# Patient Record
Sex: FEMALE | Race: WHITE | NOT HISPANIC OR LATINO | Employment: OTHER | URBAN - METROPOLITAN AREA
[De-identification: names, ages, dates, MRNs, and addresses within clinical notes are randomized per-mention and may not be internally consistent; named-entity substitution may affect disease eponyms.]

---

## 2017-07-20 ENCOUNTER — TELEPHONE (OUTPATIENT)
Dept: FAMILY MEDICINE | Facility: CLINIC | Age: 67
End: 2017-07-20

## 2017-07-20 NOTE — LETTER
[facility]  [address]  [place] [address]  [phone]        August 7, 2017    Andree Trujillo  [address]  [place] [address]              Dear Andree Trujillo    This is to remind you that you are due for a mammogram.     You may call our office at [phone] to schedule an appointment.    Please disregard this notice if you have already had your labs drawn or made an appointment.        Sincerely,        Dona Bell MD

## 2017-07-21 NOTE — TELEPHONE ENCOUNTER
Panel Management Review      Patient has the following on her problem list: None      Composite cancer screening  Chart review shows that this patient is due/due soon for the following Mammogram  Summary:    Patient is due/failing the following:   MAMMOGRAM    Action needed:   Patient needs referral/order: mammogram    Type of outreach:    Phone, left message for patient to call back.     Questions for provider review:    None                                                                                                                                    Rae Palacios MA       Chart routed to Care Team .

## 2017-07-28 NOTE — TELEPHONE ENCOUNTER
LVM for patient to return call to clinic. Patient is due for a mammogram. Orders placed. Will postpone 7 days and send letter if no response from patient.        Albina Cullen CMA

## 2017-10-26 ENCOUNTER — TELEPHONE (OUTPATIENT)
Dept: FAMILY MEDICINE | Facility: CLINIC | Age: 67
End: 2017-10-26

## 2017-10-26 NOTE — TELEPHONE ENCOUNTER
Patient wants to know if Dr Bell will fill out a form for her to renew her handicapped tag due to pain in her feet with arthritis and tendonitis. Ok to leave a message.

## 2017-11-10 ENCOUNTER — TELEPHONE (OUTPATIENT)
Dept: FAMILY MEDICINE | Facility: CLINIC | Age: 67
End: 2017-11-10

## 2017-11-10 ENCOUNTER — OFFICE VISIT (OUTPATIENT)
Dept: FAMILY MEDICINE | Facility: CLINIC | Age: 67
End: 2017-11-10
Payer: COMMERCIAL

## 2017-11-10 VITALS
SYSTOLIC BLOOD PRESSURE: 138 MMHG | BODY MASS INDEX: 28.57 KG/M2 | DIASTOLIC BLOOD PRESSURE: 76 MMHG | WEIGHT: 177.8 LBS | HEART RATE: 74 BPM | TEMPERATURE: 97.1 F | HEIGHT: 66 IN

## 2017-11-10 DIAGNOSIS — G25.81 RESTLESS LEG SYNDROME: ICD-10-CM

## 2017-11-10 DIAGNOSIS — M76.62 ACHILLES TENDONITIS, BILATERAL: ICD-10-CM

## 2017-11-10 DIAGNOSIS — H61.23 BILATERAL IMPACTED CERUMEN: ICD-10-CM

## 2017-11-10 DIAGNOSIS — I10 ESSENTIAL HYPERTENSION WITH GOAL BLOOD PRESSURE LESS THAN 140/90: ICD-10-CM

## 2017-11-10 DIAGNOSIS — M76.61 ACHILLES TENDONITIS, BILATERAL: ICD-10-CM

## 2017-11-10 DIAGNOSIS — E78.5 HYPERLIPIDEMIA LDL GOAL <100: ICD-10-CM

## 2017-11-10 DIAGNOSIS — Z71.89 ADVANCED DIRECTIVES, COUNSELING/DISCUSSION: ICD-10-CM

## 2017-11-10 DIAGNOSIS — Z78.0 POSTMENOPAUSAL ESTROGEN DEFICIENCY: ICD-10-CM

## 2017-11-10 DIAGNOSIS — E55.9 VITAMIN D DEFICIENCY: ICD-10-CM

## 2017-11-10 DIAGNOSIS — Z00.00 MEDICARE ANNUAL WELLNESS VISIT, INITIAL: Primary | ICD-10-CM

## 2017-11-10 DIAGNOSIS — Z12.31 ENCOUNTER FOR SCREENING MAMMOGRAM FOR BREAST CANCER: ICD-10-CM

## 2017-11-10 LAB
ALBUMIN SERPL-MCNC: 3.9 G/DL (ref 3.4–5)
ALP SERPL-CCNC: 107 U/L (ref 40–150)
ALT SERPL W P-5'-P-CCNC: 30 U/L (ref 0–50)
ANION GAP SERPL CALCULATED.3IONS-SCNC: 7 MMOL/L (ref 3–14)
AST SERPL W P-5'-P-CCNC: 15 U/L (ref 0–45)
BILIRUB SERPL-MCNC: 0.4 MG/DL (ref 0.2–1.3)
BUN SERPL-MCNC: 15 MG/DL (ref 7–30)
CALCIUM SERPL-MCNC: 8.9 MG/DL (ref 8.5–10.1)
CHLORIDE SERPL-SCNC: 104 MMOL/L (ref 94–109)
CHOLEST SERPL-MCNC: 162 MG/DL
CO2 SERPL-SCNC: 29 MMOL/L (ref 20–32)
CREAT SERPL-MCNC: 0.73 MG/DL (ref 0.52–1.04)
DEPRECATED CALCIDIOL+CALCIFEROL SERPL-MC: 28 UG/L (ref 20–75)
GFR SERPL CREATININE-BSD FRML MDRD: 80 ML/MIN/1.7M2
GLUCOSE SERPL-MCNC: 104 MG/DL (ref 70–99)
HDLC SERPL-MCNC: 78 MG/DL
LDLC SERPL CALC-MCNC: 70 MG/DL
NONHDLC SERPL-MCNC: 84 MG/DL
POTASSIUM SERPL-SCNC: 3.8 MMOL/L (ref 3.4–5.3)
PROT SERPL-MCNC: 7.3 G/DL (ref 6.8–8.8)
SODIUM SERPL-SCNC: 140 MMOL/L (ref 133–144)
TRIGL SERPL-MCNC: 69 MG/DL

## 2017-11-10 PROCEDURE — 69210 REMOVE IMPACTED EAR WAX UNI: CPT | Mod: 50 | Performed by: FAMILY MEDICINE

## 2017-11-10 PROCEDURE — 36415 COLL VENOUS BLD VENIPUNCTURE: CPT | Performed by: FAMILY MEDICINE

## 2017-11-10 PROCEDURE — 82306 VITAMIN D 25 HYDROXY: CPT | Performed by: FAMILY MEDICINE

## 2017-11-10 PROCEDURE — 90670 PCV13 VACCINE IM: CPT | Performed by: FAMILY MEDICINE

## 2017-11-10 PROCEDURE — 99397 PER PM REEVAL EST PAT 65+ YR: CPT | Mod: 25 | Performed by: FAMILY MEDICINE

## 2017-11-10 PROCEDURE — 80061 LIPID PANEL: CPT | Performed by: FAMILY MEDICINE

## 2017-11-10 PROCEDURE — 90471 IMMUNIZATION ADMIN: CPT | Performed by: FAMILY MEDICINE

## 2017-11-10 PROCEDURE — 99212 OFFICE O/P EST SF 10 MIN: CPT | Mod: 25 | Performed by: FAMILY MEDICINE

## 2017-11-10 PROCEDURE — 80053 COMPREHEN METABOLIC PANEL: CPT | Performed by: FAMILY MEDICINE

## 2017-11-10 RX ORDER — HYDROCHLOROTHIAZIDE 12.5 MG/1
12.5 CAPSULE ORAL EVERY MORNING
Qty: 90 CAPSULE | Refills: 3 | Status: SHIPPED | OUTPATIENT
Start: 2017-11-10 | End: 2018-10-05

## 2017-11-10 RX ORDER — PRAMIPEXOLE DIHYDROCHLORIDE 0.12 MG/1
TABLET ORAL
Qty: 90 TABLET | Refills: 3 | Status: CANCELLED | OUTPATIENT
Start: 2017-11-10

## 2017-11-10 RX ORDER — ATORVASTATIN CALCIUM 10 MG/1
TABLET, FILM COATED ORAL
Qty: 90 TABLET | Refills: 2 | Status: CANCELLED | OUTPATIENT
Start: 2017-11-10

## 2017-11-10 RX ORDER — LABETALOL 100 MG/1
TABLET, FILM COATED ORAL
Qty: 360 TABLET | Refills: 3 | Status: CANCELLED | OUTPATIENT
Start: 2017-11-10

## 2017-11-10 RX ORDER — LABETALOL 100 MG/1
200 TABLET, FILM COATED ORAL 2 TIMES DAILY
Qty: 360 TABLET | Refills: 3 | Status: SHIPPED | OUTPATIENT
Start: 2017-11-10 | End: 2018-10-05

## 2017-11-10 RX ORDER — PRAMIPEXOLE DIHYDROCHLORIDE 0.12 MG/1
0.12 TABLET ORAL AT BEDTIME
Qty: 90 TABLET | Refills: 3 | Status: SHIPPED | OUTPATIENT
Start: 2017-11-10 | End: 2018-10-05

## 2017-11-10 RX ORDER — HYDROCHLOROTHIAZIDE 12.5 MG/1
CAPSULE ORAL
Qty: 90 CAPSULE | Refills: 3 | Status: CANCELLED | OUTPATIENT
Start: 2017-11-10

## 2017-11-10 RX ORDER — ATORVASTATIN CALCIUM 10 MG/1
10 TABLET, FILM COATED ORAL DAILY
Qty: 90 TABLET | Refills: 3 | Status: SHIPPED | OUTPATIENT
Start: 2017-11-10 | End: 2018-10-05

## 2017-11-10 NOTE — MR AVS SNAPSHOT
After Visit Summary   11/10/2017    Andree Trujillo    MRN: [id number]           Patient Information     Date Of Birth          1950        Visit Information        Provider Department      11/10/2017 9:00 AM Dona Bell MD [facility]        Today's Diagnoses     Hyperlipidemia LDL goal <100    -  1    Restless leg syndrome        Essential hypertension with goal blood pressure less than 140/90        Encounter for screening mammogram for breast cancer        Postmenopausal estrogen deficiency        Bilateral impacted cerumen        Advanced directives, counseling/discussion          Care Instructions      Preventive Health Recommendations    Female Ages 65 +    Yearly exam:     See your health care provider every year in order to  o Review health changes.   o Discuss preventive care.    o Review your medicines if your doctor has prescribed any.      You no longer need a yearly Pap test unless you've had an abnormal Pap test in the past 10 years. If you have vaginal symptoms, such as bleeding or discharge, be sure to talk with your provider about a Pap test.      Every 1 to 2 years, have a mammogram.  If you are over 69, talk with your health care provider about whether or not you want to continue having screening mammograms.      Every 10 years, have a colonoscopy. Or, have a yearly FIT test (stool test). These exams will check for colon cancer.       Have a cholesterol test every 5 years, or more often if your doctor advises it.       Have a diabetes test (fasting glucose) every three years. If you are at risk for diabetes, you should have this test more often.       At age 65, have a bone density scan (DEXA) to check for osteoporosis (brittle bone disease).    Shots:    Get a flu shot each year.    Get a tetanus shot every 10 years.    Talk to your doctor about your pneumonia vaccines. There are now two you should receive - Pneumovax (PPSV 23) and Prevnar (PCV 13).    Talk to  your doctor about the shingles vaccine.    Talk to your doctor about the hepatitis B vaccine.    Nutrition:     Eat at least 5 servings of fruits and vegetables each day.      Eat whole-grain bread, whole-wheat pasta and brown rice instead of white grains and rice.      Talk to your provider about Calcium and Vitamin D.     Lifestyle    Exercise at least 150 minutes a week (30 minutes a day, 5 days a week). This will help you control your weight and prevent disease.      Limit alcohol to one drink per day.      No smoking.       Wear sunscreen to prevent skin cancer.       See your dentist twice a year for an exam and cleaning.      See your eye doctor every 1 to 2 years to screen for conditions such as glaucoma, macular degeneration and cataracts.          Follow-ups after your visit        Additional Services     MARLEY WOMACK REFERRAL       Your provider has referred you to [facility] Advance Care Planning Facilitator or Serious illness clinic support staff. The facilitator or support staff will contact you to schedule the appointment or for the follow up call    Reason for Referral: Basic Advance Care Planning - 1:1 need                  Follow-up notes from your care team     Return in about 1 year (around 11/10/2018) for Physical Exam and as needed throughout the year.      Future tests that were ordered for you today     Open Future Orders        Priority Expected Expires Ordered    *MA Screening Digital Bilateral Routine  11/10/2018 11/10/2017    DX Hip/Pelvis/Spine Routine  11/10/2018 11/10/2017            Who to contact     Normal or non-critical lab and imaging results will be communicated to you by MyChart, letter or phone within 4 business days after the clinic has received the results. If you do not hear from us within 7 days, please contact the clinic through MyChart or phone. If you have a critical or abnormal lab result, we will notify you by phone as soon as possible.  Submit  "refill requests through [employer] or call your pharmacy and they will forward the refill request to us. Please allow 3 business days for your refill to be completed.          If you need to speak with a  for additional information , please call: [phone]             Additional Information About Your Visit        [employer] Information     [employer] lets you send messages to your doctor, view your test results, renew your prescriptions, schedule appointments and more. To sign up, go to [email or web address] . Click on \"Log in\" on the left side of the screen, which will take you to the Welcome page. Then click on \"Sign up Now\" on the right side of the page.     You will be asked to enter the access code listed below, as well as some personal information. Please follow the directions to create your username and password.     Your access code is: [email or web address]  Expires: 2018  9:42 AM     Your access code will  in 90 days. If you need help or a new code, please call your [facility] clinic or [phone].        Care EveryWhere ID     This is your Care EveryWhere ID. This could be used by other organizations to access your [place] medical records  [id number]        Your Vitals Were     Pulse Temperature Height Breastfeeding? BMI (Body Mass Index)       74 97.1  F (36.2  C) (Tympanic) 5' 5.5\" (1.664 m) No 29.14 kg/m2        Blood Pressure from Last 3 Encounters:   11/10/17 138/76   16 106/70   12/11/15 124/72    Weight from Last 3 Encounters:   11/10/17 177 lb 12.8 oz (80.6 kg)   16 166 lb (75.3 kg)   12/11/15 167 lb 3.2 oz (75.8 kg)              We Performed the Following     ADMIN 1st VACCINE     Comprehensive metabolic panel (BMP + Alb, Alk Phos, ALT, AST, Total. Bili, TP)     HONORING CHOICES REFERRAL     Lipid Profile (Chol, Trig, HDL, LDL calc)     PNEUMOCOCCAL CONJ VACCINE 13 VALENT IM          Where to get your medicines      These medications were sent to [facility] [id number] [facility] " - [place], [place] - [address]  [address], [place] [address]     Phone:  [phone]     atorvastatin 10 MG tablet    hydrochlorothiazide 12.5 MG capsule    labetalol 100 MG tablet    pramipexole 0.125 MG tablet          Primary Care Provider Office Phone # Fax #    [facility] [phone]       [address]  [place] [address]        Equal Access to Services     JODY BRIGGS AH: Hadii aad ku hadasho Soomaali, waaxda luqadaha, qaybta kaalmada adeegyada, waxay idiin hayaan adeeg kharash la'aan . So [facility] [phone].    ATENCIÓN: Si habla español, tiene a jaeger disposición servicios gratuitos de asistencia lingüística. [facility] [phone].    We comply with applicable federal civil rights laws and [place] laws. We do not discriminate on the basis of race, color, national origin, age, disability, sex, sexual orientation, or gender identity.            Thank you!     Thank you for choosing [facility]  for your care. Our goal is always to provide you with excellent care. Hearing back from our patients is one way we can continue to improve our services. Please take a few minutes to complete the written survey that you may receive in the mail after your visit with us. Thank you!             Your Updated Medication List - Protect others around you: Learn how to safely use, store and throw away your medicines at www.disposemymeds.org.          This list is accurate as of: 11/10/17  9:42 AM.  Always use your most recent med list.                   Brand Name Dispense Instructions for use Diagnosis    ALEVE PO      Take by mouth 2 times daily (with meals)        aspirin 81 MG tablet      1 TABLET DAILY        atorvastatin 10 MG tablet    LIPITOR    90 tablet    Take 1 tablet (10 mg) by mouth daily    Hyperlipidemia LDL goal <100       DAILY MULTIVITAMIN PO      Take  by mouth.        hydrochlorothiazide 12.5 MG capsule    MICROZIDE    90 capsule    Take 1 capsule (12.5  mg) by mouth every morning    Essential hypertension with goal blood pressure less than 140/90       labetalol 100 MG tablet    NORMODYNE    360 tablet    Take 2 tablets (200 mg) by mouth 2 times daily    Hyperlipidemia LDL goal <100       pramipexole 0.125 MG tablet    MIRAPEX    90 tablet    Take 1 tablet (0.125 mg) by mouth At Bedtime    Restless leg syndrome

## 2017-11-10 NOTE — LETTER
November 21, 2017      Andree Trujillo  [address] [address]        Dear ,    We are writing to inform you of your test results.    Complete Metabolic Panel (panel that checks liver function, kidney function and electrolytes) was normal.  Fasting blood glucose was slightly elevated, this means that you have no diabetes but you could be at risk (prediabetes).  Recommend regular exercise at least 5 times a week for 45 minutes in addition to eating a healthy well balanced diet.     Cholesterol panel was normal.     Vitamin D level was on the low side of normal. Recommend that you take a supplemental Vitamin D at least 800-1000 units/day.    Resulted Orders   Lipid Profile (Chol, Trig, HDL, LDL calc)   Result Value Ref Range    Cholesterol 162 <200 mg/dL    Triglycerides 69 <150 mg/dL      Comment:      Fasting specimen    HDL Cholesterol 78 >49 mg/dL    LDL Cholesterol Calculated 70 <100 mg/dL      Comment:      Desirable:       <100 mg/dl    Non HDL Cholesterol 84 <130 mg/dL   Comprehensive metabolic panel (BMP + Alb, Alk Phos, ALT, AST, Total. Bili, TP)   Result Value Ref Range    Sodium 140 133 - 144 mmol/L    Potassium 3.8 3.4 - 5.3 mmol/L    Chloride 104 94 - 109 mmol/L    Carbon Dioxide 29 20 - 32 mmol/L    Anion Gap 7 3 - 14 mmol/L    Glucose 104 (H) 70 - 99 mg/dL      Comment:      Fasting specimen    Urea Nitrogen 15 7 - 30 mg/dL    Creatinine 0.73 0.52 - 1.04 mg/dL    GFR Estimate 80 >60 mL/min/1.7m2      Comment:      Non  GFR Calc    GFR Estimate If Black >90 >60 mL/min/1.7m2      Comment:       GFR Calc    Calcium 8.9 8.5 - 10.1 mg/dL    Bilirubin Total 0.4 0.2 - 1.3 mg/dL    Albumin 3.9 3.4 - 5.0 g/dL    Protein Total 7.3 6.8 - 8.8 g/dL    Alkaline Phosphatase 107 40 - 150 U/L    ALT 30 0 - 50 U/L    AST 15 0 - 45 U/L   Vitamin D Deficiency   Result Value Ref Range    Vitamin D Deficiency screening 28 20 - 75 ug/L      Comment:      Season, race,  dietary intake, and treatment affect the concentration of   25-hydroxy-Vitamin D. Values may decrease during winter months and increase   during summer months. Values 20-29 ug/L may indicate Vitamin D insufficiency   and values <20 ug/L may indicate Vitamin D deficiency.  Vitamin D determination is routinely performed by an immunoassay specific for   25 hydroxyvitamin D3.  If an individual is on vitamin D2 (ergocalciferol)   supplementation, please specify 25 OH vitamin D2 and D3 level determination by   LCMSMS test [id number].         If you have any questions or concerns, please call the clinic at the number listed above.       Sincerely,        Dona Bell MD/mp

## 2017-11-10 NOTE — PROGRESS NOTES
SUBJECTIVE:   Andree Trujillo is a 67 year old female who presents for Preventive Visit.      Are you in the first 12 months of your Medicare Part B coverage?  No    Healthy Habits:    Do you get at least three servings of calcium containing foods daily (dairy, green leafy vegetables, etc.)? no    Amount of exercise or daily activities, outside of work: none    Problems taking medications regularly No    Medication side effects: No    Have you had an eye exam in the past two years? yes    Do you see a dentist twice per year? yes    Do you have sleep apnea, excessive snoring or daytime drowsiness?yes    COGNITIVE SCREEN  1) Repeat 3 items (Banana, Sunrise, Chair)    2) Clock draw: NORMAL  3) 3 item recall: Recalls 3 objects  Results: 3 items recalled: COGNITIVE IMPAIRMENT LESS LIKELY    Mini-CogTM Copyright S Pavan. Licensed by the author for use in [facility]; reprinted with permission ([email or web address]). All rights reserved.          Additional Concerns:    - Would like to discuss renewing handicap parking, expiring this month. Due to a history of achillis tendinitis of both lower extremities, seen by Podiatry in the past.   Also reports occasional back pain, no previous work-up  - Wax build up of both ears, would like ears checked.       Patient informed that anything we discuss that is not related to preventative medicine, may be billed for; patient verbalizes understanding.        Reviewed and updated as needed this visit by clinical staff  Tobacco  Allergies  Meds  Problems         Reviewed and updated as needed this visit by Provider        Social History   Substance Use Topics     Smoking status: Former Smoker     Quit date: 1/17/1991     Smokeless tobacco: Never Used     Alcohol use Yes      Comment: occ       The patient does not drink >3 drinks per day nor >7 drinks per week.    Today's PHQ-2 Score:   PHQ-2 ( 1999 [employer]) 11/10/2017 12/11/2015   Q1: Little interest or pleasure in doing things  0 0   Q2: Feeling down, depressed or hopeless 0 0   PHQ-2 Score 0 0         Do you feel safe in your environment - Yes    Do you have a Health Care Directive?: No: Advance care planning reviewed with patient; information given to patient to review.      Current providers sharing in care for this patient include: Patient Care Team:  Estrella Pineda as PCP - General      Hearing impairment: no    Ability to successfully perform activities of daily living: Yes, no assistance needed     Fall risk:  Fallen 2 or more times in the past year?: No  Any fall with injury in the past year?: No      Home safety:  throw rugs in the hallway      The following health maintenance items are reviewed in Epic and correct as of today:  Health Maintenance   Topic Date Due     MAMMO SCREEN Q2 YR (SYSTEM ASSIGNED)  01/26/2013     ADVANCE DIRECTIVE PLANNING Q5 YRS  04/06/2017     INFLUENZA VACCINE (SYSTEM ASSIGNED)  09/01/2017     BMP Q1 YR  09/22/2017     FALL RISK ASSESSMENT  10/08/2017     DEXA SCAN SCREENING (SYSTEM ASSIGNED)  10/08/2017     PNEUMOCOCCAL (1 of 2 - PCV13) 10/08/2017     COLON CANCER SCREEN (SYSTEM ASSIGNED)  08/13/2019     TETANUS IMMUNIZATION (SYSTEM ASSIGNED)  01/17/2021     LIPID SCREEN Q5 YR FEMALE (SYSTEM ASSIGNED)  09/22/2021     HEPATITIS C SCREENING  Completed     Patient Active Problem List   Diagnosis     Osteopenia     Allergic state     CARDIOVASCULAR SCREENING; LDL GOAL LESS THAN 100     Advanced directives, counseling/discussion     Hypercholesterolemia     Hypertension goal BP (blood pressure) < 140/90     Achilles tendonitis, bilateral     Bilateral impacted cerumen     Vitamin D deficiency     Past Surgical History:   Procedure Laterality Date     GYN SURGERY  2009    hysterectomy     HYSTERECTOMY, PAP NO LONGER INDICATED       NO HISTORY OF SURGERY         Social History   Substance Use Topics     Smoking status: Former Smoker     Quit date: 1/17/1991     Smokeless tobacco: Never Used      Alcohol use Yes      Comment: occ     Family History   Problem Relation Age of Onset     Breast Cancer Mother      Arthritis Mother      Hypertension Father      Alcohol/Drug Father      Arthritis Father      CEREBROVASCULAR DISEASE Maternal Grandmother      CANCER Maternal Grandmother      CANCER Maternal Grandfather      Hypertension Paternal Grandfather      Hypertension Brother      Prostate Cancer Brother      Alcohol/Drug Brother      Allergies Brother      DIABETES Paternal Aunt      CANCER Sister      cervical ca     Coronary Artery Disease No family hx of          Current Outpatient Prescriptions   Medication Sig Dispense Refill     labetalol (NORMODYNE) 100 MG tablet Take 2 tablets (200 mg) by mouth 2 times daily 360 tablet 3     atorvastatin (LIPITOR) 10 MG tablet Take 1 tablet (10 mg) by mouth daily 90 tablet 3     pramipexole (MIRAPEX) 0.125 MG tablet Take 1 tablet (0.125 mg) by mouth At Bedtime 90 tablet 3     hydrochlorothiazide (MICROZIDE) 12.5 MG capsule Take 1 capsule (12.5 mg) by mouth every morning 90 capsule 3     [DISCONTINUED] labetalol (NORMODYNE) 100 MG tablet Take 2 tablets (200 mg) by mouth 2 times daily 360 tablet 3     [DISCONTINUED] hydrochlorothiazide (MICROZIDE) 12.5 MG capsule Take 1 capsule (12.5 mg) by mouth every morning 90 capsule 3     [DISCONTINUED] pramipexole (MIRAPEX) 0.125 MG tablet Take 1 tablet (0.125 mg) by mouth At Bedtime 90 tablet 3     [DISCONTINUED] atorvastatin (LIPITOR) 10 MG tablet Take 1 tablet (10 mg) by mouth daily 90 tablet 3     Naproxen Sodium (ALEVE PO) Take by mouth 2 times daily (with meals)       Multiple Vitamin (DAILY MULTIVITAMIN PO) Take  by mouth.       ASPIRIN 81 MG OR TABS 1 TABLET DAILY       Allergies   Allergen Reactions     Erythromycin Rash     Penicillins Rash         Pneumonia Vaccine:Adults age 65+ who received Pneumovax (PPSV23) at 65 years or older: Should be given PCV13 > 1 year after their most recent PPSV23    ROS:  Constitutional,  "HEENT, cardiovascular, pulmonary, GI, , musculoskeletal, neuro, skin, endocrine and psych systems are negative, except as otherwise noted.      OBJECTIVE:   /76  Pulse 74  Temp 97.1  F (36.2  C) (Tympanic)  Ht 5' 5.5\" (1.664 m)  Wt 177 lb 12.8 oz (80.6 kg)  Breastfeeding? No  BMI 29.14 kg/m2 Estimated body mass index is 29.14 kg/(m^2) as calculated from the following:    Height as of this encounter: 5' 5.5\" (1.664 m).    Weight as of this encounter: 177 lb 12.8 oz (80.6 kg).  EXAM:   GENERAL: healthy, alert and no distress  EYES: Eyes grossly normal to inspection, PERRL and conjunctivae and sclerae normal  HENT: Using an otoscope noted bilateral cerumen impaction in both ear canals; using a lighted ear curette, the cerumen was removed completely and easily to reveal normal ear canals and TM's normal, nose and mouth without ulcers or lesions  NECK: no adenopathy, no asymmetry, masses, or scars and thyroid normal to palpation  RESP: lungs clear to auscultation - no rales, rhonchi or wheezes  BREAST: normal without masses, tenderness or nipple discharge and no palpable axillary masses or adenopathy  CV: regular rate and rhythm, normal S1 S2, no S3 or S4, no murmur, click or rub, no peripheral edema and peripheral pulses strong  ABDOMEN: soft, nontender, no hepatosplenomegaly, no masses and bowel sounds normal  MS: no gross musculoskeletal defects noted, no edema  SKIN: no suspicious lesions or rashes  NEURO: Normal strength and tone, mentation intact and speech normal  PSYCH: mentation appears normal, affect normal/bright    DATA  Labs in progress  ASSESSMENT / PLAN:   Andree was seen today for annual visit.    Diagnoses and all orders for this visit:    Medicare annual wellness visit, initial  -     PNEUMOCOCCAL CONJ VACCINE 13 VALENT IM  -     ADMIN 1st VACCINE    Hyperlipidemia LDL goal <100, controlled on Lipitor  -    Refill:  atorvastatin (LIPITOR) 10 MG tablet; Take 1 tablet (10 mg) by mouth " "daily  -     Lipid Profile (Chol, Trig, HDL, LDL calc)  -     Comprehensive metabolic panel (BMP + Alb, Alk Phos, ALT, AST, Total. Bili, TP)    Restless leg syndrome, controlled on medications  -     Refill: pramipexole (MIRAPEX) 0.125 MG tablet; Take 1 tablet (0.125 mg) by mouth At Bedtime    Essential hypertension with goal blood pressure less than 140/90, controlled  -    Refill:  labetalol (NORMODYNE) 100 MG tablet; Take 2 tablets (200 mg) by mouth 2 times daily  -    Refill: hydrochlorothiazide (MICROZIDE) 12.5 MG capsule; Take 1 capsule (12.5 mg) by mouth every morning  -     Comprehensive metabolic panel (BMP + Alb, Alk Phos, ALT, AST, Total. Bili, TP)    Encounter for screening mammogram for breast cancer  -     *MA Screening Digital Bilateral; Future    Postmenopausal estrogen deficiency  -     DX Hip/Pelvis/Spine; Future    Bilateral impacted cerumen  -     REMOVE IMPACTED CERUMEN    History of Achilles tendonitis, bilateral  Requesting re-cert for disability parking permit x 1 year.  Patient to complete her portion and will bring in for provider section completion    Vitamin D deficiency  -     Vitamin D Deficiency    Advanced directives, counseling/discussion  -     HONORING CHOICES REFERRAL          End of Life Planning:  Patient currently has an advanced directive: No.  I have verified the patient's ablity to prepare an advanced directive/make health care decisions.  Literature was provided to assist patient in preparing an advanced directive.    COUNSELING:  Reviewed preventive health counseling, as reflected in patient instructions       Regular exercise       Healthy diet/nutrition        Estimated body mass index is 29.14 kg/(m^2) as calculated from the following:    Height as of this encounter: 5' 5.5\" (1.664 m).    Weight as of this encounter: 177 lb 12.8 oz (80.6 kg).  Weight management plan: Discussed healthy diet and exercise guidelines and patient will follow up in 12 months in clinic to " re-evaluate.   reports that she quit smoking about 26 years ago. She has never used smokeless tobacco.        Appropriate preventive services were discussed with this patient, including applicable screening as appropriate for cardiovascular disease, diabetes, osteopenia/osteoporosis, and glaucoma.  As appropriate for age/gender, discussed screening for colorectal cancer, prostate cancer, breast cancer, and cervical cancer. Checklist reviewing preventive services available has been given to the patient.    Reviewed patients plan of care and provided an AVS. The Basic Care Plan (routine screening as documented in Health Maintenance) for Andree meets the Care Plan requirement. This Care Plan has been established and reviewed with the Patient.    Counseling Resources:  ATP IV Guidelines  Pooled Cohorts Equation Calculator  Breast Cancer Risk Calculator  FRAX Risk Assessment  ICSI Preventive Guidelines  Dietary Guidelines for Americans, 2010  USDA's MyPlate  ASA Prophylaxis  Lung CA Screening    Follow up annually and as needed thoughout the year.    Dona Bell MD  [facility]

## 2017-12-15 ENCOUNTER — TELEPHONE (OUTPATIENT)
Dept: FAMILY MEDICINE | Facility: CLINIC | Age: 67
End: 2017-12-15

## 2017-12-15 NOTE — LETTER
[facility]  [address]  [place] [address]  [phone]        January 2, 2018    Andree Trujillo  [address]  [place] [address]              Dear Andree Trujillo    This is to remind you that your mammogram is due.    You may call our office at [phone] to have orders placed for you and to get an appointment scheduled.    Please disregard this notice if you have already made an appointment.        Sincerely,        [facility]

## 2017-12-20 NOTE — TELEPHONE ENCOUNTER
Left message for patient to call back pod 2 line.([phone])    If no call back in x1 week send letter.

## 2018-02-16 ENCOUNTER — OFFICE VISIT (OUTPATIENT)
Dept: FAMILY MEDICINE | Facility: CLINIC | Age: 68
End: 2018-02-16
Payer: COMMERCIAL

## 2018-02-16 VITALS
OXYGEN SATURATION: 97 % | BODY MASS INDEX: 28.28 KG/M2 | HEART RATE: 77 BPM | DIASTOLIC BLOOD PRESSURE: 85 MMHG | WEIGHT: 176 LBS | SYSTOLIC BLOOD PRESSURE: 136 MMHG | HEIGHT: 66 IN | TEMPERATURE: 97.7 F

## 2018-02-16 DIAGNOSIS — J06.9 UPPER RESPIRATORY TRACT INFECTION, UNSPECIFIED TYPE: Primary | ICD-10-CM

## 2018-02-16 PROCEDURE — 99213 OFFICE O/P EST LOW 20 MIN: CPT | Performed by: PHYSICIAN ASSISTANT

## 2018-02-16 NOTE — LETTER
[facility]  [address] [facility] [address]  Phone: [phone]    February 16, 2018        Andree Trujillo  [address] [address] [facility] [address]          To whom it may concern:    RE: Andree Trujillo    Patient was seen and treated today at our clinic.    Please contact me for questions or concerns.      Sincerely,        Sin Aguilar PA-C

## 2018-02-16 NOTE — MR AVS SNAPSHOT
"              After Visit Summary   2018    Andree Trujillo    MRN: [id number]           Patient Information     Date Of Birth          1950        Visit Information        Provider Department      2018 12:30 PM Sin Aguilar PA-C [facility]        Today's Diagnoses     Upper respiratory tract infection, unspecified type    -  1       Follow-ups after your visit        Who to contact     If you have questions or need follow up information about today's clinic visit or your schedule please contact [facility] directly at [phone].  Normal or non-critical lab and imaging results will be communicated to you by [employer], letter or phone within 4 business days after the clinic has received the results. If you do not hear from us within 7 days, please contact the clinic through [employer] or phone. If you have a critical or abnormal lab result, we will notify you by phone as soon as possible.  Submit refill requests through [employer] or call your pharmacy and they will forward the refill request to us. Please allow 3 business days for your refill to be completed.          Additional Information About Your Visit        MyChart Information     [employer] lets you send messages to your doctor, view your test results, renew your prescriptions, schedule appointments and more. To sign up, go to [email or web address] . Click on \"Log in\" on the left side of the screen, which will take you to the Welcome page. Then click on \"Sign up Now\" on the right side of the page.     You will be asked to enter the access code listed below, as well as some personal information. Please follow the directions to create your username and password.     Your access code is: [email or web address]  Expires: 2018 12:46 PM     Your access code will  in 90 days. If you need help or a new code, please call your [facility] or [phone].        Care EveryWhere ID     This is your Care EveryWhere ID. " "This could be used by other organizations to access your [place] medical records  [id number]        Your Vitals Were     Pulse Temperature Height Pulse Oximetry BMI (Body Mass Index)       77 97.7  F (36.5  C) (Tympanic) 5' 5.5\" (1.664 m) 97% 28.84 kg/m2        Blood Pressure from Last 3 Encounters:   02/16/18 136/85   11/10/17 138/76   09/22/16 106/70    Weight from Last 3 Encounters:   02/16/18 176 lb (79.8 kg)   11/10/17 177 lb 12.8 oz (80.6 kg)   09/22/16 166 lb (75.3 kg)              Today, you had the following     No orders found for display       Primary Care Provider Office Phone # Fax #    [facility] [phone] [address] [facility] [address]        Equal Access to Services     JODY BRIGGS : Hadii aad ku hadasho Soomaali, waaxda luqadaha, qaybta kaalmada adeegyada, waxay idiin hayaan chelle carmona . So [facility] [phone].    ATENCIÓN: Si habla español, tiene a jaeger disposición servicios gratuitos de asistencia lingüística. Taniya al [phone].    We comply with applicable federal civil rights laws and [place] laws. We do not discriminate on the basis of race, color, national origin, age, disability, sex, sexual orientation, or gender identity.            Thank you!     Thank you for choosing [facility]  [facility]. Our goal is always to provide you with excellent care. Hearing back from our patients is one way we can continue to improve our services. Please take a few minutes to complete the written survey that you may receive in the mail after your visit with us. Thank you!             Your Updated Medication List - Protect others around you: Learn how to safely use, store and throw away your medicines at www.disposemymeds.org.          This list is accurate as of 2/16/18 12:46 PM.  Always use your most recent med list.                   Brand Name Dispense Instructions for use Diagnosis    ALEVE PO      Take by mouth 2 times daily " (with meals)        aspirin 81 MG tablet      1 TABLET DAILY        atorvastatin 10 MG tablet    LIPITOR    90 tablet    Take 1 tablet (10 mg) by mouth daily    Hyperlipidemia LDL goal <100       DAILY MULTIVITAMIN PO      Take  by mouth.        hydrochlorothiazide 12.5 MG capsule    MICROZIDE    90 capsule    Take 1 capsule (12.5 mg) by mouth every morning    Essential hypertension with goal blood pressure less than 140/90       labetalol 100 MG tablet    NORMODYNE    360 tablet    Take 2 tablets (200 mg) by mouth 2 times daily    Essential hypertension with goal blood pressure less than 140/90       pramipexole 0.125 MG tablet    MIRAPEX    90 tablet    Take 1 tablet (0.125 mg) by mouth At Bedtime    Restless leg syndrome

## 2018-02-16 NOTE — PROGRESS NOTES
SUBJECTIVE:                                                    Andree Trujillo is a 67 year old female who presents to clinic today for the following health issues:    RESPIRATORY SYMPTOMS      Duration: 2 weeks    Description  Cough, body aches, chest congestion, fatige, wheezing.     Severity: moderate    Accompanying signs and symptoms: None    History (predisposing factors):  none    Precipitating or alleviating factors: None    Therapies tried and outcome:  Benadryl, cough drops   Overall is slowly feeling better.     Problem list and histories reviewed & adjusted, as indicated.  Additional history: as documented    Patient Active Problem List   Diagnosis     Osteopenia     Allergic state     CARDIOVASCULAR SCREENING; LDL GOAL LESS THAN 100     Advanced directives, counseling/discussion     Hypercholesterolemia     Hypertension goal BP (blood pressure) < 140/90     Achilles tendonitis, bilateral     Bilateral impacted cerumen     Vitamin D deficiency     Past Surgical History:   Procedure Laterality Date     GYN SURGERY  2009    hysterectomy     HYSTERECTOMY, PAP NO LONGER INDICATED       NO HISTORY OF SURGERY         Social History   Substance Use Topics     Smoking status: Former Smoker     Quit date: 1/17/1991     Smokeless tobacco: Never Used     Alcohol use Yes      Comment: occ     Family History   Problem Relation Age of Onset     Breast Cancer Mother      Arthritis Mother      Hypertension Father      Alcohol/Drug Father      Arthritis Father      CEREBROVASCULAR DISEASE Maternal Grandmother      CANCER Maternal Grandmother      CANCER Maternal Grandfather      Hypertension Paternal Grandfather      Hypertension Brother      Prostate Cancer Brother      Alcohol/Drug Brother      Allergies Brother      DIABETES Paternal Aunt      CANCER Sister      cervical ca     Coronary Artery Disease No family hx of          Current Outpatient Prescriptions   Medication Sig Dispense Refill     labetalol (NORMODYNE) 100  "MG tablet Take 2 tablets (200 mg) by mouth 2 times daily 360 tablet 3     atorvastatin (LIPITOR) 10 MG tablet Take 1 tablet (10 mg) by mouth daily 90 tablet 3     pramipexole (MIRAPEX) 0.125 MG tablet Take 1 tablet (0.125 mg) by mouth At Bedtime 90 tablet 3     hydrochlorothiazide (MICROZIDE) 12.5 MG capsule Take 1 capsule (12.5 mg) by mouth every morning 90 capsule 3     Naproxen Sodium (ALEVE PO) Take by mouth 2 times daily (with meals)       Multiple Vitamin (DAILY MULTIVITAMIN PO) Take  by mouth.       ASPIRIN 81 MG OR TABS 1 TABLET DAILY       Allergies   Allergen Reactions     Erythromycin Rash     Penicillins Rash       ROS:  Constitutional, HEENT, cardiovascular, pulmonary, gi and gu systems are negative, except as otherwise noted.    OBJECTIVE:     /85  Pulse 77  Temp 97.7  F (36.5  C) (Tympanic)  Ht 5' 5.5\" (1.664 m)  Wt 176 lb (79.8 kg)  SpO2 97%  BMI 28.84 kg/m2  Body mass index is 28.84 kg/(m^2).  GENERAL: healthy, alert and no distress  Head: Normocephalic, atraumatic.  Eyes: Conjunctiva clear, non icteric. PERRLA.  Ears: External ears and TMs normal BL.  Nose: Septum midline, nasal mucosa pink and moist. No discharge.  Mouth / Throat: Normal dentition.  No oral lesions. Pharynx non erythematous, tonsils without hypertrophy.  Neck: Supple, no enlarged LN, trachea midline.  Heart: S1 and S2 normal, no murmurs, clicks, gallops or rubs. Regular rate and rhythm.  Chest: Clear; no wheezes or rales.    Diagnostic Test Results:  none     ASSESSMENT/PLAN:         ICD-10-CM    1. Upper respiratory tract infection, unspecified type J06.9    Warning signs discussed.  side effects discussed  Symptomatic treatment: such as fluids,  OTC acetaminophen and /or non-steroidal anti-inflammatory medication.  Follow up  1-2 wks as needed     Sin Aguilar PA-C  [facility]    "

## 2018-02-16 NOTE — NURSING NOTE
"Chief Complaint   Patient presents with     Cough       Initial /85  Pulse 77  Temp 97.7  F (36.5  C) (Tympanic)  Ht 5' 5.5\" (1.664 m)  Wt 176 lb (79.8 kg)  SpO2 97%  BMI 28.84 kg/m2 Estimated body mass index is 28.84 kg/(m^2) as calculated from the following:    Height as of this encounter: 5' 5.5\" (1.664 m).    Weight as of this encounter: 176 lb (79.8 kg).  Medication Reconciliation: complete  Juanita Buitrago CMA    "

## 2018-06-12 ENCOUNTER — TELEPHONE (OUTPATIENT)
Dept: FAMILY MEDICINE | Facility: CLINIC | Age: 68
End: 2018-06-12

## 2018-06-12 DIAGNOSIS — Z12.31 ENCOUNTER FOR SCREENING MAMMOGRAM FOR BREAST CANCER: Primary | ICD-10-CM

## 2018-06-12 NOTE — TELEPHONE ENCOUNTER
Panel Management Review      Patient has the following on her problem list: None      Composite cancer screening  Chart review shows that this patient is due/due soon for the following Mammogram  Summary:    Patient is due/failing the following:   MAMMOGRAM    Action needed:   Patient needs office visit for mammogram.    Type of outreach:    Phone, left message for patient to call back.     Questions for provider review:    None                                                                                                                                    Rae Palacios MA       Chart routed to Care Team .

## 2018-06-19 NOTE — TELEPHONE ENCOUNTER
Left message for patient to call back pod 2 line.([phone])  If no call back in x1 week; send letter.

## 2018-06-29 ENCOUNTER — RADIANT APPOINTMENT (OUTPATIENT)
Dept: MAMMOGRAPHY | Facility: CLINIC | Age: 68
End: 2018-06-29
Attending: FAMILY MEDICINE
Payer: COMMERCIAL

## 2018-06-29 DIAGNOSIS — Z12.31 ENCOUNTER FOR SCREENING MAMMOGRAM FOR BREAST CANCER: ICD-10-CM

## 2018-06-29 PROCEDURE — 77067 SCR MAMMO BI INCL CAD: CPT | Mod: TC

## 2018-10-04 NOTE — PATIENT INSTRUCTIONS

## 2018-10-04 NOTE — PROGRESS NOTES
"   SUBJECTIVE:   CC: Andree Trujillo is an 67 year old woman who presents for preventive health visit.     Healthy Habits:    Do you get at least three servings of calcium containing foods daily (dairy, green leafy vegetables, etc.)? {YES/NO, DAIRY INTAKE:520709::\"yes\"}    Amount of exercise or daily activities, outside of work: {AMOUNT EXERCISE:589436}    Problems taking medications regularly {Yes /No default:447692::\"No\"}    Medication side effects: {Yes /No default.:903827::\"No\"}    Have you had an eye exam in the past two years? {YESNOBLANK:054556}    Do you see a dentist twice per year? {YESNOBLANK:578200}    Do you have sleep apnea, excessive snoring or daytime drowsiness?{YESNOBLANK:484300}  {Outside tests to abstract? :576939}    {additional problems to add (Optional):091502}    Today's PHQ-2 Score:   PHQ-2 ( 1999 [employer]) 11/10/2017 12/11/2015   Q1: Little interest or pleasure in doing things 0 0   Q2: Feeling down, depressed or hopeless 0 0   PHQ-2 Score 0 0     {PHQ-2 LOOK IN ASSESSMENTS (Optional) :799545}  Abuse: Current or Past(Physical, Sexual or Emotional)- {YES/NO/NA:959289}  Do you feel safe in your environment - {YES/NO/NA:703943}    Social History   Substance Use Topics     Smoking status: Former Smoker     Quit date: 1/17/1991     Smokeless tobacco: Never Used     Alcohol use Yes      Comment: occ     If you drink alcohol do you typically have >3 drinks per day or >7 drinks per week? {ETOH :212049}                     Reviewed orders with patient.  Reviewed health maintenance and updated orders accordingly - {Yes/No:802773::\"Yes\"}  {Chronicprobdata (Optional):062689}    {Mammo Decision Support (Optional):424360}    Pertinent mammograms are reviewed under the imaging tab.  History of abnormal Pap smear: {PAP HX:591414}  PAP / HPV 1/20/2011 4/13/2009   PAP NIL NIL     Reviewed and updated as needed this visit by clinical staff         Reviewed and updated as needed this visit by Provider      " "  {HISTORY OPTIONS (Optional):513000}    ROS:  { :172933}    OBJECTIVE:   There were no vitals taken for this visit.  EXAM:  {Exam Choices:935318}    {Diagnostic Test Results (Optional):421464::\"Diagnostic Test Results:\",\"none \"}    ASSESSMENT/PLAN:   {Diag Picklist:832810}    COUNSELING:   {FEMALE COUNSELING MESSAGES:956472::\"Reviewed preventive health counseling, as reflected in patient instructions\"}    BP Readings from Last 1 Encounters:   02/16/18 136/85     Estimated body mass index is 28.84 kg/(m^2) as calculated from the following:    Height as of 2/16/18: 5' 5.5\" (1.664 m).    Weight as of 2/16/18: 176 lb (79.8 kg).    {BP Counseling- Complete if BP >= 120/80  (Optional):120074}  {Weight Management Plan (ACO) Complete if BMI is abnormal-  Ages 18-64  BMI >24.9.  Age 65+ with BMI <23 or >30 (Optional):214969}     reports that she quit smoking about 27 years ago. She has never used smokeless tobacco.  {Tobacco Cessation -- Complete if patient is a smoker (Optional):807128}    Counseling Resources:  ATP IV Guidelines  Pooled Cohorts Equation Calculator  Breast Cancer Risk Calculator  FRAX Risk Assessment  ICSI Preventive Guidelines  Dietary Guidelines for Americans, 2010  [employer] MyPlate  ASA Prophylaxis  Lung CA Screening    Dona Bell MD  [facility]  "

## 2018-10-04 NOTE — PROGRESS NOTES
SUBJECTIVE:   Andree Trujillo is a 67 year old female who presents for Preventive Visit.  Are you in the first 12 months of your Medicare Part B coverage?  No    Healthy Habits:    Do you get at least three servings of calcium containing foods daily (dairy, green leafy vegetables, etc.)? no    Amount of exercise or daily activities, outside of work: none    Problems taking medications regularly No    Medication side effects: No    Have you had an eye exam in the past two years? yes    Do you see a dentist twice per year? yes  Do you have sleep apnea, excessive snoring or daytime drowsiness? Daytime drowsiness       Ability to successfully perform activities of daily living: Yes, no assistance needed    Home safety:  throw rugs in the hallway     Hearing impairment: No    Fall risk:  Fallen 2 or more times in the past year?: No  Any fall with injury in the past year?: No      COGNITIVE SCREEN  1) Repeat 3 items (Leader, Season, Table)    2) Clock draw: NORMAL  3) 3 item recall: Recalls 3 objects  Results: 3 items recalled: COGNITIVE IMPAIRMENT LESS LIKELY    Mini-CogTM Copyright S Pavan. Licensed by the author for use in [facility]; reprinted with permission (lauren@[facility]). All rights reserved.          Lower Back Pain- Chronic   A lot of discomfort with sitting for long periods of time.   Pain radiation through left and right buttocks.   Has not been seen for this previously.  Reporting hx of bursitis in lower back/ upper buttocks.  Taking OTC analgesics for it.   Denies having any     Medication Request  Would like to discuss increase number of tab dispensed at a time for Mirapex; prefers a # 90 supply.    HYPERLIPIDEMIA - Patient has a long history of significant Hyperlipidemia requiring medication for treatment with recent good control. Patient reports no problems or side effects with the medication.                                                                                                                                                        .  HYPERTENSION - Patient has longstanding history of HTN , currently denies any symptoms referable to elevated blood pressure. Specifically denies chest pain, palpitations, dyspnea, orthopnea, PND or peripheral edema. Blood pressure readings have been in normal range. Current medication regimen is as listed below. Patient denies any side effects of medication.                                                                                                                                                                                          .  History of Restless Leg syndrome, symptoms are controlled on Mirapex, requesting a refill. # 90 day supply.     Requesting her ears to be checked, has a history of cerumen impaction.    Requesting a Disability Application Renewal     HEALTH CARE MAINTENANCE: Due for a Dexa screening    Patient informed that anything we discuss that is not related to preventative medicine, may be billed for; patient verbalizes understanding.        Reviewed and updated as needed this visit by clinical staff  Tobacco  Allergies  Meds         Reviewed and updated as needed this visit by Provider        Social History   Substance Use Topics     Smoking status: Former Smoker     Quit date: 1/17/1991     Smokeless tobacco: Never Used     Alcohol use Yes      Comment: occ       If you drink alcohol do you typically have >3 drinks per day or >7 drinks per week? No                        Today's PHQ-2 Score:   PHQ-2 ( 1999 Pf[employer]) 10/4/2018 11/10/2017   Q1: Little interest or pleasure in doing things 0 0   Q2: Feeling down, depressed or hopeless 0 0   PHQ-2 Score 0 0       Do you feel safe in your environment - Yes    Do you have a Health Care Directive?: No: Advance care planning reviewed with patient; information given to patient to review.    Current providers sharing in care for this patient include:   Patient Care Team:  Estrella Pineda  as PCP - General    The following health maintenance items are reviewed in Epic and correct as of today:  Health Maintenance   Topic Date Due     HIV SCREEN (SYSTEM ASSIGNED)  10/08/1970     DEXA SCAN SCREENING (SYSTEM ASSIGNED)  10/08/2017     INFLUENZA VACCINE (1) 09/01/2018     PNEUMOCOCCAL (2 of 2 - PPSV23) 11/10/2018     COLON CANCER SCREEN (SYSTEM ASSIGNED)  08/13/2019     BMP Q1 YR  10/05/2019     FALL RISK ASSESSMENT  10/05/2019     PHQ-2 Q1 YR  10/05/2019     MAMMO SCREEN Q2 YR (SYSTEM ASSIGNED)  06/29/2020     TETANUS IMMUNIZATION (SYSTEM ASSIGNED)  01/17/2021     ADVANCE DIRECTIVE PLANNING Q5 YRS  11/10/2022     LIPID SCREEN Q5 YR FEMALE (SYSTEM ASSIGNED)  10/05/2023     HEPATITIS C SCREENING  Completed     BP Readings from Last 3 Encounters:   10/05/18 129/79   02/16/18 136/85   11/10/17 138/76    Wt Readings from Last 3 Encounters:   10/05/18 179 lb 12.8 oz (81.6 kg)   02/16/18 176 lb (79.8 kg)   11/10/17 177 lb 12.8 oz (80.6 kg)                  Patient Active Problem List   Diagnosis     Osteopenia     Allergic state     CARDIOVASCULAR SCREENING; LDL GOAL LESS THAN 100     Advanced directives, counseling/discussion     Hypercholesterolemia     Hypertension goal BP (blood pressure) < 140/90     Achilles tendonitis, bilateral     Bilateral impacted cerumen     Vitamin D deficiency     Past Surgical History:   Procedure Laterality Date     HYSTERECTOMY, PAP NO LONGER INDICATED  2009    Total hysterectomy with bilateral oophorectomy due to Pelvic pain       Social History   Substance Use Topics     Smoking status: Former Smoker     Quit date: 1/17/1991     Smokeless tobacco: Never Used     Alcohol use Yes      Comment: occ     Family History   Problem Relation Age of Onset     Breast Cancer Mother      Arthritis Mother      Hypertension Father      Alcohol/Drug Father      Arthritis Father      Cerebrovascular Disease Maternal Grandmother      Cancer Maternal Grandmother      Cancer Maternal Grandfather   "    Hypertension Paternal Grandfather      Hypertension Brother      Prostate Cancer Brother      Alcohol/Drug Brother      Allergies Brother      Diabetes Paternal Aunt      Uterine Cancer Sister      Coronary Artery Disease No family hx of          Current Outpatient Prescriptions   Medication Sig Dispense Refill     ASPIRIN 81 MG OR TABS 1 TABLET DAILY       atorvastatin (LIPITOR) 10 MG tablet Take 1 tablet (10 mg) by mouth daily 90 tablet 3     hydrochlorothiazide (MICROZIDE) 12.5 MG capsule Take 1 capsule (12.5 mg) by mouth every morning 90 capsule 3     labetalol (NORMODYNE) 100 MG tablet Take 2 tablets (200 mg) by mouth 2 times daily 360 tablet 3     Multiple Vitamin (DAILY MULTIVITAMIN PO) Take  by mouth.       Naproxen Sodium (ALEVE PO) Take by mouth 2 times daily (with meals)       pramipexole (MIRAPEX) 0.125 MG tablet Take 1 tablet (0.125 mg) by mouth At Bedtime 90 tablet 3     Allergies   Allergen Reactions     Erythromycin Rash     Penicillins Rash           ROS:  Constitutional, HEENT, cardiovascular, pulmonary, GI, , musculoskeletal, neuro, skin, endocrine and psych systems are negative, except as otherwise noted.    OBJECTIVE:   /79  Pulse 74  Temp 97.8  F (36.6  C) (Tympanic)  Ht 5' 4.5\" (1.638 m)  Wt 179 lb 12.8 oz (81.6 kg)  Breastfeeding? No  BMI 30.39 kg/m2 Estimated body mass index is 30.39 kg/(m^2) as calculated from the following:    Height as of this encounter: 5' 4.5\" (1.638 m).    Weight as of this encounter: 179 lb 12.8 oz (81.6 kg).  EXAM:   GENERAL: healthy, alert and no distress  EYES: Eyes grossly normal to inspection, PERRL and conjunctivae and sclerae normal  HENT: Using an otoscope, noted soft bilateral cerumen impaction, this was removed easily an ear curette to reveal normal ear canals and TM's normal, nose and mouth without ulcers or lesions  NECK: no adenopathy, no asymmetry, masses, or scars and thyroid normal to palpation  RESP: lungs clear to auscultation - no " rales, rhonchi or wheezes  BREAST: normal without masses, tenderness or nipple discharge and no palpable axillary masses or adenopathy  CV: regular rate and rhythm, normal S1 S2, no S3 or S4, no murmur, click or rub, no peripheral edema and peripheral pulses strong  ABDOMEN: soft, nontender, no hepatosplenomegaly, no masses and bowel sounds normal  MS: no gross musculoskeletal defects noted, no edema  SKIN: no suspicious lesions or rashes  NEURO: Normal strength and tone, mentation intact and speech normal  PSYCH: mentation appears normal, affect normal/bright    Diagnostic Test Results:  Labs drawn and in process    ASSESSMENT / PLAN:   Andree was seen today for physical.    Diagnoses and all orders for this visit:    Medicare annual wellness visit, subsequent    Postmenopausal estrogen deficiency  -     DX Hip/Pelvis/Spine; Future    Hyperlipidemia LDL goal <100; on Atorvastatin  -     Lipid Profile (Chol, Trig, HDL, LDL calc)  -     Comprehensive metabolic panel (BMP + Alb, Alk Phos, ALT, AST, Total. Bili, TP)  -     atorvastatin (LIPITOR) 10 MG tablet; Take 1 tablet (10 mg) by mouth daily  -     TSH with free T4 reflex    Essential hypertension with goal blood pressure less than 140/90, controlled  -     Comprehensive metabolic panel (BMP + Alb, Alk Phos, ALT, AST, Total. Bili, TP)  -     Refill: hydrochlorothiazide (MICROZIDE) 12.5 MG capsule; Take 1 capsule (12.5 mg) by mouth every morning  -     Refill: labetalol (NORMODYNE) 100 MG tablet; Take 2 tablets (200 mg) by mouth 2 times daily    Restless leg syndrome, controlled  -     Refill: pramipexole (MIRAPEX) 0.125 MG tablet; Take 1 tablet (0.125 mg) by mouth At Bedtime  -     CBC with platelets    Chronic bilateral low back pain without sciatica  -     XR Lumbar Spine 2/3 Views  In the interim take OTC analgesics and back strengthening exercises.     Bilateral impacted cerumen  -     REMOVE IMPACTED CERUMEN  Successfully removed with an ear curette here in the  "office, no immediate complications.     Advanced directives, counseling/discussion  Literature was provided to assist patient in preparing an advanced directive    Will notify her with results.    End of Life Planning:  Patient currently has an advanced directive: No.  I have verified the patient's ablity to prepare an advanced directive/make health care decisions.  Literature was provided to assist patient in preparing an advanced directive.    COUNSELING:  Reviewed preventive health counseling, as reflected in patient instructions       Regular exercise       Healthy diet/nutrition    BP Readings from Last 1 Encounters:   10/05/18 129/79     Estimated body mass index is 30.39 kg/(m^2) as calculated from the following:    Height as of this encounter: 5' 4.5\" (1.638 m).    Weight as of this encounter: 179 lb 12.8 oz (81.6 kg).      Weight management plan: Discussed healthy diet and exercise guidelines and patient will follow up in 12 months in clinic to re-evaluate.     reports that she quit smoking about 27 years ago. She has never used smokeless tobacco.      Appropriate preventive services were discussed with this patient, including applicable screening as appropriate for cardiovascular disease, diabetes, osteopenia/osteoporosis, and glaucoma.  As appropriate for age/gender, discussed screening for colorectal cancer, prostate cancer, breast cancer, and cervical cancer. Checklist reviewing preventive services available has been given to the patient.    Reviewed patients plan of care and provided an AVS. The Basic Care Plan (routine screening as documented in Health Maintenance) for Andree meets the Care Plan requirement. This Care Plan has been established and reviewed with the Patient.    Counseling Resources:  ATP IV Guidelines  Pooled Cohorts Equation Calculator  Breast Cancer Risk Calculator  FRAX Risk Assessment  ICSI Preventive Guidelines  Dietary Guidelines for Americans, 2010  USDA's MyPlate  ASA " Prophylaxis  Lung CA Screening    Follow up annually and as needed thoughout the year.    Dona Bell MD  [facility]

## 2018-10-05 ENCOUNTER — RADIANT APPOINTMENT (OUTPATIENT)
Dept: GENERAL RADIOLOGY | Facility: CLINIC | Age: 68
End: 2018-10-05
Attending: FAMILY MEDICINE
Payer: COMMERCIAL

## 2018-10-05 ENCOUNTER — OFFICE VISIT (OUTPATIENT)
Dept: FAMILY MEDICINE | Facility: CLINIC | Age: 68
End: 2018-10-05
Payer: COMMERCIAL

## 2018-10-05 VITALS
DIASTOLIC BLOOD PRESSURE: 79 MMHG | HEART RATE: 74 BPM | HEIGHT: 65 IN | TEMPERATURE: 97.8 F | BODY MASS INDEX: 29.96 KG/M2 | SYSTOLIC BLOOD PRESSURE: 129 MMHG | WEIGHT: 179.8 LBS

## 2018-10-05 DIAGNOSIS — G89.29 CHRONIC BILATERAL LOW BACK PAIN WITHOUT SCIATICA: ICD-10-CM

## 2018-10-05 DIAGNOSIS — Z71.89 ADVANCED DIRECTIVES, COUNSELING/DISCUSSION: ICD-10-CM

## 2018-10-05 DIAGNOSIS — I10 ESSENTIAL HYPERTENSION WITH GOAL BLOOD PRESSURE LESS THAN 140/90: ICD-10-CM

## 2018-10-05 DIAGNOSIS — H61.23 BILATERAL IMPACTED CERUMEN: ICD-10-CM

## 2018-10-05 DIAGNOSIS — Z00.00 MEDICARE ANNUAL WELLNESS VISIT, SUBSEQUENT: Primary | ICD-10-CM

## 2018-10-05 DIAGNOSIS — Z78.0 POSTMENOPAUSAL ESTROGEN DEFICIENCY: ICD-10-CM

## 2018-10-05 DIAGNOSIS — M54.50 CHRONIC BILATERAL LOW BACK PAIN WITHOUT SCIATICA: ICD-10-CM

## 2018-10-05 DIAGNOSIS — E78.5 HYPERLIPIDEMIA LDL GOAL <100: ICD-10-CM

## 2018-10-05 DIAGNOSIS — G25.81 RESTLESS LEG SYNDROME: ICD-10-CM

## 2018-10-05 LAB
ALBUMIN SERPL-MCNC: 3.8 G/DL (ref 3.4–5)
ALP SERPL-CCNC: 109 U/L (ref 40–150)
ALT SERPL W P-5'-P-CCNC: 31 U/L (ref 0–50)
ANION GAP SERPL CALCULATED.3IONS-SCNC: 10 MMOL/L (ref 3–14)
AST SERPL W P-5'-P-CCNC: 18 U/L (ref 0–45)
BILIRUB SERPL-MCNC: 0.5 MG/DL (ref 0.2–1.3)
BUN SERPL-MCNC: 11 MG/DL (ref 7–30)
CALCIUM SERPL-MCNC: 9.3 MG/DL (ref 8.5–10.1)
CHLORIDE SERPL-SCNC: 103 MMOL/L (ref 94–109)
CHOLEST SERPL-MCNC: 151 MG/DL
CO2 SERPL-SCNC: 26 MMOL/L (ref 20–32)
CREAT SERPL-MCNC: 0.71 MG/DL (ref 0.52–1.04)
ERYTHROCYTE [DISTWIDTH] IN BLOOD BY AUTOMATED COUNT: 12.9 % (ref 10–15)
GFR SERPL CREATININE-BSD FRML MDRD: 83 ML/MIN/1.7M2
GLUCOSE SERPL-MCNC: 100 MG/DL (ref 70–99)
HCT VFR BLD AUTO: 40.3 % (ref 35–47)
HDLC SERPL-MCNC: 78 MG/DL
HGB BLD-MCNC: 13.6 G/DL (ref 11.7–15.7)
LDLC SERPL CALC-MCNC: 58 MG/DL
MCH RBC QN AUTO: 30.8 PG (ref 26.5–33)
MCHC RBC AUTO-ENTMCNC: 33.7 G/DL (ref 31.5–36.5)
MCV RBC AUTO: 91 FL (ref 78–100)
NONHDLC SERPL-MCNC: 73 MG/DL
PLATELET # BLD AUTO: 355 10E9/L (ref 150–450)
POTASSIUM SERPL-SCNC: 3.8 MMOL/L (ref 3.4–5.3)
PROT SERPL-MCNC: 7.4 G/DL (ref 6.8–8.8)
RBC # BLD AUTO: 4.41 10E12/L (ref 3.8–5.2)
SODIUM SERPL-SCNC: 139 MMOL/L (ref 133–144)
TRIGL SERPL-MCNC: 74 MG/DL
TSH SERPL DL<=0.005 MIU/L-ACNC: 1.19 MU/L (ref 0.4–4)
WBC # BLD AUTO: 8 10E9/L (ref 4–11)

## 2018-10-05 PROCEDURE — 99214 OFFICE O/P EST MOD 30 MIN: CPT | Mod: 25 | Performed by: FAMILY MEDICINE

## 2018-10-05 PROCEDURE — 99397 PER PM REEVAL EST PAT 65+ YR: CPT | Mod: 25 | Performed by: FAMILY MEDICINE

## 2018-10-05 PROCEDURE — 36415 COLL VENOUS BLD VENIPUNCTURE: CPT | Performed by: FAMILY MEDICINE

## 2018-10-05 PROCEDURE — 69210 REMOVE IMPACTED EAR WAX UNI: CPT | Performed by: FAMILY MEDICINE

## 2018-10-05 PROCEDURE — 84443 ASSAY THYROID STIM HORMONE: CPT | Performed by: FAMILY MEDICINE

## 2018-10-05 PROCEDURE — 80053 COMPREHEN METABOLIC PANEL: CPT | Performed by: FAMILY MEDICINE

## 2018-10-05 PROCEDURE — 80061 LIPID PANEL: CPT | Performed by: FAMILY MEDICINE

## 2018-10-05 PROCEDURE — 85027 COMPLETE CBC AUTOMATED: CPT | Performed by: FAMILY MEDICINE

## 2018-10-05 PROCEDURE — 72100 X-RAY EXAM L-S SPINE 2/3 VWS: CPT | Mod: FY

## 2018-10-05 RX ORDER — PRAMIPEXOLE DIHYDROCHLORIDE 0.12 MG/1
0.12 TABLET ORAL AT BEDTIME
Qty: 90 TABLET | Refills: 3 | Status: SHIPPED | OUTPATIENT
Start: 2018-10-05 | End: 2019-09-23

## 2018-10-05 RX ORDER — ATORVASTATIN CALCIUM 10 MG/1
10 TABLET, FILM COATED ORAL DAILY
Qty: 90 TABLET | Refills: 3 | Status: SHIPPED | OUTPATIENT
Start: 2018-10-05 | End: 2019-09-23

## 2018-10-05 RX ORDER — HYDROCHLOROTHIAZIDE 12.5 MG/1
12.5 CAPSULE ORAL EVERY MORNING
Qty: 90 CAPSULE | Refills: 3 | Status: SHIPPED | OUTPATIENT
Start: 2018-10-05 | End: 2019-09-23

## 2018-10-05 RX ORDER — LABETALOL 100 MG/1
200 TABLET, FILM COATED ORAL 2 TIMES DAILY
Qty: 360 TABLET | Refills: 3 | Status: SHIPPED | OUTPATIENT
Start: 2018-10-05 | End: 2019-09-23

## 2018-10-05 NOTE — MR AVS SNAPSHOT
After Visit Summary   10/5/2018    Andree Trujillo    MRN: [id number]           Patient Information     Date Of Birth          1950        Visit Information        Provider Department      10/5/2018 8:30 AM Dona Bell MD [facility]        Today's Diagnoses     Medicare annual wellness visit, subsequent    -  1    Screening for osteoporosis        Hypertension goal BP (blood pressure) < 140/90        Hyperlipidemia LDL goal <100        Essential hypertension with goal blood pressure less than 140/90        Restless leg syndrome        Postmenopausal estrogen deficiency        Chronic bilateral low back pain without sciatica        Bilateral impacted cerumen          Care Instructions      Preventive Health Recommendations  Female Ages 65 +    Yearly exam:     See your health care provider every year in order to  o Review health changes.   o Discuss preventive care.    o Review your medicines if your doctor has prescribed any.      You no longer need a yearly Pap test unless you've had an abnormal Pap test in the past 10 years. If you have vaginal symptoms, such as bleeding or discharge, be sure to talk with your provider about a Pap test.      Every 1 to 2 years, have a mammogram.  If you are over 69, talk with your health care provider about whether or not you want to continue having screening mammograms.      Every 10 years, have a colonoscopy. Or, have a yearly FIT test (stool test). These exams will check for colon cancer.       Have a cholesterol test every 5 years, or more often if your doctor advises it.       Have a diabetes test (fasting glucose) every three years. If you are at risk for diabetes, you should have this test more often.       At age 65, have a bone density scan (DEXA) to check for osteoporosis (brittle bone disease).    Shots:    Get a flu shot each year.    Get a tetanus shot every 10 years.    Talk to your doctor about your pneumonia vaccines. There are  now two you should receive - Pneumovax (PPSV 23) and Prevnar (PCV 13).    Talk to your pharmacist about the shingles vaccine.    Talk to your doctor about the hepatitis B vaccine.    Nutrition:     Eat at least 5 servings of fruits and vegetables each day.      Eat whole-grain bread, whole-wheat pasta and brown rice instead of white grains and rice.      Get adequate about Calcium and Vitamin D.     Lifestyle    Exercise at least 150 minutes a week (30 minutes a day, 5 days a week). This will help you control your weight and prevent disease.      Limit alcohol to one drink per day.      No smoking.       Wear sunscreen to prevent skin cancer.       See your dentist twice a year for an exam and cleaning.      See your eye doctor every 1 to 2 years to screen for conditions such as glaucoma, macular degeneration, cataracts, etc     Preventive Health Recommendations    Female Ages 65 +    Yearly exam:     See your health care provider every year in order to  o Review health changes.   o Discuss preventive care.    o Review your medicines if your doctor has prescribed any.      You no longer need a yearly Pap test unless you've had an abnormal Pap test in the past 10 years. If you have vaginal symptoms, such as bleeding or discharge, be sure to talk with your provider about a Pap test.      Every 1 to 2 years, have a mammogram.  If you are over 69, talk with your health care provider about whether or not you want to continue having screening mammograms.      Every 10 years, have a colonoscopy. Or, have a yearly FIT test (stool test). These exams will check for colon cancer.       Have a cholesterol test every 5 years, or more often if your doctor advises it.       Have a diabetes test (fasting glucose) every three years. If you are at risk for diabetes, you should have this test more often.       At age 65, have a bone density scan (DEXA) to check for osteoporosis (brittle bone disease).    Shots:    Get a flu shot each  year.    Get a tetanus shot every 10 years.    Talk to your doctor about your pneumonia vaccines. There are now two you should receive - Pneumovax (PPSV 23) and Prevnar (PCV 13).    Talk to your pharmacist about the shingles vaccine.    Talk to your doctor about the hepatitis B vaccine.    Nutrition:     Eat at least 5 servings of fruits and vegetables each day.      Eat whole-grain bread, whole-wheat pasta and brown rice instead of white grains and rice.      Get adequate Calcium and Vitamin D.     Lifestyle    Exercise at least 150 minutes a week (30 minutes a day, 5 days a week). This will help you control your weight and prevent disease.      Limit alcohol to one drink per day.      No smoking.       Wear sunscreen to prevent skin cancer.       See your dentist twice a year for an exam and cleaning.      See your eye doctor every 1 to 2 years to screen for conditions such as glaucoma, macular degeneration and cataracts.          Follow-ups after your visit        Follow-up notes from your care team     Return in about 1 year (around 10/5/2019) for Physical Exam and as needed throughout the year.      Future tests that were ordered for you today     Open Future Orders        Priority Expected Expires Ordered    DX Hip/Pelvis/Spine Routine  10/5/2019 10/5/2018            Who to contact     Normal or non-critical lab and imaging results will be communicated to you by MyChart, letter or phone within 4 business days after the clinic has received the results. If you do not hear from us within 7 days, please contact the clinic through MyChart or phone. If you have a critical or abnormal lab result, we will notify you by phone as soon as possible.  Submit refill requests through [employer] or call your pharmacy and they will forward the refill request to us. Please allow 3 business days for your refill to be completed.          If you need to speak with a  for additional information , please call:  "[phone]             Additional Information About Your Visit        [employer] Information     op5 lets you send messages to your doctor, view your test results, renew your prescriptions, schedule appointments and more. To sign up, go to [email or web address] . Click on \"Log in\" on the left side of the screen, which will take you to the Welcome page. Then click on \"Sign up Now\" on the right side of the page.     You will be asked to enter the access code listed below, as well as some personal information. Please follow the directions to create your username and password.     Your access code is: [email or web address]  Expires: 1/3/2019  9:12 AM     Your access code will  in 90 days. If you need help or a new code, please call your [place] clinic or [phone].        Care EveryWhere ID     This is your Care EveryWhere ID. This could be used by other organizations to access your [place] medical records  [id number]        Your Vitals Were     Pulse Temperature Height Breastfeeding? BMI (Body Mass Index)       74 97.8  F (36.6  C) (Tympanic) 5' 4.5\" (1.638 m) No 30.39 kg/m2        Blood Pressure from Last 3 Encounters:   10/05/18 129/79   18 136/85   11/10/17 138/76    Weight from Last 3 Encounters:   10/05/18 179 lb 12.8 oz (81.6 kg)   18 176 lb (79.8 kg)   11/10/17 177 lb 12.8 oz (80.6 kg)              We Performed the Following     CBC with platelets     Comprehensive metabolic panel (BMP + Alb, Alk Phos, ALT, AST, Total. Bili, TP)     Lipid Profile (Chol, Trig, HDL, LDL calc)     REMOVE IMPACTED CERUMEN     TSH with free T4 reflex     XR Lumbar Spine 2/3 Views          Where to get your medicines      These medications [id number] [place] - [address]  [address] [place] [address]     Phone:  [phone]     atorvastatin 10 MG tablet    hydrochlorothiazide 12.5 MG capsule    labetalol 100 MG tablet    pramipexole 0.125 MG tablet          Primary Care " Provider Office Phone # Fax #    [facility] [phone]       [address] [facility] [address]        Equal Access to Services     JODY BRIGGS : Hadii kali ku hadanastasiiao Sokaurali, waaxda luqadaha, qaybta kaalmada adeegyada, mitch pretty laEvinchiara fregoso. So [facility] [phone].    ATENCIÓN: Si habla español, tiene a jaeger disposición servicios gratuitos de asistencia lingüística. Llame al [phone].    We comply with applicable federal civil rights laws and [place] laws. We do not discriminate on the basis of race, color, national origin, age, disability, sex, sexual orientation, or gender identity.            Thank you!     Thank you for choosing [facility]  for your care. Our goal is always to provide you with excellent care. Hearing back from our patients is one way we can continue to improve our services. Please take a few minutes to complete the written survey that you may receive in the mail after your visit with us. Thank you!             Your Updated Medication List - Protect others around you: Learn how to safely use, store and throw away your medicines at www.disposemymeds.org.          This list is accurate as of 10/5/18  9:12 AM.  Always use your most recent med list.                   Brand Name Dispense Instructions for use Diagnosis    ALEVE PO      Take by mouth 2 times daily (with meals)        aspirin 81 MG tablet      1 TABLET DAILY        atorvastatin 10 MG tablet    LIPITOR    90 tablet    Take 1 tablet (10 mg) by mouth daily    Hyperlipidemia LDL goal <100       DAILY MULTIVITAMIN PO      Take  by mouth.        hydrochlorothiazide 12.5 MG capsule    MICROZIDE    90 capsule    Take 1 capsule (12.5 mg) by mouth every morning    Essential hypertension with goal blood pressure less than 140/90       labetalol 100 MG tablet    NORMODYNE    360 tablet    Take 2 tablets (200 mg) by mouth 2 times daily    Essential hypertension with goal blood  pressure less than 140/90       pramipexole 0.125 MG tablet    MIRAPEX    90 tablet    Take 1 tablet (0.125 mg) by mouth At Bedtime    Restless leg syndrome

## 2018-10-05 NOTE — LETTER
October 10, 2018      Andree Trujillo  [address] [facility] [address]        Dear ,    We are writing to inform you of your test results.    Your labs came back normal.     Resulted Orders   Lipid Profile (Chol, Trig, HDL, LDL calc)   Result Value Ref Range    Cholesterol 151 <200 mg/dL    Triglycerides 74 <150 mg/dL      Comment:      Fasting specimen    HDL Cholesterol 78 >49 mg/dL    LDL Cholesterol Calculated 58 <100 mg/dL      Comment:      Desirable:       <100 mg/dl    Non HDL Cholesterol 73 <130 mg/dL   Comprehensive metabolic panel (BMP + Alb, Alk Phos, ALT, AST, Total. Bili, TP)   Result Value Ref Range    Sodium 139 133 - 144 mmol/L    Potassium 3.8 3.4 - 5.3 mmol/L    Chloride 103 94 - 109 mmol/L    Carbon Dioxide 26 20 - 32 mmol/L    Anion Gap 10 3 - 14 mmol/L    Glucose 100 (H) 70 - 99 mg/dL      Comment:      Fasting specimen    Urea Nitrogen 11 7 - 30 mg/dL    Creatinine 0.71 0.52 - 1.04 mg/dL    GFR Estimate 83 >60 mL/min/1.7m2      Comment:      Non  GFR Calc    GFR Estimate If Black >90 >60 mL/min/1.7m2      Comment:       GFR Calc    Calcium 9.3 8.5 - 10.1 mg/dL    Bilirubin Total 0.5 0.2 - 1.3 mg/dL    Albumin 3.8 3.4 - 5.0 g/dL    Protein Total 7.4 6.8 - 8.8 g/dL    Alkaline Phosphatase 109 40 - 150 U/L    ALT 31 0 - 50 U/L    AST 18 0 - 45 U/L   TSH with free T4 reflex   Result Value Ref Range    TSH 1.19 0.40 - 4.00 mU/L   CBC with platelets   Result Value Ref Range    WBC 8.0 4.0 - 11.0 10e9/L    RBC Count 4.41 3.8 - 5.2 10e12/L    Hemoglobin 13.6 11.7 - 15.7 g/dL    Hematocrit 40.3 35.0 - 47.0 %    MCV 91 78 - 100 fl    MCH 30.8 26.5 - 33.0 pg    MCHC 33.7 31.5 - 36.5 g/dL    RDW 12.9 10.0 - 15.0 %    Platelet Count 355 150 - 450 10e9/L   XR Lumbar Spine 2/3 Views    Narrative    LUMBAR SPINE TWO TO THREE VIEWS  10/5/2018 9:41 AM     HISTORY:  Chronic bilateral low back pain without sciatica.    COMPARISON: None.      Impression     IMPRESSION: Mild grade 1 anterolisthesis of L3 on L4 and L4 on L5.  There appears to be facet hypertrophy in the lower lumbar spine. Mild  degenerative changes and loss of intervertebral disc space at L3-4,  L4-5, and L5-S1. No significant loss of vertebral body height.    TAYLER LEON MD       If you have any questions or concerns, please call the clinic at the number listed above.       Sincerely,        Dona Bell MD/cassiao

## 2018-10-05 NOTE — LETTER
October 9, 2018      Andree Trujillo  [address] [facility] [address]        Dear ,    We are writing to inform you of your test results.    Your back X ray showed degenerative (arthritic) changes at multiple levels in your back.   You can take OTC Tylenol/Advil as needed.   Physical Therapy may also help. If you would a referral; please let me know.     Resulted Orders   XR Lumbar Spine 2/3 Views    Narrative    LUMBAR SPINE TWO TO THREE VIEWS  10/5/2018 9:41 AM     HISTORY:  Chronic bilateral low back pain without sciatica.    COMPARISON: None.      Impression    IMPRESSION: Mild grade 1 anterolisthesis of L3 on L4 and L4 on L5.  There appears to be facet hypertrophy in the lower lumbar spine. Mild  degenerative changes and loss of intervertebral disc space at L3-4,  L4-5, and L5-S1. No significant loss of vertebral body height.    TAYLER LEON MD       If you have any questions or concerns, please call the clinic at the number listed above.       Sincerely,        Dona Bell MD/mp

## 2019-09-23 ENCOUNTER — OFFICE VISIT (OUTPATIENT)
Dept: FAMILY MEDICINE | Facility: CLINIC | Age: 69
End: 2019-09-23
Payer: COMMERCIAL

## 2019-09-23 VITALS
WEIGHT: 169.8 LBS | SYSTOLIC BLOOD PRESSURE: 134 MMHG | OXYGEN SATURATION: 97 % | RESPIRATION RATE: 20 BRPM | HEART RATE: 63 BPM | DIASTOLIC BLOOD PRESSURE: 83 MMHG | TEMPERATURE: 97.7 F | BODY MASS INDEX: 28.7 KG/M2

## 2019-09-23 DIAGNOSIS — Z23 NEED FOR VACCINATION: ICD-10-CM

## 2019-09-23 DIAGNOSIS — G25.81 RESTLESS LEG SYNDROME: ICD-10-CM

## 2019-09-23 DIAGNOSIS — G44.219 EPISODIC TENSION-TYPE HEADACHE, NOT INTRACTABLE: ICD-10-CM

## 2019-09-23 DIAGNOSIS — I10 ESSENTIAL HYPERTENSION WITH GOAL BLOOD PRESSURE LESS THAN 140/90: Primary | ICD-10-CM

## 2019-09-23 DIAGNOSIS — H61.23 BILATERAL IMPACTED CERUMEN: ICD-10-CM

## 2019-09-23 DIAGNOSIS — M51.369 DDD (DEGENERATIVE DISC DISEASE), LUMBAR: ICD-10-CM

## 2019-09-23 DIAGNOSIS — E78.5 HYPERLIPIDEMIA LDL GOAL <100: ICD-10-CM

## 2019-09-23 DIAGNOSIS — Z12.11 SPECIAL SCREENING FOR MALIGNANT NEOPLASMS, COLON: ICD-10-CM

## 2019-09-23 LAB
ALBUMIN SERPL-MCNC: 4 G/DL (ref 3.4–5)
ALP SERPL-CCNC: 133 U/L (ref 40–150)
ALT SERPL W P-5'-P-CCNC: 24 U/L (ref 0–50)
ANION GAP SERPL CALCULATED.3IONS-SCNC: 8 MMOL/L (ref 3–14)
AST SERPL W P-5'-P-CCNC: 15 U/L (ref 0–45)
BILIRUB SERPL-MCNC: 0.4 MG/DL (ref 0.2–1.3)
BUN SERPL-MCNC: 13 MG/DL (ref 7–30)
CALCIUM SERPL-MCNC: 9.3 MG/DL (ref 8.5–10.1)
CHLORIDE SERPL-SCNC: 106 MMOL/L (ref 94–109)
CHOLEST SERPL-MCNC: 193 MG/DL
CO2 SERPL-SCNC: 26 MMOL/L (ref 20–32)
CREAT SERPL-MCNC: 0.68 MG/DL (ref 0.52–1.04)
GFR SERPL CREATININE-BSD FRML MDRD: >90 ML/MIN/{1.73_M2}
GLUCOSE SERPL-MCNC: 103 MG/DL (ref 70–99)
HDLC SERPL-MCNC: 77 MG/DL
LDLC SERPL CALC-MCNC: 99 MG/DL
NONHDLC SERPL-MCNC: 116 MG/DL
POTASSIUM SERPL-SCNC: 3.9 MMOL/L (ref 3.4–5.3)
PROT SERPL-MCNC: 7.4 G/DL (ref 6.8–8.8)
SODIUM SERPL-SCNC: 140 MMOL/L (ref 133–144)
TRIGL SERPL-MCNC: 85 MG/DL

## 2019-09-23 PROCEDURE — 99214 OFFICE O/P EST MOD 30 MIN: CPT | Mod: 25 | Performed by: FAMILY MEDICINE

## 2019-09-23 PROCEDURE — 69210 REMOVE IMPACTED EAR WAX UNI: CPT | Mod: 50 | Performed by: FAMILY MEDICINE

## 2019-09-23 PROCEDURE — 36415 COLL VENOUS BLD VENIPUNCTURE: CPT | Performed by: FAMILY MEDICINE

## 2019-09-23 PROCEDURE — 90732 PPSV23 VACC 2 YRS+ SUBQ/IM: CPT | Performed by: FAMILY MEDICINE

## 2019-09-23 PROCEDURE — G0009 ADMIN PNEUMOCOCCAL VACCINE: HCPCS | Performed by: FAMILY MEDICINE

## 2019-09-23 PROCEDURE — 80053 COMPREHEN METABOLIC PANEL: CPT | Performed by: FAMILY MEDICINE

## 2019-09-23 PROCEDURE — 80061 LIPID PANEL: CPT | Performed by: FAMILY MEDICINE

## 2019-09-23 RX ORDER — ATORVASTATIN CALCIUM 10 MG/1
10 TABLET, FILM COATED ORAL DAILY
Qty: 90 TABLET | Refills: 3 | Status: SHIPPED | OUTPATIENT
Start: 2019-09-23 | End: 2020-11-17

## 2019-09-23 RX ORDER — LABETALOL 100 MG/1
200 TABLET, FILM COATED ORAL 2 TIMES DAILY
Qty: 360 TABLET | Refills: 3 | Status: SHIPPED | OUTPATIENT
Start: 2019-09-23 | End: 2020-12-02

## 2019-09-23 RX ORDER — CYCLOBENZAPRINE HCL 5 MG
5 TABLET ORAL 3 TIMES DAILY PRN
Qty: 42 TABLET | Refills: 0 | Status: SHIPPED | OUTPATIENT
Start: 2019-09-23 | End: 2021-07-20

## 2019-09-23 RX ORDER — DICLOFENAC SODIUM 75 MG/1
75 TABLET, DELAYED RELEASE ORAL 2 TIMES DAILY PRN
Qty: 30 TABLET | Refills: 1 | Status: SHIPPED | OUTPATIENT
Start: 2019-09-23 | End: 2020-12-02

## 2019-09-23 RX ORDER — PRAMIPEXOLE DIHYDROCHLORIDE 0.12 MG/1
0.12 TABLET ORAL AT BEDTIME
Qty: 90 TABLET | Refills: 3 | Status: SHIPPED | OUTPATIENT
Start: 2019-09-23 | End: 2020-11-17

## 2019-09-23 RX ORDER — HYDROCHLOROTHIAZIDE 12.5 MG/1
12.5 CAPSULE ORAL EVERY MORNING
Qty: 90 CAPSULE | Refills: 3 | Status: SHIPPED | OUTPATIENT
Start: 2019-09-23 | End: 2020-12-02 | Stop reason: DRUGHIGH

## 2019-09-23 NOTE — PATIENT INSTRUCTIONS
Patient Education     Managing Tension-type Headache Symptoms  A tension-type headache can develop slowly. Being aware of the symptoms helps you recognize a headache early. Then you can act to reduce pain and relieve tension. Methods for relieving your symptoms include self-care and medicine.    Tension-type symptoms  The earlier you recognize the symptoms of a tension-type headache, the easier it is to treat. Tension-type headaches may:    Start with fatigue, tension, or pain in the neck and shoulders    Feel like a band around the head    Be concentrated in the temple, the back of the head, behind the eyes, or in the face    Come and go, or last for days, weeks, or even longer    Involve referred pain -- this means that the area that hurts may not be where the problem starts  Self-care during a tension-type headache  When you have a tension-type headache, there are things you can do to relax, loosen muscles, and reduce the pain:    Brush your scalp lightly with a soft hairbrush.    Give yourself a massage. Knead the muscles running from your shoulders up the back of your skull. Or ask a friend to gently massage your neck and shoulders.    Use an ice pack. Wrap a thin cloth around a cold pack, a cold can of soda, or a bag of frozen vegetables. Apply this directly to the place where you feel pain (such as your neck or temples).    Use moist heat to relax your muscles. Take a warm shower or bath. Or drape a warm, moist towel around your neck and shoulders.  Relieving pain and tension  Over-the-counter or prescription medicine can help relieve pain. Another way to reduce your pain is to use relaxation techniques to loosen tight muscles.  Medicine  Medicine used for tension-type headaches include the following:    NSAIDs (nonsteroidal anti-inflammatories), such as aspirin and ibuprofen, relieve inflammation and help block pain signals.    Acetaminophen treats pain, and some formulations contain caffeine.     Muscle  relaxants can reduce painful muscle contractions.  Taking medicine safely  Be aware that:    Taking analgesics (pain relievers) or drinking too much coffee may lead to rebound headaches (frequent or severe headaches that can happen if you miss a dose of medication), so take pain medicines only as needed. If you think you have these headaches, contact your healthcare provider.    Taking too much medicine can cause sleep problems or stomach upset. Some over-the-counter headache medications may contain caffeine. These may disrupt sleep and worsen pain.  Relaxation techniques  A , class, book, or tape may help you learn these techniques. One or more of these methods may work for you:    Deep breathing. Slow, calm, deep breathing can help you relax. Breathe in for a count of 5 or more. Then slowly let the breath out.    Visualization. Imagining a peaceful, secure scene can give you a sense of control over your body and surroundings.    Progressive relaxation. This is done by tightening and then releasing muscle groups. Start at the top of your head and work your way down your body. Tighten each muscle group for 5 to 10 seconds. Then release the muscle group for the same amount of time.    Biofeedback. This is a type of training in which you learn to control certain physical functions and responses. This helps you learn to reduce muscle tension.  Date Last Reviewed: 4/1/2018 2000-2018 [employer]. [address] [place] [address], [place] [address]. All rights reserved. This information is not intended as a substitute for professional medical care. Always follow your healthcare professional's instructions.           Patient Education     Back Care Tips    Caring for your back  These are things you can do to prevent a recurrence of acute back pain and to reduce symptoms from chronic back pain:    Maintain a healthy weight. If you are overweight, losing weight will help most types of back pain.    Exercise is  an important part of recovery from most types of back pain. The muscles behind and in front of the spine support the back. This means strengthening both the back muscles and the abdominal muscles will provide better support for your spine.     Swimming and brisk walking are good overall exercises to improve your fitness level.    Practice safe lifting methods (below).    Practice good posture when sitting, standing and walking. Avoid prolonged sitting. This puts more stress on the lower back than standing or walking.    Wear quality shoes with sufficient arch support. Foot and ankle alignment can affect back symptoms. Women should avoid wearing high heels.    Therapeutic massage can help relax the back muscles without stretching them.    During the first 24 to 72 hours after an acute injury or flare-up of chronic back pain, apply an ice pack to the painful area for 20 minutes and then remove it for 20 minutes, over a period of 60 to 90 minutes, or several times a day. As a safety precaution, do not use a heating pad at bedtime. Sleeping on a heating pad can lead to skin burns or tissue damage.    You can alternate ice and heat therapies.  Medicines  Talk to your healthcare provider before using medicines, especially if you have other medical problems or are taking other medicines.    You may use acetaminophen or ibuprofen to control pain, unless your healthcare provider prescribed other pain medicine. If you have chronic conditions like diabetes, liver or kidney disease, stomach ulcers, or gastrointestinal bleeding, or are taking blood thinners, talk with your healthcare provider before taking any medicines.    Be careful if you are given prescription pain medicines, narcotics, or medicine for muscle spasm. They can cause drowsiness, affect your coordination, reflexes, and judgment. Do not drive or operate heavy machinery while taking these types of medicines. Take prescription pain medicine only as prescribed by  your healthcare provider.  Lumbar stretch  Here is a simple stretching exercise that will help relax muscle spasm and keep your back more limber. If exercise makes your back pain worse, don t do it.    Lie on your back with your knees bent and both feet on the ground.    Slowly raise your left knee to your chest as you flatten your lower back against the floor. Hold for 5 seconds.    Relax and repeat the exercise with your right knee.    Do 10 of these exercises for each leg.  Safe lifting method    Don t bend over at the waist to lift an object off the floor.  Instead, bend your knees and hips in a squat.     Keep your back and head upright    Hold the object close to your body, directly in front of you.    Straighten your legs to lift the object.     Lower the object to the floor in the reverse fashion.    If you must slide something across the floor, push it.  Posture tips  Sitting  Sit in chairs with straight backs or low-back support. Keep your knees lower than your hips, with your feet flat on the floor.  When driving, sit up straight. Adjust the seat forward so you are not leaning toward the steering wheel.  A small pillow or rolled towel behind your lower back may help if you are driving long distances.   Standing  When standing for long periods, shift most of your weight to one leg at a time. Alternate legs every few minutes.   Sleeping  The best way to sleep is on your side with your knees bent. Put a low pillow under your head to support your neck in a neutral spine position. Avoid thick pillows that bend your neck to one side. Put a pillow between your legs to further relax your lower back. If you sleep on your back, put pillows under your knees to support your legs in a slightly flexed position. Use a firm mattress. If your mattress sags, replace it, or use a 1/2-inch plywood board under the mattress to add support.  Follow-up care  Follow up with your healthcare provider, or as advised.  If X-rays, a  CT scan or an MRI scan were taken, they will be reviewed by a radiologist. You will be notified of any new findings that may affect your care.  Call 911  Call 911 if any of the following occur:    Trouble breathing    Confusion    Very drowsy    Fainting or loss of consciousness    Rapid or very slow heart rate    Loss of  bowel or bladder control  When to seek medical advice  Call your healthcare provider right away if any of the following occur:    Pain becomes worse or spreads to your arms or legs    Weakness or numbness in one or both arms or legs    Numbness in the groin area  Date Last Reviewed: 6/1/2016 2000-2018 [employer]. [address] [place] [id number]. All rights reserved. This information is not intended as a substitute for professional medical care. Always follow your healthcare professional's instructions.

## 2019-09-23 NOTE — LETTER
September 27, 2019      Andree Trujillo  [address] [facility] [address]        Dear ,    We are writing to inform you of your test results.    Your recent labs looked good.   Cholesterol panel was normal.   Complete Metabolic Panel (panel that checks liver function, kidney function and electrolytes) was normal.   Fasting glucose was slightly elevated- this means no evidence of diabetes at this time but you could be at risk (prediabetes).   Recommend that you cut back on carbs, eat a well balanced heart healthy diet and exercise regularly.     Resulted Orders   Comprehensive metabolic panel   Result Value Ref Range    Sodium 140 133 - 144 mmol/L    Potassium 3.9 3.4 - 5.3 mmol/L    Chloride 106 94 - 109 mmol/L    Carbon Dioxide 26 20 - 32 mmol/L    Anion Gap 8 3 - 14 mmol/L    Glucose 103 (H) 70 - 99 mg/dL      Comment:      Fasting specimen    Urea Nitrogen 13 7 - 30 mg/dL    Creatinine 0.68 0.52 - 1.04 mg/dL    GFR Estimate >90 >60 mL/min/[1.73_m2]      Comment:      Non  GFR Calc  Starting 12/18/2018, serum creatinine based estimated GFR (eGFR) will be   calculated using the Chronic Kidney Disease Epidemiology Collaboration   (CKD-EPI) equation.      GFR Estimate If Black >90 >60 mL/min/[1.73_m2]      Comment:       GFR Calc  Starting 12/18/2018, serum creatinine based estimated GFR (eGFR) will be   calculated using the Chronic Kidney Disease Epidemiology Collaboration   (CKD-EPI) equation.      Calcium 9.3 8.5 - 10.1 mg/dL    Bilirubin Total 0.4 0.2 - 1.3 mg/dL    Albumin 4.0 3.4 - 5.0 g/dL    Protein Total 7.4 6.8 - 8.8 g/dL    Alkaline Phosphatase 133 40 - 150 U/L    ALT 24 0 - 50 U/L    AST 15 0 - 45 U/L   Lipid Profile   Result Value Ref Range    Cholesterol 193 <200 mg/dL    Triglycerides 85 <150 mg/dL      Comment:      Fasting specimen    HDL Cholesterol 77 >49 mg/dL    LDL Cholesterol Calculated 99 <100 mg/dL      Comment:      Desirable:       <100 mg/dl     Non HDL Cholesterol 116 <130 mg/dL       If you have any questions or concerns, please call the clinic at the number listed above.       Sincerely,        Dona Bell MD/cassiao

## 2019-09-23 NOTE — PROGRESS NOTES
Subjective     Andree Trujillo is a 68 year old female who presents to clinic today for the following health issues:    HPI   Hyperlipidemia Follow-Up  Currently on Lipitor 10 mg      Are you having any of the following symptoms? (Select all that apply)  No complaints of shortness of breath, chest pain or pressure.  No increased sweating or nausea with activity.  No left-sided neck or arm pain.  No complaints of pain in calves when walking 1-2 blocks.    Are you regularly taking any medication or supplement to lower your cholesterol?   Yes- Atorvastatin    Are you having muscle aches or other side effects that you think could be caused by your cholesterol lowering medication?  Yes- lower back pain on right side   Last lipid panel;   Recent Labs   Lab Test 10/05/18  0926 11/10/17  0946  14  0954 10/11/13  0933   CHOL 151 162   < > 194 211*   HDL 78 78   < > 74 66   LDL 58 70   < > 106 125   TRIG 74 69   < > 68 101   CHOLHDLRATIO  --   --   --  2.6 3.2    < > = values in this interval not displayed.           Hypertension Follow-up  Currently on Hydrochlorothiazide 12.5 mg and Labetalol 200 mg BID      Do you check your blood pressure regularly outside of the clinic? No     Are you following a low salt diet? No    Are your blood pressures ever more than 140 on the top number (systolic) OR more   than 90 on the bottom number (diastolic), for example 140/90? Not sure      How many servings of fruits and vegetables do you eat daily?  0-1    On average, how many sweetened beverages do you drink each day (soda, juice, sweet tea, etc)?   0  How many days per week do you miss taking your medication? 1    What makes it hard for you to take your medications?  remembering to take     BP Readings from Last 3 Encounters:   19 134/83   10/05/18 129/79   18 136/85         PROBLEMS TO ADD ON...  Lower back pain worsening in the last 6 months  States that her older sister recently  about 7 months ago and she has  been helping to move her staff out of the apartment and she is also in the process of relocating to [place] next month- has been involved in doing a lot of Physical stuff.     She has had a lumbar x-ray done in the past that revealed degenerative lumbar disc disease.  Patient reports having self-limited episodes of low back pain in the past.  Denies having any radiating pain down her lower extremities.  No bowel or bladder incontinence.  No lower extremity weakness.      Headaches x 3 months -describes as pressure-like in her frontal and temporal area.  Intermittent, relieved with OTC NSAID.  Denies having any vision changes.  No neurologic deficits.  Thinks its stress related headache as  she is in the process of downsizing closing on her house and relocating to [place] and also mourning the loss of her older sister and yet also cleaning up her apartment.  Denies feeling anxious or depressed.    History of RLS- takes Mirapex at bedtime, requesting a refill.     Would like her ears checked-denies having any ear pain or or drainage reports a history of cerumen impaction in the past has been using OTC mineral oil in both ears.    HEALTH CARE MAINTENANCE: Due for colon cancer screening (prefers FIT testing); due for pneumonia shot.  -------------------------------------    Patient Active Problem List   Diagnosis     Osteopenia     Allergic state     CARDIOVASCULAR SCREENING; LDL GOAL LESS THAN 100     Advanced directives, counseling/discussion     Hypercholesterolemia     Hypertension goal BP (blood pressure) < 140/90     Achilles tendonitis, bilateral     Bilateral impacted cerumen     Vitamin D deficiency     Past Surgical History:   Procedure Laterality Date     HYSTERECTOMY, PAP NO LONGER INDICATED      Total hysterectomy with bilateral oophorectomy due to Pelvic pain       Social History     Tobacco Use     Smoking status: Former Smoker     Last attempt to quit: 1991     Years since quittin.7      Smokeless tobacco: Never Used   Substance Use Topics     Alcohol use: Yes     Comment: occ     Family History   Problem Relation Age of Onset     Breast Cancer Mother      Arthritis Mother      Hypertension Father      Alcohol/Drug Father      Arthritis Father      Cerebrovascular Disease Maternal Grandmother      Cancer Maternal Grandmother      Cancer Maternal Grandfather      Hypertension Paternal Grandfather      Hypertension Brother      Prostate Cancer Brother      Alcohol/Drug Brother      Allergies Brother      Diabetes Paternal Aunt      Uterine Cancer Sister      Coronary Artery Disease No family hx of          Current Outpatient Medications   Medication Sig Dispense Refill     ASPIRIN 81 MG OR TABS 1 TABLET DAILY       atorvastatin (LIPITOR) 10 MG tablet Take 1 tablet (10 mg) by mouth daily 90 tablet 3     cyclobenzaprine (FLEXERIL) 5 MG tablet Take 1 tablet (5 mg) by mouth 3 times daily as needed for muscle spasms 42 tablet 0     diclofenac (VOLTAREN) 75 MG EC tablet Take 1 tablet (75 mg) by mouth 2 times daily as needed for moderate pain (take with food) 30 tablet 1     hydrochlorothiazide (MICROZIDE) 12.5 MG capsule Take 1 capsule (12.5 mg) by mouth every morning 90 capsule 3     labetalol (NORMODYNE) 100 MG tablet Take 2 tablets (200 mg) by mouth 2 times daily 360 tablet 3     Multiple Vitamin (DAILY MULTIVITAMIN PO) Take  by mouth.       Naproxen Sodium (ALEVE PO) Take by mouth 2 times daily (with meals)       pramipexole (MIRAPEX) 0.125 MG tablet Take 1 tablet (0.125 mg) by mouth At Bedtime 90 tablet 3     Allergies   Allergen Reactions     Erythromycin Rash     Penicillins Rash         Reviewed and updated as needed this visit by Provider         Review of Systems   ROS COMP: Constitutional, HEENT, cardiovascular, pulmonary, gi and gu systems are negative, except as otherwise noted.      Objective    /83   Pulse 63   Temp 97.7  F (36.5  C) (Tympanic)   Resp 20   Wt 77 kg (169 lb 12.8 oz)    SpO2 97%   BMI 28.70 kg/m    Body mass index is 28.7 kg/m .  Physical Exam   GENERAL: healthy, alert and no distress  HENT: Initial exam revealed bilateral cerumen impaction- after removal with an ear curette- revealed normal ear canals and TM's normal, nose and mouth without ulcers or lesions  NECK: no adenopathy, no asymmetry, masses, or scars and thyroid normal to palpation  RESP: lungs clear to auscultation - no rales, rhonchi or wheezes  CV: regular rate and rhythm, normal S1 S2, no S3 or S4, no murmur, click or rub, no peripheral edema and peripheral pulses strong  NEURO: Normal strength and tone, sensory exam grossly normal, mentation intact and cranial nerves 2-12 intact    Diagnostic Test Results:  Labs drawn and in process.          Assessment & Plan     Andree was seen today for recheck medication, hypertension and lipids.    Diagnoses and all orders for this visit:    Essential hypertension with goal blood pressure less than 140/90, controlled  -    Refill:  hydrochlorothiazide (MICROZIDE) 12.5 MG capsule; Take 1 capsule (12.5 mg) by mouth every morning  -     Refill; labetalol (NORMODYNE) 100 MG tablet; Take 2 tablets (200 mg) by mouth 2 times daily  -     Comprehensive metabolic panel    Hyperlipidemia LDL goal <100  -    Refill:  atorvastatin (LIPITOR) 10 MG tablet; Take 1 tablet (10 mg) by mouth daily  -     Comprehensive metabolic panel  -     Lipid Profile    Restless leg syndrome  -     pramipexole (MIRAPEX) 0.125 MG tablet; Take 1 tablet (0.125 mg) by mouth At Bedtime    Episodic tension-type headache, not intractable  May take OTC NSAID but avoid daily use to prevent rebound headaches.  Discussed ways to reduce pain and tension    Apply a cold compress or ice pack to the pain site.    Drink fluids. If nausea makes it hard to drink, try sucking on ice.    Rest. Protect yourself from bright light and loud noises.    Calm your emotions by imagining a peaceful scene.    Massage tight neck,  shoulder, and head muscles.    To relax muscles, soak in a hot bath or use a hot shower      DDD (degenerative disc disease), lumbar  -     diclofenac (VOLTAREN) 75 MG EC tablet; Take 1 tablet (75 mg) by mouth 2 times daily as needed for moderate pain (take with food)  -     cyclobenzaprine (FLEXERIL) 5 MG tablet; Take 1 tablet (5 mg) by mouth 3 times daily as needed for muscle spasms    Bilateral impacted cerumen  -     REMOVE IMPACTED CERUMEN with a lighted ear curette - successful. Patient tolerated procedure well.     Need for vaccination  -     Pneumococcal vaccine 23 valent PPSV23  (Pneumovax) [79025]  -     ADMIN: Vaccine, Initial (80588)    Special screening for malignant neoplasms, colon  -     Fecal colorectal cancer screen FIT - Future (S+30); Future        Return in about 1 month (around 10/23/2019) for Follow up, Annual Wellness Visit at earliest convenience..     Patient is relocating to [place], [place].     Dona Bell MD  [facility] (formerly [facility])[3]

## 2019-09-25 PROCEDURE — 82274 ASSAY TEST FOR BLOOD FECAL: CPT | Performed by: FAMILY MEDICINE

## 2019-09-29 LAB — HEMOCCULT STL QL IA: NEGATIVE

## 2019-09-30 DIAGNOSIS — Z12.11 SPECIAL SCREENING FOR MALIGNANT NEOPLASMS, COLON: ICD-10-CM

## 2020-02-26 ENCOUNTER — TRANSFERRED RECORDS (OUTPATIENT)
Dept: HEALTH INFORMATION MANAGEMENT | Facility: CLINIC | Age: 70
End: 2020-02-26

## 2020-06-01 ENCOUNTER — RECORDS - HEALTHEAST (OUTPATIENT)
Dept: LAB | Facility: CLINIC | Age: 70
End: 2020-06-01

## 2020-06-01 LAB
ANION GAP SERPL CALCULATED.3IONS-SCNC: 6 MMOL/L (ref 5–18)
BUN SERPL-MCNC: 11 MG/DL (ref 8–22)
CALCIUM SERPL-MCNC: 8.9 MG/DL (ref 8.5–10.5)
CHLORIDE BLD-SCNC: 105 MMOL/L (ref 98–107)
CO2 SERPL-SCNC: 30 MMOL/L (ref 22–31)
CREAT SERPL-MCNC: 0.71 MG/DL (ref 0.6–1.1)
GFR SERPL CREATININE-BSD FRML MDRD: >60 ML/MIN/1.73M2
GLUCOSE BLD-MCNC: 100 MG/DL (ref 70–125)
POTASSIUM BLD-SCNC: 4.2 MMOL/L (ref 3.5–5)
SODIUM SERPL-SCNC: 141 MMOL/L (ref 136–145)

## 2020-08-24 ENCOUNTER — TRANSFERRED RECORDS (OUTPATIENT)
Dept: HEALTH INFORMATION MANAGEMENT | Facility: CLINIC | Age: 70
End: 2020-08-24

## 2020-11-16 DIAGNOSIS — E78.5 HYPERLIPIDEMIA LDL GOAL <100: ICD-10-CM

## 2020-11-16 DIAGNOSIS — G25.81 RESTLESS LEG SYNDROME: ICD-10-CM

## 2020-11-17 RX ORDER — PRAMIPEXOLE DIHYDROCHLORIDE 0.12 MG/1
TABLET ORAL
Qty: 90 TABLET | Refills: 0 | Status: SHIPPED | OUTPATIENT
Start: 2020-11-17 | End: 2022-05-24

## 2020-11-17 RX ORDER — ATORVASTATIN CALCIUM 10 MG/1
TABLET, FILM COATED ORAL
Qty: 90 TABLET | Refills: 0 | Status: SHIPPED | OUTPATIENT
Start: 2020-11-17 | End: 2020-12-02

## 2020-11-17 NOTE — TELEPHONE ENCOUNTER
Next 5 appointments (look out 90 days)    Dec 02, 2020  9:30 AM  Office Visit with Dona Bell MD  [facility] ([facility]) [address] [facility] [address]  [phone]        Rx refilled.    Helen Gupta BSN, RN

## 2020-12-02 ENCOUNTER — ANCILLARY PROCEDURE (OUTPATIENT)
Dept: GENERAL RADIOLOGY | Facility: CLINIC | Age: 70
End: 2020-12-02
Attending: FAMILY MEDICINE
Payer: COMMERCIAL

## 2020-12-02 ENCOUNTER — OFFICE VISIT (OUTPATIENT)
Dept: FAMILY MEDICINE | Facility: CLINIC | Age: 70
End: 2020-12-02
Payer: COMMERCIAL

## 2020-12-02 VITALS
SYSTOLIC BLOOD PRESSURE: 138 MMHG | WEIGHT: 165.6 LBS | TEMPERATURE: 97.4 F | HEART RATE: 68 BPM | BODY MASS INDEX: 27.99 KG/M2 | RESPIRATION RATE: 16 BRPM | OXYGEN SATURATION: 97 % | DIASTOLIC BLOOD PRESSURE: 84 MMHG

## 2020-12-02 DIAGNOSIS — Z12.31 ENCOUNTER FOR SCREENING MAMMOGRAM FOR BREAST CANCER: ICD-10-CM

## 2020-12-02 DIAGNOSIS — I10 ESSENTIAL HYPERTENSION WITH GOAL BLOOD PRESSURE LESS THAN 140/90: Primary | ICD-10-CM

## 2020-12-02 DIAGNOSIS — E78.5 HYPERLIPIDEMIA LDL GOAL <100: ICD-10-CM

## 2020-12-02 DIAGNOSIS — M25.562 LEFT KNEE PAIN, UNSPECIFIED CHRONICITY: ICD-10-CM

## 2020-12-02 DIAGNOSIS — M25.551 HIP PAIN, RIGHT: ICD-10-CM

## 2020-12-02 DIAGNOSIS — Z12.11 COLON CANCER SCREENING: ICD-10-CM

## 2020-12-02 DIAGNOSIS — M51.369 DDD (DEGENERATIVE DISC DISEASE), LUMBAR: ICD-10-CM

## 2020-12-02 LAB
ALBUMIN SERPL-MCNC: 4.3 G/DL (ref 3.4–5)
ALP SERPL-CCNC: 109 U/L (ref 40–150)
ALT SERPL W P-5'-P-CCNC: 29 U/L (ref 0–50)
ANION GAP SERPL CALCULATED.3IONS-SCNC: 5 MMOL/L (ref 3–14)
AST SERPL W P-5'-P-CCNC: 17 U/L (ref 0–45)
BILIRUB SERPL-MCNC: 0.4 MG/DL (ref 0.2–1.3)
BUN SERPL-MCNC: 17 MG/DL (ref 7–30)
CALCIUM SERPL-MCNC: 9.2 MG/DL (ref 8.5–10.1)
CHLORIDE SERPL-SCNC: 103 MMOL/L (ref 94–109)
CHOLEST SERPL-MCNC: 179 MG/DL
CO2 SERPL-SCNC: 30 MMOL/L (ref 20–32)
CREAT SERPL-MCNC: 0.67 MG/DL (ref 0.52–1.04)
GFR SERPL CREATININE-BSD FRML MDRD: >90 ML/MIN/{1.73_M2}
GLUCOSE SERPL-MCNC: 100 MG/DL (ref 70–99)
HDLC SERPL-MCNC: 90 MG/DL
LDLC SERPL CALC-MCNC: 75 MG/DL
NONHDLC SERPL-MCNC: 89 MG/DL
POTASSIUM SERPL-SCNC: 4.1 MMOL/L (ref 3.4–5.3)
PROT SERPL-MCNC: 7.3 G/DL (ref 6.8–8.8)
SODIUM SERPL-SCNC: 138 MMOL/L (ref 133–144)
TRIGL SERPL-MCNC: 71 MG/DL

## 2020-12-02 PROCEDURE — 36415 COLL VENOUS BLD VENIPUNCTURE: CPT | Performed by: FAMILY MEDICINE

## 2020-12-02 PROCEDURE — 99214 OFFICE O/P EST MOD 30 MIN: CPT | Performed by: FAMILY MEDICINE

## 2020-12-02 PROCEDURE — 80053 COMPREHEN METABOLIC PANEL: CPT | Performed by: FAMILY MEDICINE

## 2020-12-02 PROCEDURE — 73502 X-RAY EXAM HIP UNI 2-3 VIEWS: CPT

## 2020-12-02 PROCEDURE — 73562 X-RAY EXAM OF KNEE 3: CPT | Mod: LT

## 2020-12-02 PROCEDURE — 80061 LIPID PANEL: CPT | Performed by: FAMILY MEDICINE

## 2020-12-02 RX ORDER — HYDROCHLOROTHIAZIDE 25 MG/1
25 TABLET ORAL DAILY
Qty: 90 TABLET | Refills: 3 | Status: SHIPPED | OUTPATIENT
Start: 2020-12-02 | End: 2020-12-02

## 2020-12-02 RX ORDER — LABETALOL 100 MG/1
200 TABLET, FILM COATED ORAL 2 TIMES DAILY
Qty: 360 TABLET | Refills: 3 | Status: SHIPPED | OUTPATIENT
Start: 2020-12-02 | End: 2021-07-20

## 2020-12-02 RX ORDER — ATORVASTATIN CALCIUM 10 MG/1
10 TABLET, FILM COATED ORAL DAILY
Qty: 90 TABLET | Refills: 3 | Status: SHIPPED | OUTPATIENT
Start: 2020-12-02 | End: 2021-12-09

## 2020-12-02 RX ORDER — LABETALOL 100 MG/1
200 TABLET, FILM COATED ORAL 2 TIMES DAILY
Qty: 360 TABLET | Refills: 3 | Status: SHIPPED | OUTPATIENT
Start: 2020-12-02 | End: 2020-12-02

## 2020-12-02 RX ORDER — ATORVASTATIN CALCIUM 10 MG/1
10 TABLET, FILM COATED ORAL DAILY
Qty: 90 TABLET | Refills: 3 | Status: SHIPPED | OUTPATIENT
Start: 2020-12-02 | End: 2020-12-02

## 2020-12-02 RX ORDER — DICLOFENAC SODIUM 75 MG/1
75 TABLET, DELAYED RELEASE ORAL 2 TIMES DAILY PRN
Qty: 30 TABLET | Refills: 1 | Status: SHIPPED | OUTPATIENT
Start: 2020-12-02 | End: 2021-07-20

## 2020-12-02 RX ORDER — GABAPENTIN 300 MG/1
CAPSULE ORAL
COMMUNITY
Start: 2020-11-16 | End: 2021-09-17

## 2020-12-02 RX ORDER — HYDROCHLOROTHIAZIDE 25 MG/1
25 TABLET ORAL DAILY
Qty: 90 TABLET | Refills: 3 | Status: SHIPPED | OUTPATIENT
Start: 2020-12-02 | End: 2021-07-20

## 2020-12-02 NOTE — LETTER
[facility]    [address] [place] [address]  [phone]                                   December 9, 2020    Andree Trujillo  [address] [address]        Dear Andree,    Here's a copy of your recent Hip X ray report.     Referral to Ortho was completed. Someone from the Orthopedics  Team will call to help you schedule.   If you haven't heard from anyone within 2 business days, please let me know.     Please contact me if you have any additional questions or concerns.     Results for orders placed or performed in visit on 12/02/20   XR Knee Left 3 Views     Status: None    Narrative    KNEE LEFT THREE VIEWS December 2, 2020 11:04 AM     HISTORY: Left knee pain, unspecified chronicity.      Impression    IMPRESSION:   1. Slight lateral patellar subluxation.  2. I cannot exclude one or two loose bodies in the notch region.  3. No fracture identified.  4. Small asymmetric dense area within the tibial proximal diaphysis.  This could represent a normal variant area of focal trabeculation, but  a small benign enchondroma or chronic bone infarct cannot be excluded.    JUANITO MORALES MD   XR Hip Right 2-3 Views     Status: None    Narrative    HIP RIGHT 2-3 VIEWS   12/2/2020 11:05 AM     HISTORY: Hip pain, right.    FINDINGS: Lower lumbar spine degenerative change.      Impression    IMPRESSION:   1. Mild femoral head osteophytosis consistent with early  osteoarthritis.  2. There is a acetabular pincer-type configuration. This can  predispose to femoroacetabular impingement, and clinical correlation  is recommended.    JUANITO MORALES MD   Lipid Profile     Status: None   Result Value Ref Range    Cholesterol 179 <200 mg/dL    Triglycerides 71 <150 mg/dL    HDL Cholesterol 90 >49 mg/dL    LDL Cholesterol Calculated 75 <100 mg/dL    Non HDL Cholesterol 89 <130 mg/dL   Comprehensive metabolic panel     Status: Abnormal   Result Value Ref Range    Sodium 138 133 - 144 mmol/L     Potassium 4.1 3.4 - 5.3 mmol/L    Chloride 103 94 - 109 mmol/L    Carbon Dioxide 30 20 - 32 mmol/L    Anion Gap 5 3 - 14 mmol/L    Glucose 100 (H) 70 - 99 mg/dL    Urea Nitrogen 17 7 - 30 mg/dL    Creatinine 0.67 0.52 - 1.04 mg/dL    GFR Estimate >90 >60 mL/min/[1.73_m2]    GFR Estimate If Black >90 >60 mL/min/[1.73_m2]    Calcium 9.2 8.5 - 10.1 mg/dL    Bilirubin Total 0.4 0.2 - 1.3 mg/dL    Albumin 4.3 3.4 - 5.0 g/dL    Protein Total 7.3 6.8 - 8.8 g/dL    Alkaline Phosphatase 109 40 - 150 U/L    ALT 29 0 - 50 U/L    AST 17 0 - 45 U/L       If you have any questions please call the clinic at [phone]    Sincerely,    Dona Bell MD  bmd

## 2020-12-02 NOTE — PROGRESS NOTES
Subjective     Andree Trujillo is a 70 year old female who presents to clinic today for the following health issues:    HPI         Hypertension Follow-up  Currently on Hydrochlorothiazide 12.5 mg and Labetalol 200 mg BID.  Blood pressure is elevated in the clinic today and states that she's noticed that it's been elevated in other clinic office settings she's been to including Dental office and Ortho Specialty clinic.     Do you check your blood pressure regularly outside of the clinic? Yes     Are you following a low salt diet? No    Are your blood pressures ever more than 140 on the top number (systolic) OR more   than 90 on the bottom number (diastolic), for example 140/90? Yes      How many servings of fruits and vegetables do you eat daily?  0-1    On average, how many sweetened beverages do you drink each day (Examples: soda, juice, sweet tea, etc.  Do NOT count diet or artificially sweetened beverages)?   0    How many days per week do you exercise enough to make your heart beat faster? none    How many minutes a day do you exercise enough to make your heart beat faster? None     How many days per week do you miss taking your medication? 0      Hyperlipidemia Follow-Up      Are you regularly taking any medication or supplement to lower your cholesterol?   Yes- Atorvastatin 10 mg/day    Are you having muscle aches or other side effects that you think could be caused by your cholesterol lowering medication?  No     Last lipid panel  Recent Labs   Lab Test 09/23/19  0942 10/05/18  0926 11/07/14  0954 11/07/14  0954 10/11/13  0933   CHOL 193 151   < > 194 211*   HDL 77 78   < > 74 66   LDL 99 58   < > 106 125   TRIG 85 74   < > 68 101   CHOLHDLRATIO  --   --   --  2.6 3.2    < > = values in this interval not displayed.           Musculoskeletal problem/pain  Onset/Duration: x4 months   Description  Location: knee - left, right hip with groin pain  Joint Swelling: no  Redness: no  Pain: YES- ache  Warmth:  no  Intensity:  6/10 effects her walking   Progression of Symptoms:  Same; intermittnet  Accompanying signs and symptoms:   Fevers: no  Numbness/tingling/weakness: YES- weakness; feels like it will go out on her while she is walking   History  Trauma to the area: YES- has twisted her knee a few times   Recent illness:  no  Previous similar problem: no  Previous evaluation:  no  Precipitating or alleviating factors:  Aggravating factors include:  Increased pain with walking, turning, getting out of bed, and laying down.   Therapies tried and outcome: aleve         States that she is currently seeing [facility] for Lumbar DDD. Was prescribed Gabapentin. MURTAZA signed and sent.   States that she currently lives in [place].  Would like to consider an Orthopedic provider closer to [place]- will check with her insurance.       HEALTH CARE MAINTENANCE: due for Colon cancer screening and Mammogram.         Review of Systems   Constitutional, HEENT, cardiovascular, pulmonary, gi and gu systems are negative, except as otherwise noted.      Objective    /84   Pulse 68   Temp 97.4  F (36.3  C) (Tympanic)   Resp 16   Wt 75.1 kg (165 lb 9.6 oz)   SpO2 97%   Breastfeeding No   BMI 27.99 kg/m    Body mass index is 27.99 kg/m .  Physical Exam   GENERAL: healthy, alert and no distress  RESP: lungs clear to auscultation - no rales, rhonchi or wheezes  CV: regular rate and rhythm, normal S1 S2, no S3 or S4, no murmur, click or rub, no peripheral edema and peripheral pulses strong  MS: Left Knee: Normal appearing without any diffuse swelling or deformity. Medial and joint line tenderness with positive crepitus. Negative anterior and posterior drawer's sign.     DATA  Labs drawn and X ray in process.          Assessment & Plan     Andree was seen today for hypertension, pelvic pain and knee pain.    Diagnoses and all orders for this visit:    Essential hypertension with goal blood pressure less than 140/90, slightly  elevated.   -     Comprehensive metabolic panel  -     Increase dose: hydrochlorothiazide (HYDRODIURIL) 25 MG tablet; Take 1 tablet (25 mg) by mouth daily  -     Refill: labetalol (NORMODYNE) 100 MG tablet; Take 2 tablets (200 mg) by mouth 2 times daily    Hyperlipidemia LDL goal <100  -     Lipid Profile  -     Comprehensive metabolic panel  -     Refill: atorvastatin (LIPITOR) 10 MG tablet; Take 1 tablet (10 mg) by mouth daily    Left knee pain, unspecified chronicity, likely due to degenerative changes  -     XR Knee Left 3 Views  -     diclofenac (VOLTAREN) 75 MG EC tablet; Take 1 tablet (75 mg) by mouth 2 times daily as needed for moderate pain (take with food)    Hip pain, right, likely due to degenerative changes  -     XR Hip Right 2-3 Views  -     diclofenac (VOLTAREN) 75 MG EC tablet; Take 1 tablet (75 mg) by mouth 2 times daily as needed for moderate pain (take with food)    DDD (degenerative disc disease), lumbar  -     diclofenac (VOLTAREN) 75 MG EC tablet; Take 1 tablet (75 mg) by mouth 2 times daily as needed for moderate pain (take with food)  -      Following with [facility], MURTAZA signed.     Colon cancer screening  -     Fecal colorectal cancer screen (FIT); Future    Encounter for screening mammogram for breast cancer  -     *MA Screening Digital Bilateral; Future    Will notify her with results.       Return in about 2 weeks (around 12/16/2020), or if symptoms worsen or fail to improve, for Annual Wellness Visit at earliest convenience..    Dona Bell MD  [facility]

## 2020-12-02 NOTE — LETTER
December 8, 2020         Andree Trujillo  [address] [address]        Ms. Trujillo       Here is a copy of the Knee X ray results we discussed over the phone.     Narrative & Impression     KNEE LEFT THREE VIEWS December 2, 2020 11:04 AM      HISTORY: Left knee pain, unspecified chronicity.                                                                      IMPRESSION:   1. Slight lateral patellar subluxation.  2. I cannot exclude one or two loose bodies in the notch region.  3. No fracture identified.  4. Small asymmetric dense area within the tibial proximal diaphysis.  This could represent a normal variant area of focal trabeculation, but  a small benign enchondroma or chronic bone infarct cannot be excluded.     Please contact me if you have any additional questions or concerns.     Sincerely,       Dona Bell M.D/noel

## 2020-12-02 NOTE — LETTER
December 3, 2020      Andree Trujillo  [address] [address]        Dear ,    We are writing to inform you of your test results.    Your labs looked good.     Cholesterol panel was normal. Continue current dose of Atorvastatin.   Complete Metabolic Panel (panel that checks liver function, kidney function and electrolytes) was normal. Glucose was slightly elevated. This means that you do not have diabetes but you could be at risk (prediabetes).   Continue efforts to eat a well balanced healthy low carb diet and exercise regularly.   Will repeat labs in a year.     Resulted Orders   Lipid Profile   Result Value Ref Range    Cholesterol 179 <200 mg/dL    Triglycerides 71 <150 mg/dL      Comment:      Fasting specimen    HDL Cholesterol 90 >49 mg/dL    LDL Cholesterol Calculated 75 <100 mg/dL      Comment:      Desirable:       <100 mg/dl    Non HDL Cholesterol 89 <130 mg/dL   Comprehensive metabolic panel   Result Value Ref Range    Sodium 138 133 - 144 mmol/L    Potassium 4.1 3.4 - 5.3 mmol/L    Chloride 103 94 - 109 mmol/L    Carbon Dioxide 30 20 - 32 mmol/L    Anion Gap 5 3 - 14 mmol/L    Glucose 100 (H) 70 - 99 mg/dL      Comment:      Fasting specimen    Urea Nitrogen 17 7 - 30 mg/dL    Creatinine 0.67 0.52 - 1.04 mg/dL    GFR Estimate >90 >60 mL/min/[1.73_m2]      Comment:      Non  GFR Calc  Starting 12/18/2018, serum creatinine based estimated GFR (eGFR) will be   calculated using the Chronic Kidney Disease Epidemiology Collaboration   (CKD-EPI) equation.      GFR Estimate If Black >90 >60 mL/min/[1.73_m2]      Comment:       GFR Calc  Starting 12/18/2018, serum creatinine based estimated GFR (eGFR) will be   calculated using the Chronic Kidney Disease Epidemiology Collaboration   (CKD-EPI) equation.      Calcium 9.2 8.5 - 10.1 mg/dL    Bilirubin Total 0.4 0.2 - 1.3 mg/dL    Albumin 4.3 3.4 - 5.0 g/dL    Protein Total 7.3 6.8 - 8.8 g/dL    Alkaline  Phosphatase 109 40 - 150 U/L    ALT 29 0 - 50 U/L    AST 17 0 - 45 U/L       If you have any questions or concerns, please call the clinic at the number listed above.       Sincerely,      Dona Bell MD/cassiao

## 2020-12-03 ENCOUNTER — RECORDS - HEALTHEAST (OUTPATIENT)
Dept: SCHEDULING | Facility: CLINIC | Age: 70
End: 2020-12-03

## 2020-12-03 DIAGNOSIS — Z12.31 VISIT FOR SCREENING MAMMOGRAM: ICD-10-CM

## 2020-12-08 DIAGNOSIS — M51.369 DDD (DEGENERATIVE DISC DISEASE), LUMBAR: ICD-10-CM

## 2020-12-08 DIAGNOSIS — G89.29 CHRONIC PAIN OF LEFT KNEE: ICD-10-CM

## 2020-12-08 DIAGNOSIS — Z12.11 COLON CANCER SCREENING: ICD-10-CM

## 2020-12-08 DIAGNOSIS — M16.11 PRIMARY OSTEOARTHRITIS OF RIGHT HIP: Primary | ICD-10-CM

## 2020-12-08 DIAGNOSIS — M25.562 CHRONIC PAIN OF LEFT KNEE: ICD-10-CM

## 2020-12-08 PROCEDURE — 82274 ASSAY TEST FOR BLOOD FECAL: CPT | Performed by: FAMILY MEDICINE

## 2020-12-10 LAB — HEMOCCULT STL QL IA: NEGATIVE

## 2020-12-14 ENCOUNTER — OFFICE VISIT (OUTPATIENT)
Dept: ORTHOPEDICS | Facility: CLINIC | Age: 70
End: 2020-12-14
Attending: FAMILY MEDICINE
Payer: COMMERCIAL

## 2020-12-14 ENCOUNTER — ANCILLARY PROCEDURE (OUTPATIENT)
Dept: GENERAL RADIOLOGY | Facility: CLINIC | Age: 70
End: 2020-12-14
Attending: ORTHOPAEDIC SURGERY
Payer: COMMERCIAL

## 2020-12-14 ENCOUNTER — TRANSFERRED RECORDS (OUTPATIENT)
Dept: HEALTH INFORMATION MANAGEMENT | Facility: CLINIC | Age: 70
End: 2020-12-14

## 2020-12-14 VITALS
WEIGHT: 169.8 LBS | HEART RATE: 65 BPM | BODY MASS INDEX: 28.29 KG/M2 | DIASTOLIC BLOOD PRESSURE: 110 MMHG | SYSTOLIC BLOOD PRESSURE: 178 MMHG | HEIGHT: 65 IN

## 2020-12-14 DIAGNOSIS — M51.369 DDD (DEGENERATIVE DISC DISEASE), LUMBAR: ICD-10-CM

## 2020-12-14 DIAGNOSIS — G89.29 CHRONIC PAIN OF LEFT KNEE: ICD-10-CM

## 2020-12-14 DIAGNOSIS — M25.562 CHRONIC PAIN OF LEFT KNEE: ICD-10-CM

## 2020-12-14 DIAGNOSIS — M16.11 PRIMARY OSTEOARTHRITIS OF RIGHT HIP: ICD-10-CM

## 2020-12-14 DIAGNOSIS — M17.12 PRIMARY OSTEOARTHRITIS OF LEFT KNEE: Primary | ICD-10-CM

## 2020-12-14 LAB
ALT SERPL-CCNC: 27 U/L (ref 0–45)
AST SERPL-CCNC: 23 U/L (ref 0–40)
CREAT SERPL-MCNC: 0.73 MG/DL (ref 0.6–1.1)
GFR SERPL CREATININE-BSD FRML MDRD: >60 ML/MIN/1.73M2
GLUCOSE SERPL-MCNC: 90 MG/DL (ref 70–125)
POTASSIUM SERPL-SCNC: 3.7 MMOL/L (ref 3.5–5)

## 2020-12-14 PROCEDURE — 20610 DRAIN/INJ JOINT/BURSA W/O US: CPT | Mod: LT | Performed by: ORTHOPAEDIC SURGERY

## 2020-12-14 PROCEDURE — 99203 OFFICE O/P NEW LOW 30 MIN: CPT | Mod: 25 | Performed by: ORTHOPAEDIC SURGERY

## 2020-12-14 PROCEDURE — 72170 X-RAY EXAM OF PELVIS: CPT | Performed by: RADIOLOGY

## 2020-12-14 RX ADMIN — METHYLPREDNISOLONE ACETATE 80 MG: 80 INJECTION, SUSPENSION INTRA-ARTICULAR; INTRALESIONAL; INTRAMUSCULAR; SOFT TISSUE at 14:10

## 2020-12-14 RX ADMIN — BUPIVACAINE HYDROCHLORIDE 4 ML: 2.5 INJECTION, SOLUTION INFILTRATION; PERINEURAL at 14:10

## 2020-12-14 ASSESSMENT — MIFFLIN-ST. JEOR: SCORE: 1293.15

## 2020-12-14 ASSESSMENT — PAIN SCALES - GENERAL: PAINLEVEL: NO PAIN (1)

## 2020-12-14 NOTE — PROGRESS NOTES
"Chief Complaint:   Chief Complaint   Patient presents with     Right Hip - Pain     Onset: about 8 months. She does have a hx of LBP, notes she has a herniated disc. NKI to hip. Pain is the groin area, buttocks and will radiate down the leg both lateral and posterior. When she lays on her right side it hurts. Denies any N/T in leg.      Hip Pain     She has had injections in her back. No injections into hip. She did PT for her back but she thinks that made everything worse. Walking cause worse pain.     Andree Trujillo is seen today in the [facility] for evaluation of right hip pain at the request of Dr. Dona Bell        HISTORY OF PRESENT ILLNESS    Andree Trujillo is a 70 year old female seen for evaluation of ongoing right hip pain with no known injury.   Pain has been present for 8 months. Pain is aggravated with walking, laying on right side.  Locates pain to the groin area, buttocks and will radiate down the leg both along the side and back as far as the knee. Both lower legs \"ache\", especially at night. Pain with laying on right side. Pain with donning shoes and socks, in/out of bed, but ok in/out of car. Has history of low back pain, notes she has a herniated disc as well. She's had injections in her low back but never into the hip. She's done Physical Therapy for her low back but thinks actually made things worse. No treatments for the hip.    Has low back pain sitting at rest.    Has left knee pain for 4-5 months. Thinks twisted it. Pain mostly along the inner aspect. Pain with walking, bending, twisting. No treatments.    Denies numbness and tingling. Has had some dizziness when getting up the past couple of days, not sure if related to gabapentin usage or not.    Present symptoms: pain medially , laterally, anteriorly and posteriorly, pain dull/achy , mild pain.    Pain severity: 1/10  Pain quality: aching  Frequency of symptoms: are constant  Exacerbating Factors: " weight bearing, [id number], donning shoes/socks  Relieving Factors: rest, positional changes  Night Pain: Yes  Pain while at rest: Yes   Associated numbness or tingling: No     Orthopedic PMH: chronic low back pain.    Other PMH:  has a past medical history of Allergies, Anxiety, Cyst of breast, Osteopenia, and Right knee pain.  Patient Active Problem List   Diagnosis     Osteopenia     Allergic state     CARDIOVASCULAR SCREENING; LDL GOAL LESS THAN 100     Advanced directives, counseling/discussion     Hypercholesterolemia     Hypertension goal BP (blood pressure) < 140/90     Achilles tendonitis, bilateral     Bilateral impacted cerumen     Vitamin D deficiency       Surgical Hx:  has a past surgical history that includes hysterectomy, pap no longer indicated (2009).    Medications:   Current Outpatient Medications:      ASPIRIN 81 MG OR TABS, 1 TABLET DAILY, Disp: , Rfl:      atorvastatin (LIPITOR) 10 MG tablet, Take 1 tablet (10 mg) by mouth daily, Disp: 90 tablet, Rfl: 3     cyclobenzaprine (FLEXERIL) 5 MG tablet, Take 1 tablet (5 mg) by mouth 3 times daily as needed for muscle spasms (Patient not taking: Reported on 12/2/2020), Disp: 42 tablet, Rfl: 0     diclofenac (VOLTAREN) 75 MG EC tablet, Take 1 tablet (75 mg) by mouth 2 times daily as needed for moderate pain (take with food), Disp: 30 tablet, Rfl: 1     gabapentin (NEURONTIN) 300 MG capsule, TAKE 2 CAPSULES BY MOUTH THREE TIMES DAILY, Disp: , Rfl:      hydrochlorothiazide (HYDRODIURIL) 25 MG tablet, Take 1 tablet (25 mg) by mouth daily, Disp: 90 tablet, Rfl: 3     labetalol (NORMODYNE) 100 MG tablet, Take 2 tablets (200 mg) by mouth 2 times daily, Disp: 360 tablet, Rfl: 3     Multiple Vitamin (DAILY MULTIVITAMIN PO), Take  by mouth., Disp: , Rfl:      Naproxen Sodium (ALEVE PO), Take by mouth 2 times daily (with meals), Disp: , Rfl:      pramipexole (MIRAPEX) 0.125 MG tablet, TAKE 1 TABLET BY MOUTH AT BEDTIME, Disp: 90 tablet, Rfl: 0    Allergies:  "  Allergies   Allergen Reactions     Erythromycin Rash     Penicillins Rash       Social Hx: retired.  reports that she quit smoking about 29 years ago. She has never used smokeless tobacco. She reports current alcohol use. She reports that she does not use drugs.    Family Hx: family history includes Alcohol/Drug in her brother and father; Allergies in her brother; Arthritis in her father and mother; Breast Cancer in her mother; Cancer in her maternal grandfather and maternal grandmother; Cerebrovascular Disease in her maternal grandmother; Diabetes in her paternal aunt; Hypertension in her brother, father, and paternal grandfather; Prostate Cancer in her brother; Uterine Cancer in her sister.    REVIEW OF SYSTEMS:  10 point ROS neg other than the symptoms noted above in the HPI and PAST MEDICAL HISTORY. Notables,  CONSTITUTIONAL:NEGATIVE for fever, chills, change in weight  INTEGUMENTARY/SKIN: NEGATIVE for worrisome rashes, moles or lesions  MUSCULOSKELETAL:See HPI above  NEURO: NEGATIVE for weakness, dizziness or paresthesias    PHYSICAL EXAM:  BP (!) 175/112   Pulse 65   Ht 1.638 m (5' 4.5\")   Wt 77 kg (169 lb 12.8 oz)   BMI 28.70 kg/m     GENERAL APPEARANCE: healthy, alert, no distress  SKIN: no suspicious lesions or rashes  NEURO: Normal strength and tone, mentation intact and speech normal  PSYCH:  mentation appears normal and affect normal  RESPIRATORY: No increased work of breathing.  LYMPH: no palpable inguinal lymph nodes.    BILATERAL LOWER EXTREMITIES:  Gait: favors the right. Hunched.  slight varus alignment.  No gross deformities or masses.  mild Quad atrophy, strength weak  Intact sensation deep peroneal nerve, superficial peroneal nerve, med/lat tibial nerve, sural nerve, saphenous nerve  Intact EHL, EDL, TA, FHL, GS, quadriceps hamstrings and hip flexors  Toes warm and well perfused, brisk capillary refill. Palpable 2+ dp pulses.  Bilateral calf soft and nttp or squeeze.  DTRs: achilles 2+, " "patella 2+.  Edema: trace  Leg lengths: grossly symmetric at medial malleolus.      BACK EXAM  Lumbar range of motion: flexion to touch toes causes \"pulling\" in the right buttock; extension adequate and actually feels good, side bend: to the left causes right low back pain.  Squat: negative  Seated SLR: negative , bilateral.  Supine SLR: negative , bilateral.  Sciatic Notch tenderness: negative    LEFT HIP EXAM:      Palpation: Tender:   Mid and left low back.   Nontender: left greater trochanter, left gluteus medius  Strength:  4/5  Special tests:  Irritability (flexion/adduction/internal rotation) negative.  Hip range of motion: Internal rotation: 10, External rotation: 50, Flexion 100, all pain free      RIGHT HIP EXAM:    Palpation: Tender:   right greater trochanter, right gluteus medius, right SI joint, right low back   Nontender: right groin  Strength:  4/5  Special tests:  Irritability (flexion/adduction/internal rotation) positive.  Hip range of motion: Internal rotation: 5, External rotation: 45, Flexion 95, pain with extremes of rotation    LEFT KNEE  Range of motion: full extension, 130 degrees flexion with medial and anterior discomfort  Tender to palpation medial joint line  Nontender to palpation lateral joint line, anterior knee  Positive patello-femoral crepitus and grind test  Effusion: none    RIGHT KNEE  Range of motion: full extension, 135 degrees flexion  Nontender to palpation  Positive patello-femoral crepitus and grind  No effusion.          X-RAY: AP pelvis 12/14/2020 and AP/Lateral views right hip from 12/2/2020 were reviewed in clinic today. No obvious fractures or dislocations. Moderate right hip degenerative changes. Pincer deformity of acetabulum. Femoral and acetabular osteophytes. Low lumbar degenerative changes.    3 views left knee 12/14/2020 reviewed, No obvious fracture or dislocation. No obvious bony abnormality or lesion. Moderate medial compartment and patello-femoral " "narrowing. Preserved lateral joint space.        Impression: 71yo female with:  1)  chronic right hip pain, primary osteoarthritis, trochanteric bursitis  2) chronic left knee pain, primary osteoarthritis  3) chronic low back pain.    Plan:   HIP  * xrays with osteoarthritis, right more than left, but not severe  * typically causes pain to the groin  * pain in buttock, low back and down the thigh/leg likely radicular from low back pain  * also some trochanteric pain as well  * discussed Physical Therapy, home exercise program, intra-articular cortisone hip injection   * will refer to Sports Medicine for right hip intra-articular cortisone injection    KNEE  * reviewed imaging studies with patient, showing arthritic changes, or wearing of the cartilage in the knee. This can be caused by normal \"wear and tear\" over the years or following prior injury to the knee.    Non-surgical treatment for knee arthritis includes:    * rest, sitting  * Activity modification - avoid impact activities or activities that aggravate symptoms.  * NSAIDS (non-steroidal anti-inflammatory medications; e.g. Aleve, advil, motrin, ibuprofen) - regular use for inflammation ( twice daily or three times daily), with food, as long as no contra-indications Please discuss with primary care doctor if needed  * ice, 15-20 minutes at a time several times a day or as needed.  * Strengthening of quadriceps muscles  * Physical Therapy for strengthening, stretching and range of motion exercises of legs  * Tylenol as needed for pain, consider Tylenol arthritis or similar  * Weight loss: Weight loss:  Body mass index is 28.7 kg/m .. weight loss benefits, not only for the current pain symptoms, but also overall health. Recommend a good diet plan that works for the patient, with the assistance of a dietician or primary care doctor as needed. Also, a good, low-impact exercise program for at least 20 minutes per day, 3 times per week, such as exercise bike, " "elliptical , or pool.  * Exercise: low impact such as stationary bike, elliptical, pool.  * Injections: cortisone versus viscosupplementation (hyaluronic acid, \"rooster comb\", \"gel shots\"); risks and perceived benefits discussed today. Patient elects  to proceed.  * Bracing: bracing the knee may offer some relief of symptoms when worn and provide some stability.  * over the counter supplements such as glucosamine and chondroitin sulfate may help with joint pain.  * topical ointments may help as well    * return to clinic as needed.      Andree to follow up with Primary Care provider regarding elevated blood pressure.        Harley Vergara M.D., M.S.  Dept. of Orthopaedic Surgery  [facility]      Large Joint Injection/Arthocentesis: L knee joint    Date/Time: 12/14/2020 2:10 PM  Performed by: Sin Navarrete PA  Authorized by: Harley Vergara MD     Indications:  Pain and osteoarthritis  Needle Size:  22 G  Guidance: landmark guided    Approach:  Anteromedial  Location:  Knee      Medications:  80 mg methylPREDNISolone 80 MG/ML; 4 mL bupivacaine 0.25 %  Outcome:  Tolerated well, no immediate complications  Procedure discussed: discussed risks, benefits, and alternatives    Consent Given by:  Patient  Prep: patient was prepped and draped in usual sterile fashion            "

## 2020-12-14 NOTE — LETTER
"    12/14/2020         RE: Andree Trujillo  [address] [facility] [address]        Dear Colleague,    Thank you for referring your patient, Andree Trujillo, to the [facility] [place]. Please see a copy of my visit note below.    Chief Complaint:   Chief Complaint   Patient presents with     Right Hip - Pain     Onset: about 8 months. She does have a hx of LBP, notes she has a herniated disc. NKI to hip. Pain is the groin area, buttocks and will radiate down the leg both lateral and posterior. When she lays on her right side it hurts. Denies any N/T in leg.      Hip Pain     She has had injections in her back. No injections into hip. She did PT for her back but she thinks that made everything worse. Walking cause worse pain.     Andree Trujillo is seen today in the [facility] for evaluation of right hip pain at the request of Dr. Dona Bell        HISTORY OF PRESENT ILLNESS    Andree Trujillo is a 70 year old female seen for evaluation of ongoing right hip pain with no known injury.   Pain has been present for 8 months. Pain is aggravated with walking, laying on right side.  Locates pain to the groin area, buttocks and will radiate down the leg both along the side and back as far as the knee. Both lower legs \"ache\", especially at night. Pain with laying on right side. Pain with donning shoes and socks, in/out of bed, but ok in/out of car. Has history of low back pain, notes she has a herniated disc as well. She's had injections in her low back but never into the hip. She's done Physical Therapy for her low back but thinks actually made things worse. No treatments for the hip.    Has low back pain sitting at rest.    Has left knee pain for 4-5 months. Thinks twisted it. Pain mostly along the inner aspect. Pain with walking, bending, twisting. No treatments.    Denies numbness and tingling. Has had some dizziness when getting up the past couple of days, not " sure if related to gabapentin usage or not.    Present symptoms: pain medially , laterally, anteriorly and posteriorly, pain dull/achy , mild pain.    Pain severity: 1/10  Pain quality: aching  Frequency of symptoms: are constant  Exacerbating Factors: weight bearing, [id number], donning shoes/socks  Relieving Factors: rest, positional changes  Night Pain: Yes  Pain while at rest: Yes   Associated numbness or tingling: No     Orthopedic PMH: chronic low back pain.    Other PMH:  has a past medical history of Allergies, Anxiety, Cyst of breast, Osteopenia, and Right knee pain.  Patient Active Problem List   Diagnosis     Osteopenia     Allergic state     CARDIOVASCULAR SCREENING; LDL GOAL LESS THAN 100     Advanced directives, counseling/discussion     Hypercholesterolemia     Hypertension goal BP (blood pressure) < 140/90     Achilles tendonitis, bilateral     Bilateral impacted cerumen     Vitamin D deficiency       Surgical Hx:  has a past surgical history that includes hysterectomy, pap no longer indicated (2009).    Medications:   Current Outpatient Medications:      ASPIRIN 81 MG OR TABS, 1 TABLET DAILY, Disp: , Rfl:      atorvastatin (LIPITOR) 10 MG tablet, Take 1 tablet (10 mg) by mouth daily, Disp: 90 tablet, Rfl: 3     cyclobenzaprine (FLEXERIL) 5 MG tablet, Take 1 tablet (5 mg) by mouth 3 times daily as needed for muscle spasms (Patient not taking: Reported on 12/2/2020), Disp: 42 tablet, Rfl: 0     diclofenac (VOLTAREN) 75 MG EC tablet, Take 1 tablet (75 mg) by mouth 2 times daily as needed for moderate pain (take with food), Disp: 30 tablet, Rfl: 1     gabapentin (NEURONTIN) 300 MG capsule, TAKE 2 CAPSULES BY MOUTH THREE TIMES DAILY, Disp: , Rfl:      hydrochlorothiazide (HYDRODIURIL) 25 MG tablet, Take 1 tablet (25 mg) by mouth daily, Disp: 90 tablet, Rfl: 3     labetalol (NORMODYNE) 100 MG tablet, Take 2 tablets (200 mg) by mouth 2 times daily, Disp: 360 tablet, Rfl: 3     Multiple Vitamin (DAILY  "MULTIVITAMIN PO), Take  by mouth., Disp: , Rfl:      Naproxen Sodium (ALEVE PO), Take by mouth 2 times daily (with meals), Disp: , Rfl:      pramipexole (MIRAPEX) 0.125 MG tablet, TAKE 1 TABLET BY MOUTH AT BEDTIME, Disp: 90 tablet, Rfl: 0    Allergies:   Allergies   Allergen Reactions     Erythromycin Rash     Penicillins Rash       Social Hx: retired.  reports that she quit smoking about 29 years ago. She has never used smokeless tobacco. She reports current alcohol use. She reports that she does not use drugs.    Family Hx: family history includes Alcohol/Drug in her brother and father; Allergies in her brother; Arthritis in her father and mother; Breast Cancer in her mother; Cancer in her maternal grandfather and maternal grandmother; Cerebrovascular Disease in her maternal grandmother; Diabetes in her paternal aunt; Hypertension in her brother, father, and paternal grandfather; Prostate Cancer in her brother; Uterine Cancer in her sister.    REVIEW OF SYSTEMS:  10 point ROS neg other than the symptoms noted above in the HPI and PAST MEDICAL HISTORY. Notables,  CONSTITUTIONAL:NEGATIVE for fever, chills, change in weight  INTEGUMENTARY/SKIN: NEGATIVE for worrisome rashes, moles or lesions  MUSCULOSKELETAL:See HPI above  NEURO: NEGATIVE for weakness, dizziness or paresthesias    PHYSICAL EXAM:  BP (!) 175/112   Pulse 65   Ht 1.638 m (5' 4.5\")   Wt 77 kg (169 lb 12.8 oz)   BMI 28.70 kg/m     GENERAL APPEARANCE: healthy, alert, no distress  SKIN: no suspicious lesions or rashes  NEURO: Normal strength and tone, mentation intact and speech normal  PSYCH:  mentation appears normal and affect normal  RESPIRATORY: No increased work of breathing.  LYMPH: no palpable inguinal lymph nodes.    BILATERAL LOWER EXTREMITIES:  Gait: favors the right. Hunched.  slight varus alignment.  No gross deformities or masses.  mild Quad atrophy, strength weak  Intact sensation deep peroneal nerve, superficial peroneal nerve, med/lat " "tibial nerve, sural nerve, saphenous nerve  Intact EHL, EDL, TA, FHL, GS, quadriceps hamstrings and hip flexors  Toes warm and well perfused, brisk capillary refill. Palpable 2+ dp pulses.  Bilateral calf soft and nttp or squeeze.  DTRs: achilles 2+, patella 2+.  Edema: trace  Leg lengths: grossly symmetric at medial malleolus.      BACK EXAM  Lumbar range of motion: flexion to touch toes causes \"pulling\" in the right buttock; extension adequate and actually feels good, side bend: to the left causes right low back pain.  Squat: negative  Seated SLR: negative , bilateral.  Supine SLR: negative , bilateral.  Sciatic Notch tenderness: negative    LEFT HIP EXAM:      Palpation: Tender:   Mid and left low back.   Nontender: left greater trochanter, left gluteus medius  Strength:  4/5  Special tests:  Irritability (flexion/adduction/internal rotation) negative.  Hip range of motion: Internal rotation: 10, External rotation: 50, Flexion 100, all pain free      RIGHT HIP EXAM:    Palpation: Tender:   right greater trochanter, right gluteus medius, right SI joint, right low back   Nontender: right groin  Strength:  4/5  Special tests:  Irritability (flexion/adduction/internal rotation) positive.  Hip range of motion: Internal rotation: 5, External rotation: 45, Flexion 95, pain with extremes of rotation    LEFT KNEE  Range of motion: full extension, 130 degrees flexion with medial and anterior discomfort  Tender to palpation medial joint line  Nontender to palpation lateral joint line, anterior knee  Positive patello-femoral crepitus and grind test  Effusion: none    RIGHT KNEE  Range of motion: full extension, 135 degrees flexion  Nontender to palpation  Positive patello-femoral crepitus and grind  No effusion.          X-RAY: AP pelvis 12/14/2020 and AP/Lateral views right hip from 12/2/2020 were reviewed in clinic today. No obvious fractures or dislocations. Moderate right hip degenerative changes. Pincer deformity of " "acetabulum. Femoral and acetabular osteophytes. Low lumbar degenerative changes.    3 views left knee 12/14/2020 reviewed, No obvious fracture or dislocation. No obvious bony abnormality or lesion. Moderate medial compartment and patello-femoral narrowing. Preserved lateral joint space.        Impression: 71yo female with:  1)  chronic right hip pain, primary osteoarthritis, trochanteric bursitis  2) chronic left knee pain, primary osteoarthritis  3) chronic low back pain.    Plan:   HIP  * xrays with osteoarthritis, right more than left, but not severe  * typically causes pain to the groin  * pain in buttock, low back and down the thigh/leg likely radicular from low back pain  * also some trochanteric pain as well  * discussed Physical Therapy, home exercise program, intra-articular cortisone hip injection   * will refer to Sports Medicine for right hip intra-articular cortisone injection    KNEE  * reviewed imaging studies with patient, showing arthritic changes, or wearing of the cartilage in the knee. This can be caused by normal \"wear and tear\" over the years or following prior injury to the knee.    Non-surgical treatment for knee arthritis includes:    * rest, sitting  * Activity modification - avoid impact activities or activities that aggravate symptoms.  * NSAIDS (non-steroidal anti-inflammatory medications; e.g. Aleve, advil, motrin, ibuprofen) - regular use for inflammation ( twice daily or three times daily), with food, as long as no contra-indications Please discuss with primary care doctor if needed  * ice, 15-20 minutes at a time several times a day or as needed.  * Strengthening of quadriceps muscles  * Physical Therapy for strengthening, stretching and range of motion exercises of legs  * Tylenol as needed for pain, consider Tylenol arthritis or similar  * Weight loss: Weight loss:  Body mass index is 28.7 kg/m .. weight loss benefits, not only for the current pain symptoms, but also overall " "health. Recommend a good diet plan that works for the patient, with the assistance of a dietician or primary care doctor as needed. Also, a good, low-impact exercise program for at least 20 minutes per day, 3 times per week, such as exercise bike, elliptical , or pool.  * Exercise: low impact such as stationary bike, elliptical, pool.  * Injections: cortisone versus viscosupplementation (hyaluronic acid, \"rooster comb\", \"gel shots\"); risks and perceived benefits discussed today. Patient elects  to proceed.  * Bracing: bracing the knee may offer some relief of symptoms when worn and provide some stability.  * over the counter supplements such as glucosamine and chondroitin sulfate may help with joint pain.  * topical ointments may help as well    * return to clinic as needed.      Andree to follow up with Primary Care provider regarding elevated blood pressure.        Harley Vergara M.D., M.S.  Dept. of Orthopaedic Surgery  [facility]      Large Joint Injection/Arthocentesis: L knee joint    Date/Time: 12/14/2020 2:10 PM  Performed by: Sin Navarrete PA  Authorized by: Harley Vergara MD     Indications:  Pain and osteoarthritis  Needle Size:  22 G  Guidance: landmark guided    Approach:  Anteromedial  Location:  Knee      Medications:  80 mg methylPREDNISolone 80 MG/ML; 4 mL bupivacaine 0.25 %  Outcome:  Tolerated well, no immediate complications  Procedure discussed: discussed risks, benefits, and alternatives    Consent Given by:  Patient  Prep: patient was prepped and draped in usual sterile fashion                Again, thank you for allowing me to participate in the care of your patient.        Sincerely,        Harley Vergara MD    "

## 2020-12-15 ENCOUNTER — TELEPHONE (OUTPATIENT)
Dept: ORTHOPEDICS | Facility: CLINIC | Age: 70
End: 2020-12-15

## 2020-12-15 RX ORDER — METHYLPREDNISOLONE ACETATE 80 MG/ML
80 INJECTION, SUSPENSION INTRA-ARTICULAR; INTRALESIONAL; INTRAMUSCULAR; SOFT TISSUE
Status: DISCONTINUED | OUTPATIENT
Start: 2020-12-14 | End: 2022-03-11

## 2020-12-15 RX ORDER — BUPIVACAINE HYDROCHLORIDE 2.5 MG/ML
4 INJECTION, SOLUTION INFILTRATION; PERINEURAL
Status: DISCONTINUED | OUTPATIENT
Start: 2020-12-14 | End: 2022-03-11

## 2020-12-15 NOTE — TELEPHONE ENCOUNTER
I spoke to Andree to see how she was doing and how her evening went after she left our clinic and was advised to go to the ED for hypertension. She went to the ED and had a pretty uneventful course. They monitored her blood pressure, which continued to be very high, but did not attempt to make any medicinal interventions. She was sent home and advised to f/u with her PCP. She was going to make that appointment after we got off the phone. She appreciated me reaching out to her.    Sin Navarrete PA-C, [facility] (Ortho)  Supervising Physician: Harley Vergara M.D., M.S.  Dept. of Orthopaedic Surgery  [facility]

## 2020-12-21 ENCOUNTER — OFFICE VISIT - HEALTHEAST (OUTPATIENT)
Dept: INTERNAL MEDICINE | Facility: CLINIC | Age: 70
End: 2020-12-21

## 2020-12-21 DIAGNOSIS — M77.8 TENDINITIS OF BOTH FEET: ICD-10-CM

## 2020-12-21 DIAGNOSIS — M48.062 SPINAL STENOSIS OF LUMBAR REGION WITH NEUROGENIC CLAUDICATION: ICD-10-CM

## 2020-12-21 DIAGNOSIS — M25.551 RIGHT HIP PAIN: ICD-10-CM

## 2020-12-21 DIAGNOSIS — M47.26 OSTEOARTHRITIS OF SPINE WITH RADICULOPATHY, LUMBAR REGION: ICD-10-CM

## 2020-12-21 DIAGNOSIS — I10 ESSENTIAL HYPERTENSION: ICD-10-CM

## 2020-12-21 DIAGNOSIS — G89.29 CHRONIC RIGHT-SIDED LOW BACK PAIN WITH RIGHT-SIDED SCIATICA: ICD-10-CM

## 2020-12-21 DIAGNOSIS — M54.41 CHRONIC RIGHT-SIDED LOW BACK PAIN WITH RIGHT-SIDED SCIATICA: ICD-10-CM

## 2020-12-21 ASSESSMENT — ANXIETY QUESTIONNAIRES
3. WORRYING TOO MUCH ABOUT DIFFERENT THINGS: SEVERAL DAYS
5. BEING SO RESTLESS THAT IT IS HARD TO SIT STILL: NOT AT ALL
6. BECOMING EASILY ANNOYED OR IRRITABLE: NOT AT ALL
1. FEELING NERVOUS, ANXIOUS, OR ON EDGE: MORE THAN HALF THE DAYS
IF YOU CHECKED OFF ANY PROBLEMS ON THIS QUESTIONNAIRE, HOW DIFFICULT HAVE THESE PROBLEMS MADE IT FOR YOU TO DO YOUR WORK, TAKE CARE OF THINGS AT HOME, OR GET ALONG WITH OTHER PEOPLE: NOT DIFFICULT AT ALL
2. NOT BEING ABLE TO STOP OR CONTROL WORRYING: MORE THAN HALF THE DAYS
GAD7 TOTAL SCORE: 7
7. FEELING AFRAID AS IF SOMETHING AWFUL MIGHT HAPPEN: NOT AT ALL
4. TROUBLE RELAXING: MORE THAN HALF THE DAYS

## 2020-12-21 ASSESSMENT — PATIENT HEALTH QUESTIONNAIRE - PHQ9: SUM OF ALL RESPONSES TO PHQ QUESTIONS 1-9: 10

## 2021-01-06 ENCOUNTER — OFFICE VISIT (OUTPATIENT)
Dept: ORTHOPEDICS | Facility: CLINIC | Age: 71
End: 2021-01-06
Attending: ORTHOPAEDIC SURGERY
Payer: COMMERCIAL

## 2021-01-06 VITALS — BODY MASS INDEX: 28.16 KG/M2 | WEIGHT: 169 LBS | HEIGHT: 65 IN

## 2021-01-06 DIAGNOSIS — M16.11 PRIMARY OSTEOARTHRITIS OF RIGHT HIP: ICD-10-CM

## 2021-01-06 PROCEDURE — 20611 DRAIN/INJ JOINT/BURSA W/US: CPT | Mod: RT | Performed by: FAMILY MEDICINE

## 2021-01-06 RX ORDER — TRIAMCINOLONE ACETONIDE 40 MG/ML
40 INJECTION, SUSPENSION INTRA-ARTICULAR; INTRAMUSCULAR
Status: DISCONTINUED | OUTPATIENT
Start: 2021-01-06 | End: 2022-03-11

## 2021-01-06 RX ORDER — ROPIVACAINE HYDROCHLORIDE 5 MG/ML
3 INJECTION, SOLUTION EPIDURAL; INFILTRATION; PERINEURAL
Status: DISCONTINUED | OUTPATIENT
Start: 2021-01-06 | End: 2022-03-11

## 2021-01-06 RX ADMIN — ROPIVACAINE HYDROCHLORIDE 3 ML: 5 INJECTION, SOLUTION EPIDURAL; INFILTRATION; PERINEURAL at 15:30

## 2021-01-06 RX ADMIN — TRIAMCINOLONE ACETONIDE 40 MG: 40 INJECTION, SUSPENSION INTRA-ARTICULAR; INTRAMUSCULAR at 15:30

## 2021-01-06 ASSESSMENT — MIFFLIN-ST. JEOR: SCORE: 1289.52

## 2021-01-06 NOTE — LETTER
2021         RE: Andree Trujillo  [address] [facility] [address]        Dear Colleague,    Thank you for referring your patient, Andree Trujillo, to the [facility] [place]. Please see a copy of my visit note below.    Andree Trujillo  :  1950  DOS: 2021  MRN: [id number]    Sports Medicine Clinic Procedure    Ultrasound Guided Right Intra-Articular Hip Injection    Clinical History: Chronic right hip and radiating low back pain over the past 8+ months.  He has minimal relief with lumbar SAI.      Diagnosis:   1. Primary osteoarthritis of right hip      Referring Physician: Harley Vergara MD  Large Joint Injection/Arthocentesis: R hip joint    Date/Time: 2021 3:30 PM  Performed by: Leroy Peacock DO  Authorized by: Leroy Peacock DO     Indications:  Pain, diagnostic evaluation and osteoarthritis  Needle Size:  22 G  Guidance: ultrasound    Approach:  Anterior  Location:  Hip      Site:  R hip joint  Medications:  3 mL ropivacaine 5 MG/ML; 40 mg triamcinolone 40 MG/ML  Outcome:  Tolerated well, no immediate complications  Procedure discussed: discussed risks, benefits, and alternatives    Consent Given by:  Patient  Timeout: timeout called immediately prior to procedure    Prep: patient was prepped and draped in usual sterile fashion     4 ml's of 1% lidocaine was used as local anesthetic prior to injection      Impression:  Successful Right intra-articular hip injection.    Plan:  Follow up as directed by Dr Vergara  Expectations and goals of CSI reviewed  Often 2-3 days for steroid effect, and can take up to two weeks for maximum effect  We discussed modified progressive pain-free activity as tolerated  Do not overuse in first two weeks if feeling better due to concern for vulnerability while steroid is working  Supportive care reviewed  All questions were answered today  Contact us with additional questions or concerns  Signs and sx of concern  reviewed      Leroy Peacock DO, MANI  Primary Care Sports Medicine  [facility]         Again, thank you for allowing me to participate in the care of your patient.        Sincerely,        Leroy Peacock DO

## 2021-01-06 NOTE — PROGRESS NOTES
Andree PICKENS Ronnie  :  1950  DOS: 2021  MRN: [id number]    Sports Medicine Clinic Procedure    Ultrasound Guided Right Intra-Articular Hip Injection    Clinical History: Chronic right hip and radiating low back pain over the past 8+ months.  He has minimal relief with lumbar SAI.      Diagnosis:   1. Primary osteoarthritis of right hip      Referring Physician: Harley Vergara MD  Large Joint Injection/Arthocentesis: R hip joint    Date/Time: 2021 3:30 PM  Performed by: Leroy Peacock DO  Authorized by: Leroy Peacock DO     Indications:  Pain, diagnostic evaluation and osteoarthritis  Needle Size:  22 G  Guidance: ultrasound    Approach:  Anterior  Location:  Hip      Site:  R hip joint  Medications:  3 mL ropivacaine 5 MG/ML; 40 mg triamcinolone 40 MG/ML  Outcome:  Tolerated well, no immediate complications  Procedure discussed: discussed risks, benefits, and alternatives    Consent Given by:  Patient  Timeout: timeout called immediately prior to procedure    Prep: patient was prepped and draped in usual sterile fashion     4 ml's of 1% lidocaine was used as local anesthetic prior to injection      Impression:  Successful Right intra-articular hip injection.    Plan:  Follow up as directed by Dr Vergara  Expectations and goals of CSI reviewed  Often 2-3 days for steroid effect, and can take up to two weeks for maximum effect  We discussed modified progressive pain-free activity as tolerated  Do not overuse in first two weeks if feeling better due to concern for vulnerability while steroid is working  Supportive care reviewed  All questions were answered today  Contact us with additional questions or concerns  Signs and sx of concern reviewed      Leroy Peacock DO, CAQ  Primary Care Sports Medicine  [facility]

## 2021-01-25 ENCOUNTER — OFFICE VISIT - HEALTHEAST (OUTPATIENT)
Dept: INTERNAL MEDICINE | Facility: CLINIC | Age: 71
End: 2021-01-25

## 2021-01-25 DIAGNOSIS — I10 ESSENTIAL HYPERTENSION: ICD-10-CM

## 2021-01-25 DIAGNOSIS — E78.5 HYPERLIPIDEMIA LDL GOAL <130: ICD-10-CM

## 2021-01-25 DIAGNOSIS — Z23 NEED FOR INFLUENZA VACCINATION: ICD-10-CM

## 2021-01-25 DIAGNOSIS — R42 DIZZINESS: ICD-10-CM

## 2021-01-25 DIAGNOSIS — Z12.11 SCREEN FOR COLON CANCER: ICD-10-CM

## 2021-01-25 LAB
ANION GAP SERPL CALCULATED.3IONS-SCNC: 12 MMOL/L (ref 5–18)
BUN SERPL-MCNC: 16 MG/DL (ref 8–22)
CALCIUM SERPL-MCNC: 9.2 MG/DL (ref 8.5–10.5)
CHLORIDE BLD-SCNC: 96 MMOL/L (ref 98–107)
CO2 SERPL-SCNC: 27 MMOL/L (ref 22–31)
CREAT SERPL-MCNC: 0.67 MG/DL (ref 0.6–1.1)
GFR SERPL CREATININE-BSD FRML MDRD: >60 ML/MIN/1.73M2
GLUCOSE BLD-MCNC: 94 MG/DL (ref 70–125)
POTASSIUM BLD-SCNC: 3.8 MMOL/L (ref 3.5–5)
SODIUM SERPL-SCNC: 135 MMOL/L (ref 136–145)

## 2021-02-15 DIAGNOSIS — G25.81 RESTLESS LEG SYNDROME: ICD-10-CM

## 2021-02-17 RX ORDER — PRAMIPEXOLE DIHYDROCHLORIDE 0.12 MG/1
TABLET ORAL
Qty: 90 TABLET | Refills: 0 | OUTPATIENT
Start: 2021-02-17

## 2021-02-22 ENCOUNTER — COMMUNICATION - HEALTHEAST (OUTPATIENT)
Dept: INTERNAL MEDICINE | Facility: CLINIC | Age: 71
End: 2021-02-22

## 2021-02-22 DIAGNOSIS — G25.81 RESTLESS LEGS SYNDROME: ICD-10-CM

## 2021-02-22 DIAGNOSIS — I10 ESSENTIAL HYPERTENSION: ICD-10-CM

## 2021-02-24 ENCOUNTER — AMBULATORY - HEALTHEAST (OUTPATIENT)
Dept: NURSING | Facility: CLINIC | Age: 71
End: 2021-02-24

## 2021-02-24 ENCOUNTER — OFFICE VISIT - HEALTHEAST (OUTPATIENT)
Dept: INTERNAL MEDICINE | Facility: CLINIC | Age: 71
End: 2021-02-24

## 2021-02-24 DIAGNOSIS — R42 DIZZINESS: ICD-10-CM

## 2021-02-24 DIAGNOSIS — G89.29 CHRONIC RIGHT-SIDED LOW BACK PAIN WITH RIGHT-SIDED SCIATICA: ICD-10-CM

## 2021-02-24 DIAGNOSIS — G25.81 RESTLESS LEGS SYNDROME: ICD-10-CM

## 2021-02-24 DIAGNOSIS — R55 PRE-SYNCOPE: ICD-10-CM

## 2021-02-24 DIAGNOSIS — R07.89 CHEST PRESSURE: ICD-10-CM

## 2021-02-24 DIAGNOSIS — M54.41 CHRONIC RIGHT-SIDED LOW BACK PAIN WITH RIGHT-SIDED SCIATICA: ICD-10-CM

## 2021-02-24 DIAGNOSIS — I10 ESSENTIAL HYPERTENSION: ICD-10-CM

## 2021-02-24 LAB
ATRIAL RATE - MUSE: 69 BPM
DIASTOLIC BLOOD PRESSURE - MUSE: NORMAL
INTERPRETATION ECG - MUSE: NORMAL
P AXIS - MUSE: 72 DEGREES
PR INTERVAL - MUSE: 140 MS
QRS DURATION - MUSE: 80 MS
QT - MUSE: 394 MS
QTC - MUSE: 422 MS
R AXIS - MUSE: 13 DEGREES
SYSTOLIC BLOOD PRESSURE - MUSE: NORMAL
T AXIS - MUSE: 41 DEGREES
VENTRICULAR RATE- MUSE: 69 BPM

## 2021-02-24 ASSESSMENT — MIFFLIN-ST. JEOR: SCORE: 1314.18

## 2021-03-04 ENCOUNTER — HOSPITAL ENCOUNTER (OUTPATIENT)
Dept: CARDIOLOGY | Facility: CLINIC | Age: 71
Discharge: HOME | End: 2021-03-04

## 2021-03-04 DIAGNOSIS — R42 DIZZINESS: ICD-10-CM

## 2021-03-04 DIAGNOSIS — R07.89 CHEST PRESSURE: ICD-10-CM

## 2021-03-04 DIAGNOSIS — R55 PRE-SYNCOPE: ICD-10-CM

## 2021-03-04 LAB
AORTIC ROOT: 2.9 CM
AORTIC VALVE MEAN VELOCITY: 77.5 CM/S
ASCENDING AORTA: 3.2 CM
AV DIMENSIONLESS INDEX VTI: 0.8
AV MEAN GRADIENT: 3 MMHG
AV PEAK GRADIENT: 6.1 MMHG
AV VALVE AREA: 2.7 CM2
AV VELOCITY RATIO: 0.8
BSA FOR ECHO PROCEDURE: 1.9 M2
CV BLOOD PRESSURE: NORMAL MMHG
CV ECHO HEIGHT: 64 IN
CV ECHO WEIGHT: 176 LBS
DOP CALC AO PEAK VEL: 123 CM/S
DOP CALC AO VTI: 23.4 CM
DOP CALC LVOT AREA: 3.46 CM2
DOP CALC LVOT DIAMETER: 2.1 CM
DOP CALC LVOT PEAK VEL: 100 CM/S
DOP CALC LVOT STROKE VOLUME: 64 CM3
DOP CALCLVOT PEAK VEL VTI: 18.5 CM
EJECTION FRACTION: 75 % (ref 55–75)
FRACTIONAL SHORTENING: 38.5 % (ref 28–44)
INTERVENTRICULAR SEPTUM IN END DIASTOLE: 0.69 CM (ref 0.6–0.9)
IVS/PW RATIO: 0.8
LA AREA 1: 12.6 CM2
LA AREA 2: 14.8 CM2
LEFT ATRIUM LENGTH: 4.13 CM
LEFT ATRIUM SIZE: 2.9 CM
LEFT ATRIUM TO AORTIC ROOT RATIO: 1.07 NO UNITS
LEFT ATRIUM VOLUME INDEX: 20.2 ML/M2
LEFT ATRIUM VOLUME: 38.4 ML
LEFT VENTRICLE CARDIAC INDEX: 2.3 L/MIN/M2
LEFT VENTRICLE CARDIAC OUTPUT: 4.3 L/MIN
LEFT VENTRICLE DIASTOLIC VOLUME INDEX: 32.1 CM3/M2 (ref 29–61)
LEFT VENTRICLE DIASTOLIC VOLUME: 61 CM3 (ref 46–106)
LEFT VENTRICLE HEART RATE: 67 BPM
LEFT VENTRICLE MASS INDEX: 54 G/M2
LEFT VENTRICLE SYSTOLIC VOLUME INDEX: 7.9 CM3/M2 (ref 8–24)
LEFT VENTRICLE SYSTOLIC VOLUME: 15 CM3 (ref 14–42)
LEFT VENTRICULAR INTERNAL DIMENSION IN DIASTOLE: 4.26 CM (ref 3.8–5.2)
LEFT VENTRICULAR INTERNAL DIMENSION IN SYSTOLE: 2.62 CM (ref 2.2–3.5)
LEFT VENTRICULAR MASS: 102.6 G
LEFT VENTRICULAR OUTFLOW TRACT MEAN GRADIENT: 2 MMHG
LEFT VENTRICULAR OUTFLOW TRACT MEAN VELOCITY: 65.1 CM/S
LEFT VENTRICULAR OUTFLOW TRACT PEAK GRADIENT: 4 MMHG
LEFT VENTRICULAR POSTERIOR WALL IN END DIASTOLE: 0.9 CM (ref 0.6–0.9)
LV STROKE VOLUME INDEX: 33.7 ML/M2
MITRAL VALVE E/A RATIO: 0.7
MV AVERAGE E/E' RATIO: 12.5 CM/S
MV DECELERATION TIME: 222 MS
MV E'TISSUE VEL-LAT: 6.73 CM/S
MV E'TISSUE VEL-MED: 5.07 CM/S
MV LATERAL E/E' RATIO: 10.9
MV MEDIAL E/E' RATIO: 14.5
MV PEAK A VELOCITY: 106 CM/S
MV PEAK E VELOCITY: 73.5 CM/S
NUC REST DIASTOLIC VOLUME INDEX: 2816 LBS
NUC REST SYSTOLIC VOLUME INDEX: 64 IN
TRICUSPID REGURGITATION PEAK PRESSURE GRADIENT: 22.1 MMHG
TRICUSPID VALVE ANULAR PLANE SYSTOLIC EXCURSION: 2.3 CM
TRICUSPID VALVE PEAK REGURGITANT VELOCITY: 235 CM/S

## 2021-03-04 ASSESSMENT — MIFFLIN-ST. JEOR: SCORE: 1313.33

## 2021-03-09 ENCOUNTER — COMMUNICATION - HEALTHEAST (OUTPATIENT)
Dept: INTERNAL MEDICINE | Facility: CLINIC | Age: 71
End: 2021-03-09

## 2021-03-09 ENCOUNTER — HOSPITAL ENCOUNTER (OUTPATIENT)
Dept: CT IMAGING | Facility: CLINIC | Age: 71
Discharge: HOME | End: 2021-03-09

## 2021-03-09 ENCOUNTER — HOSPITAL ENCOUNTER (OUTPATIENT)
Dept: ULTRASOUND IMAGING | Facility: CLINIC | Age: 71
Discharge: HOME | End: 2021-03-09

## 2021-03-09 DIAGNOSIS — R55 PRE-SYNCOPE: ICD-10-CM

## 2021-03-09 DIAGNOSIS — R42 DIZZINESS: ICD-10-CM

## 2021-03-10 ENCOUNTER — COMMUNICATION - HEALTHEAST (OUTPATIENT)
Dept: INTERNAL MEDICINE | Facility: CLINIC | Age: 71
End: 2021-03-10

## 2021-03-17 ENCOUNTER — OFFICE VISIT - HEALTHEAST (OUTPATIENT)
Dept: INTERNAL MEDICINE | Facility: CLINIC | Age: 71
End: 2021-03-17

## 2021-03-17 ENCOUNTER — COMMUNICATION - HEALTHEAST (OUTPATIENT)
Dept: ADMINISTRATIVE | Facility: CLINIC | Age: 71
End: 2021-03-17

## 2021-03-17 ENCOUNTER — AMBULATORY - HEALTHEAST (OUTPATIENT)
Dept: NURSING | Facility: CLINIC | Age: 71
End: 2021-03-17

## 2021-03-17 DIAGNOSIS — I10 ESSENTIAL HYPERTENSION: ICD-10-CM

## 2021-03-17 DIAGNOSIS — R55 POSTURAL DIZZINESS WITH PRESYNCOPE: ICD-10-CM

## 2021-03-17 DIAGNOSIS — R07.89 CHEST PRESSURE: ICD-10-CM

## 2021-03-17 DIAGNOSIS — Z13.220 LIPID SCREENING: ICD-10-CM

## 2021-03-17 DIAGNOSIS — R42 POSTURAL DIZZINESS WITH PRESYNCOPE: ICD-10-CM

## 2021-03-17 LAB
ANION GAP SERPL CALCULATED.3IONS-SCNC: 12 MMOL/L (ref 5–18)
BUN SERPL-MCNC: 15 MG/DL (ref 8–22)
CALCIUM SERPL-MCNC: 10.5 MG/DL (ref 8.5–10.5)
CHLORIDE BLD-SCNC: 101 MMOL/L (ref 98–107)
CHOLEST SERPL-MCNC: 179 MG/DL
CO2 SERPL-SCNC: 28 MMOL/L (ref 22–31)
CREAT SERPL-MCNC: 0.78 MG/DL (ref 0.6–1.1)
FASTING STATUS PATIENT QL REPORTED: YES
GFR SERPL CREATININE-BSD FRML MDRD: >60 ML/MIN/1.73M2
GLUCOSE BLD-MCNC: 101 MG/DL (ref 70–125)
HDLC SERPL-MCNC: 80 MG/DL
LDLC SERPL CALC-MCNC: 84 MG/DL
POTASSIUM BLD-SCNC: 4.4 MMOL/L (ref 3.5–5)
SODIUM SERPL-SCNC: 141 MMOL/L (ref 136–145)
TRIGL SERPL-MCNC: 76 MG/DL

## 2021-03-17 ASSESSMENT — MIFFLIN-ST. JEOR: SCORE: 1299.72

## 2021-03-18 ENCOUNTER — AMBULATORY - HEALTHEAST (OUTPATIENT)
Dept: INTERNAL MEDICINE | Facility: CLINIC | Age: 71
End: 2021-03-18

## 2021-03-18 DIAGNOSIS — I10 ESSENTIAL HYPERTENSION: ICD-10-CM

## 2021-03-21 ENCOUNTER — COMMUNICATION - HEALTHEAST (OUTPATIENT)
Dept: INTERNAL MEDICINE | Facility: CLINIC | Age: 71
End: 2021-03-21

## 2021-03-21 DIAGNOSIS — I10 ESSENTIAL HYPERTENSION: ICD-10-CM

## 2021-05-12 ENCOUNTER — OFFICE VISIT - HEALTHEAST (OUTPATIENT)
Dept: INTERNAL MEDICINE | Facility: CLINIC | Age: 71
End: 2021-05-12

## 2021-05-12 DIAGNOSIS — R05.9 COUGH: ICD-10-CM

## 2021-05-17 ENCOUNTER — OFFICE VISIT - HEALTHEAST (OUTPATIENT)
Dept: FAMILY MEDICINE | Facility: CLINIC | Age: 71
End: 2021-05-17

## 2021-05-17 DIAGNOSIS — J18.9 PNEUMONIA DUE TO INFECTIOUS ORGANISM, UNSPECIFIED LATERALITY, UNSPECIFIED PART OF LUNG: ICD-10-CM

## 2021-05-27 VITALS
SYSTOLIC BLOOD PRESSURE: 112 MMHG | OXYGEN SATURATION: 99 % | BODY MASS INDEX: 28.44 KG/M2 | WEIGHT: 168.3 LBS | DIASTOLIC BLOOD PRESSURE: 68 MMHG | HEART RATE: 69 BPM

## 2021-05-27 ASSESSMENT — PATIENT HEALTH QUESTIONNAIRE - PHQ9: SUM OF ALL RESPONSES TO PHQ QUESTIONS 1-9: 10

## 2021-05-28 ASSESSMENT — ANXIETY QUESTIONNAIRES: GAD7 TOTAL SCORE: 7

## 2021-06-01 ENCOUNTER — HOSPITAL ENCOUNTER (OUTPATIENT)
Dept: CT IMAGING | Facility: CLINIC | Age: 71
Discharge: HOME | End: 2021-06-01
Attending: FAMILY MEDICINE
Payer: COMMERCIAL

## 2021-06-01 DIAGNOSIS — J18.9 PNEUMONIA DUE TO INFECTIOUS ORGANISM, UNSPECIFIED LATERALITY, UNSPECIFIED PART OF LUNG: ICD-10-CM

## 2021-06-02 ENCOUNTER — COMMUNICATION - HEALTHEAST (OUTPATIENT)
Dept: FAMILY MEDICINE | Facility: CLINIC | Age: 71
End: 2021-06-02

## 2021-06-02 ENCOUNTER — AMBULATORY - HEALTHEAST (OUTPATIENT)
Dept: FAMILY MEDICINE | Facility: CLINIC | Age: 71
End: 2021-06-02

## 2021-06-02 DIAGNOSIS — R91.8 LUNG MASS: ICD-10-CM

## 2021-06-03 ENCOUNTER — COMMUNICATION - HEALTHEAST (OUTPATIENT)
Dept: ONCOLOGY | Facility: CLINIC | Age: 71
End: 2021-06-03

## 2021-06-04 ENCOUNTER — AMBULATORY - HEALTHEAST (OUTPATIENT)
Dept: ONCOLOGY | Facility: HOSPITAL | Age: 71
End: 2021-06-04

## 2021-06-04 DIAGNOSIS — R91.8 MASS OF LOWER LOBE OF LEFT LUNG: ICD-10-CM

## 2021-06-05 VITALS
WEIGHT: 170.5 LBS | DIASTOLIC BLOOD PRESSURE: 80 MMHG | BODY MASS INDEX: 28.81 KG/M2 | HEART RATE: 60 BPM | SYSTOLIC BLOOD PRESSURE: 170 MMHG | OXYGEN SATURATION: 97 %

## 2021-06-05 VITALS
OXYGEN SATURATION: 98 % | BODY MASS INDEX: 28.49 KG/M2 | HEART RATE: 67 BPM | WEIGHT: 166 LBS | SYSTOLIC BLOOD PRESSURE: 150 MMHG | DIASTOLIC BLOOD PRESSURE: 86 MMHG

## 2021-06-05 VITALS
OXYGEN SATURATION: 96 % | HEIGHT: 64 IN | TEMPERATURE: 97.7 F | WEIGHT: 173 LBS | BODY MASS INDEX: 29.53 KG/M2 | SYSTOLIC BLOOD PRESSURE: 110 MMHG | HEART RATE: 80 BPM | DIASTOLIC BLOOD PRESSURE: 70 MMHG

## 2021-06-05 VITALS — BODY MASS INDEX: 30.05 KG/M2 | WEIGHT: 176 LBS | HEIGHT: 64 IN

## 2021-06-05 VITALS
HEART RATE: 77 BPM | SYSTOLIC BLOOD PRESSURE: 147 MMHG | WEIGHT: 176.19 LBS | BODY MASS INDEX: 30.08 KG/M2 | DIASTOLIC BLOOD PRESSURE: 89 MMHG | HEIGHT: 64 IN | OXYGEN SATURATION: 96 %

## 2021-06-07 ENCOUNTER — OFFICE VISIT - HEALTHEAST (OUTPATIENT)
Dept: PULMONOLOGY | Facility: OTHER | Age: 71
End: 2021-06-07

## 2021-06-07 DIAGNOSIS — J18.9 PNEUMONIA DUE TO INFECTIOUS ORGANISM, UNSPECIFIED LATERALITY, UNSPECIFIED PART OF LUNG: ICD-10-CM

## 2021-06-07 DIAGNOSIS — R05.9 COUGH: ICD-10-CM

## 2021-06-07 DIAGNOSIS — R91.8 LUNG MASS: ICD-10-CM

## 2021-06-07 ASSESSMENT — MIFFLIN-ST. JEOR: SCORE: 1258.9

## 2021-06-09 ENCOUNTER — COMMUNICATION - HEALTHEAST (OUTPATIENT)
Dept: PULMONOLOGY | Facility: OTHER | Age: 71
End: 2021-06-09

## 2021-06-09 DIAGNOSIS — J18.9 PNEUMONIA DUE TO INFECTIOUS ORGANISM: ICD-10-CM

## 2021-06-14 NOTE — PROGRESS NOTES
Assessment & Plan     Andree was seen today for hypertension.    Diagnoses and all orders for this visit:    Essential hypertension, when I met her for the first time December 21, 2020, I switched her labetalol to metoprolol.  Her blood pressure today is still elevated at 150/80.  Diastolic blood pressure is improved, this was 86.  She was seen in the emergency department December 14, with a very elevated blood pressure.  I am adding in amlodipine 2.5 mg daily, as I cannot increase her metoprolol, as her heart rate is 60.  Patient has also been having some episodes of dizziness, which sounds positional.  I will see her again in 1 month, to see what her blood pressures like.  I am sending her to the lab for a BMP, as she is on hydrochlorothiazide 25 mg daily.  -     metoprolol succinate (TOPROL-XL) 100 MG 24 hr tablet; Take 1 tablet (100 mg total) by mouth daily. Take 50 mg each morning for 14 days then 100 mg (two tablets) each morning and continue with this dosing.  -     Basic Metabolic Panel  -     amLODIPine (NORVASC) 2.5 MG tablet; Take 1 tablet (2.5 mg total) by mouth every morning.    Dizziness, patient describes dizziness, which she says occurred last night, and occurs if she gets up from bed.  When I saw her December 21, 2020, she was also complaining of some dizziness, and I decreased her gabapentin from 300/300/600, to 300 mg 3 times a day.  I wish to decrease this further to 200 mg 3 times a day, as she has recurrent symptoms of dizziness, however the patient is not sure what dose of medication she is taking.  I have asked her to check when she gets home, and to let us know.    Hyperlipidemia LDL goal <130, review of her record, patient had lipid study done December 2, 2020: LDL 75, HDL 90, triglycerides 71, and total cholesterol 179.  Patient is on Lipitor 10 mg daily. Will follow up.     Need for influenza vaccination, patient decided she will get this next time, so it is deferred.  -      "Influenza,Quad,High Dose,PF 65 YR+    Screen for colon cancer, patient says she has had the stool test. Not that keen to do a physical yet, feels she had one at the end of last year.      40 minutes spent on the date of the encounter doing chart review, review of outside records, review of test results, patient visit and documentation        BMI:   Estimated body mass index is 29.27 kg/m  as calculated from the following:    Height as of 12/14/20: 5' 4\" (1.626 m).    Weight as of this encounter: 170 lb 8 oz (77.3 kg).       Return in about 4 weeks (around 2/22/2021), or Follow up on blood pressure and dizziness.    Yani Miller MD  [facility]     Andree Trujillo is 70 y.o. and presents to clinic today for the following health issues     HPI   I saw the patient December 21, 2020, in follow-up after an emergency department visit.  Blood pressure was elevated at the emergency department at 210/100.  Patient was discharged with labetalol.  When I saw her, her blood pressure was improved at 150/86.  She was on hydrochlorothiazide 25 mg daily, and labetalol 200 mg twice a day.  Patient had been on labetalol in the past.  I discussed with the patient, that I wish to switch her labetalol to metoprolol, and I am following up with her today to follow-up on her hypertension.  She is on metoprolol 100 mg each morning.  Her blood pressure today is still elevated at 150/86.  BMP was normal when checked December 14, 2020 in the emergency department.    Other problems included low back pain, and muscle pain.  Patient was on gabapentin 600/300/300, however she was complaining of dizziness, and I switched her gabapentin to 300 mg 3 times a day.  Patient was also taking Aleve for back pain.  She has had previous steroid shots in her back, and when I saw her December 21, she was to have another injection in her back, December 23, 2020.    Anxiety, previously on Effexor, which " affected her sleep.  Currently she is not on any medications for anxiety.  Restless leg syndrome, on Mirapex 0.125 mg at bedtime.      Review of Systems   Rest the review systems is negative.        Objective    /80   Pulse 60   Wt 170 lb 8 oz (77.3 kg)   SpO2 97%   BMI 29.27 kg/m    Body mass index is 29.27 kg/m .  Physical Exam   Constitutional: No distress.   Cardiovascular: Normal rate and regular rhythm.   No murmur heard.  Pulmonary/Chest: No respiratory distress. She has no wheezes. She has no rales.   Musculoskeletal:         General: No edema.

## 2021-06-14 NOTE — PATIENT INSTRUCTIONS - HE
1. Check at home to see what dose of Gabapentin you are taking. We have in the record that you are taking 300 mg three times a day. Let me know what it is.  2. If the dose is 300 mg, I would plan on decreasing this to 200 mg three times a day to help with the dizzy sensation you have been getting. Gabapentin does not come in a 200 mg dose, so you would then have to take two 100 mg pills three times a day.

## 2021-06-15 NOTE — PROGRESS NOTES
"    Assessment & Plan     Andree was seen today for follow-up, medication request and med gabapentin.    Diagnoses and all orders for this visit:    Dizziness, worse when she gets up after lying flat, also with changes in position.  I am sending her for ultrasound of her carotids, and an echocardiogram.  May consider referral to the dizzy center if this continues.  -     US Carotid Bilateral; Future  -     Echo Complete; Future    Chronic right-sided low back pain with right-sided sciatica, we have slowly been decreasing her gabapentin, to see if this helps her dizziness.  However she continues to have right-sided sacroiliac joint pain with radiation down into her right buttock.  -     gabapentin (NEURONTIN) 300 MG capsule; Take 1 capsule (300 mg total) by mouth 2 (two) times a day.    Restless legs syndrome  -     pramipexole (MIRAPEX) 0.125 MG tablet; Take 1 tablet (0.125 mg total) by mouth at bedtime.    Essential hypertension, patient's blood pressure continues to be elevated at 147/89, however this is better than it was before.  Continue with amlodipine 2.5 mg daily.  -     amLODIPine (NORVASC) 2.5 MG tablet; Take 1 tablet (2.5 mg total) by mouth every morning.    Pre-syncope, as above.  -     CTA Head and Neck; Future  -     US Carotid Bilateral; Future  -     Echo Complete; Future    Chest pressure, ECG done today.  This is reviewed by me.  Sinus rhythm 69 bpm.  No ST segment elevation or depression.  No abnormal T wave inversion.  Small R wave in 3.   -     Electrocardiogram Perform and Read  -     Echo Complete; Future      30 minutes spent on the date of the encounter doing chart review, review of test results, patient visit and documentation   {     BMI:   Estimated body mass index is 30.24 kg/m  as calculated from the following:    Height as of this encounter: 5' 4\" (1.626 m).    Weight as of this encounter: 176 lb 3 oz (79.9 kg).       Return in about 4 weeks (around 3/24/2021), or Follow up of " dizziness, chest pressure, 40 minutes with Dr. Miller. Come fasting,.    Yani Miller MD  [facility]   Andree Trujillo is 70 y.o. and presents today for the following health issues     HPI   I saw her again 1/25/2021.  First visit with her December 21, 2020 [after an emergency department visit, December 14, with very elevated blood pressure].  I switched her labetalol to metoprolol.  Blood pressure when I saw her January 25, was still elevated at 150/80.  I added in amlodipine 2.5 mg daily.  Here today to follow-up on blood pressure.  Patient is also on hydrochlorothiazide 25 mg daily.    January 25, 2021: Sodium 135, potassium 3.8, chloride 96, CO2 27, BUN 16, creatinine 0.67, estimated GFR more than 60.  CBC October 5, 2018, through [facility]: White cell count 8, hemoglobin 13.6, MCV 91, platelet count 355. Has not had a recent lipid panel.     Previous bone density November 13, 2009, I cannot see the results, there is a message from the doctor saying that this looked good.    Body mass index is 30.24 kg/m .     Patient had dizziness, she was on gabapentin 300/300/600, and I decreased this to 300 mg 3 times a day December 21, 2020.  When I saw her January 25, 2021, I decreased this further to 200 mg 3 times a day.  She has osteoarthritis of the spine with radiculopathy, lumbar region.  Spinal stenosis with neurogenic claudication.    Hyperlipidemia, on Lipitor 10 mg daily.  There is no lipid test in Care Everywhere.     Pain over the right sacroiliac joint and down into her right buttock.  Pain comes depending on what she is doing.  She had a hip cortisone injection December 18, left side.    She does not feel steady on her feet.  If she flips in bed she gets a dizzy sensation.  If she gets up she has a dizzy sensation.  No vision changes.  No headache.    She complains of chest pressure at night.    The ASCVD Risk score (Quang KANDY Jr., et al., 2013) failed to  "calculate for the following reasons:    Cannot find a previous HDL lab    Cannot find a previous total cholesterol lab      Review of Systems   Rest of the review of systems is negative.        Objective    /89 (Patient Site: Right Arm, Patient Position: Sitting, Cuff Size: Adult Large)   Pulse 77   Ht 5' 4\" (1.626 m)   Wt 176 lb 3 oz (79.9 kg)   SpO2 96%   BMI 30.24 kg/m    Body mass index is 30.24 kg/m .  Physical Exam  Not distressed, chest clear, normal heart sounds, no murmurs. No peripheral edema.  She has a bruise on her leg.  Patient has livedo changes behind her right calf.          "

## 2021-06-15 NOTE — PROGRESS NOTES
The CT scan of your head, has not shown any abnormalities, such as stroke.  Blood flow to your brain is normal.    Yani Miller

## 2021-06-15 NOTE — PROGRESS NOTES
The ultrasound of your carotids, looks good.  There is no significant blockage in the left or right coronary arteries.    Yani Miller MD

## 2021-06-16 NOTE — TELEPHONE ENCOUNTER
Please send in new rx if appropriate. Thanks.    Results from occult blood test from 9/25/2019 in chart under labs.

## 2021-06-16 NOTE — TELEPHONE ENCOUNTER
Refill Approved    Rx renewed per Medication Renewal Policy. Medication was last renewed on 2/24/2021.    Tameka Jiang, [facility] Triage/Med Refill 3/22/2021     Requested Prescriptions   Pending Prescriptions Disp Refills     amLODIPine (NORVASC) 2.5 MG tablet [Pharmacy Med Name: amLODIPine Besylate 2.5 MG Oral Tablet] 30 tablet 0     Sig: TAKE 1 TABLET BY MOUTH ONCE DAILY IN THE MORNING       Calcium-Channel Blockers Protocol Passed - 3/21/2021  9:19 AM        Passed - PCP or prescribing provider visit in past 12 months or next 3 months     Last office visit with prescriber/PCP: 3/17/2021 Yani Miller MD OR same dept: 3/17/2021 Yani Miller MD OR same specialty: 3/17/2021 Yani Miller MD  Last physical: Visit date not found Last MTM visit: Visit date not found   Next visit within 3 mo: Visit date not found  Next physical within 3 mo: Visit date not found  Prescriber OR PCP: Yani Miller MD  Last diagnosis associated with med order: 1. Essential hypertension  - amLODIPine (NORVASC) 2.5 MG tablet [Pharmacy Med Name: amLODIPine Besylate 2.5 MG Oral Tablet]; TAKE 1 TABLET BY MOUTH ONCE DAILY IN THE MORNING  Dispense: 30 tablet; Refill: 0    If protocol passes may refill for 12 months if within 3 months of last provider visit (or a total of 15 months).             Passed - Blood pressure filed in past 12 months     BP Readings from Last 1 Encounters:   03/17/21 110/70

## 2021-06-16 NOTE — PATIENT INSTRUCTIONS - HE
1. The dose of your Hydrochlorothiazide should be 12.5 mg every morning.     2. Can you find the result of the stool test you had done last year.

## 2021-06-16 NOTE — TELEPHONE ENCOUNTER
Reason for Call:  Medication or medication refill:    Do you use a [facility]?  Name of the pharmacy and phone number for the current request: [facility] [place] on [address]    Name of the medication requested: Hydrochlorothiazide 12.5mg    Other request: Pt calling back with her dose of the hydrochlorothiazide it is 25mg, you wanted her to cut it down to 12.5, can you send in a new rx? She doesn't have a pill cutter. Pt would like call back when new rx sent in.     Also you wanted her cologuard results, she doesn't have it but her old provider Dr. Dona Bell with [facility] should have the results.     Can we leave a detailed message on this number? Yes    Phone number patient can be reached at: Home number on file [phone] (home)    Best Time: anytime    Call taken on 3/17/2021 at 11:31 AM by Macrina Matthew

## 2021-06-17 NOTE — PROGRESS NOTES
The patient reports a 4 week course of cough, shortness of breath, fatigue, fever, sweats, chills, and feeling a pain her back. She reports her symptoms continue to worsen, she has trouble taking deep breaths. She has had both COVID shots. Encouraged her to present to an Urgent Care or ER for evaluation, possible pneumonia. She was in agreement with this plan. No charge visit today.

## 2021-06-17 NOTE — PROGRESS NOTES
Andree Trujillo your kidney function, and bad cholesterol (the LDL) are all normal.    Please continue with the Lipitor that you are taking, as this is keeping your LDL normal.    Yani Miller MD

## 2021-06-17 NOTE — PROGRESS NOTES
Hospital Follow-up Visit:    Assessment/Plan:     1. Pneumonia due to infectious organism, unspecified laterality, unspecified part of lung  Left lung mass consolidation, infectious process versus neoplasm, patient has not yet responded to therapy.  - CT Chest With Contrast; Future  - codeine-guaiFENesin (GUAIFENESIN AC)  mg/5 mL liquid; Take 10 mL by mouth 4 (four) times a day as needed.  Dispense: 240 mL; Refill: 0    PLAN:  1.  For now continue the patient on Levaquin.  2.  Add cough medicine with codeine.  3.  CT scan of the chest for follow-up  4.  Depending on the above results, I have to make a decision as to whether or not antibiotic treatment should be extended or change, or the patient will need pulmonology consult.           Subjective:     Andree Trujillo is a 70 y.o. female who presents for a hospital discharge follow up.  Patient reports that she was not feeling well for almost a month prior to her going to the emergency room on May 12, there she was diagnosed with presumably pneumonia and is being treated with Levaquin, there was a large mass consolidation noted in the left lung.  The radiologist stated that this could be a consolidated pneumonia however it is also possible that there is a neoplasm, and short interval follow-up was recommended.    Patient reports that she is not feeling any better, she still has a cough she feels short of breath her sats are 99% of note, and she just feels very tired and weak.    He was not around anybody who was sick, she quit smoking over 30 years ago she does not have a history of lung problems and there was no other recent interval change in her health.    I discussed with the patient that I could not make a decision as to whether or not she needs further antibiotics or not, I think getting another CT scan is warranted and reasonable, however she is not getting much relief from OTC cough medicines and cough syrup I think she would benefit from some cough  medicine with codeine.    Hospital/Nursing Home/IP Rehab Facility: [facility]   Date of Admission:   Date of Discharge:  Reason(s) for Admission:Cough              Do you have any problems taking your medication regularly?  None       Have you had any changes in your medication since discharge? None       Have you had any difficulty following your discharge or treatment plan?  No    Summary of hospitalization:  Hospital discharge summary reviewed  Diagnostic Tests/Treatments reviewed.  Follow up needed: None  Other Healthcare Providers Involved in Patient's Care: Patient Care Team:  Yani Miller MD as PCP - General (Internal Medicine)  Yani Miller MD as Assigned PCP      Update since discharge: {no change   Information from family, SNF, care coordination: [facility] records    Post Discharge Medication Reconciliation: discharge medications reconciled, continue medications without change  Plan of care communicated with: patient    Objective:     Vitals:    05/17/21 1259   BP: 112/68   Pulse: 69   SpO2: 99%   Weight: 168 lb 4.8 oz (76.3 kg)         Physical Exam:  Physical Exam   Constitutional: She is oriented to person, place, and time. She appears well-developed and well-nourished.   HENT:   Head: Normocephalic and atraumatic.   Eyes: Pupils are equal, round, and reactive to light.   Cardiovascular: Normal rate, regular rhythm and normal heart sounds.   Pulmonary/Chest: Effort normal and breath sounds normal.   Neurological: She is alert and oriented to person, place, and time.   Skin: Skin is warm and dry.   Psychiatric: She has a normal mood and affect. Her behavior is normal. Judgment and thought content normal.           Coding guidelines for this visit:  Type of Medical   Decision Making Face-to-Face Visit       within 7 Days of discharge Face-to-Face Visit        within 14 days of discharge   Moderate Complexity [id number]   High Complexity [id number]       Electronically  signed by Dwight Ricci MD 05/17/21 12:59 PM

## 2021-06-21 NOTE — LETTER
Letter by Yani Miller MD at      Author: Yani Miller MD Service: -- Author Type: --    Filed:  Encounter Date: 3/10/2021 Status: (Other)         Andree Trujillo  [address] [facility] [address]             March 10, 2021         Dear Ms. Trujillo,    Below are the results from your recent visit:    Resulted Orders   CTA Head and Neck    Narrative    EXAM: CTA HEAD AND NECK  LOCATION: [facility]  DATE/TIME: 3/9/2021 3:44 PM    INDICATION: Dizziness, non-specific Dizziness, persistent/recurrent, cardiac or vascular cause suspected Patient is having dizziness, sometimes vertigo. THis sensation remains even though I have decreased her Gabapentin and she even went off it.  COMPARISON: None.  CONTRAST: Iohexol (Omni) 75mL  TECHNIQUE: Head and neck CT angiogram with IV contrast. Noncontrast head CT followed by axial helical CT images of the head and neck vessels obtained during the arterial phase of intravenous contrast administration. Axial 2D reconstructed images and   multiplanar 3D MIP reconstructed images of the head and neck vessels were performed by the technologist. Dose reduction techniques were used. All stenosis measurements made according to NASCET criteria unless otherwise specified.    FINDINGS:   NONCONTRAST HEAD CT:   INTRACRANIAL CONTENTS: No intracranial hemorrhage, extraaxial collection, or mass effect.  No CT evidence of acute infarct. Mild presumed chronic small vessel ischemic changes. Mild generalized volume loss. No hydrocephalus.     VISUALIZED ORBITS/SINUSES/MASTOIDS: No intraorbital abnormality. No paranasal sinus mucosal disease. No middle ear or mastoid effusion.    BONES/SOFT TISSUES: No acute abnormality.    HEAD CTA:  ANTERIOR CIRCULATION: No stenosis/occlusion, aneurysm, or high flow vascular malformation. Standard [place] of [place] anatomy.    POSTERIOR CIRCULATION: No stenosis/occlusion, aneurysm, or high flow vascular malformation.  Balanced vertebral arteries supply a normal basilar artery.     DURAL VENOUS SINUSES: Expected enhancement of the major dural venous sinuses.    NECK CTA:  RIGHT CAROTID: No measurable stenosis or dissection.    LEFT CAROTID: No measurable stenosis or dissection.    VERTEBRAL ARTERIES: No focal stenosis or dissection. Balanced vertebral arteries.    AORTIC ARCH: Classic aortic arch anatomy with no significant stenosis at the origin of the great vessels.    NONVASCULAR STRUCTURES: Incidental subcentimeter right thyroid nodule.      Impression    HEAD CT:  1.  Normal head CT.    HEAD CTA:   1.  Normal CTA [place] of [place].    NECK CTA:  1.  Normal neck CTA.       The CT scan of your head, has not shown any abnormalities, such as stroke.  Blood flow to your brain is normal.    Please call with questions or contact us using [employer].    Sincerely,        Electronically signed by Yani Miller MD

## 2021-06-21 NOTE — LETTER
Letter by Yani Miller MD at      Author: aYni Miller MD Service: -- Author Type: --    Filed:  Encounter Date: 3/9/2021 Status: (Other)         Andree Trujillo  [address] [facility] [address]             March 9, 2021         Dear Ms. Trujillo,    Below are the results from your recent visit:    Resulted Orders   US Carotid Bilateral    Narrative    EXAM: US CAROTID BILATERAL  LOCATION: [facility]  DATE/TIME: 3/9/2021 4:22 PM    INDICATION: Dizziness, non-specific Dizziness, persistent/recurrent, cardiac or vascular cause suspected Patient is having vertigo or dizzy sensation, worse with changing positions.  COMPARISON: None.  TECHNIQUE: Duplex exam performed utilizing 2D gray-scale imaging, Doppler interrogation with color-flow and spectral waveform analysis. The percent diameter stenosis is determined using NASCET criteria and Society of Radiologists in Ultrasound Consensus   Criteria.    FINDINGS:    RIGHT: Mild plaque at the bifurcation. The peak systolic velocity in the ICA is less than 125 cm/sec, consistent with less than 50% stenosis. Normal velocities in the ECA. Antegrade flow within the vertebral artery.     LEFT: Mild plaque at the bifurcation. The peak systolic velocity in the ICA is less than 125 cm/sec, consistent with less than 50% stenosis. Normal velocities in the ECA. Antegrade flow within the vertebral artery.    VELOCITY CHART:  CCA   Right: 84/15 cm/s   Left: 100/22 cm/s  ICA   Right: 117/33 cm/s   Left: 111/37 cm/s  ECA   Right: 136/19 cm/s   Left: 131/23 cm/s  ICA/CCA PSV Ratio   Right: 1.4   Left: 1.1      Impression    1.  Mild plaque formation, velocities consistent with less than 50% stenosis in the right internal carotid artery.  2.  Mild plaque formation, velocities consistent with less than 50% stenosis in the left internal carotid artery.  3.  Flow within the vertebral arteries is antegrade.       The ultrasound of your  carotids, looks good.  There is no significant blockage in the left or right coronary arteries.    Please call with questions or contact us using [employer].    Sincerely,        Electronically signed by Yani Miller MD

## 2021-06-25 NOTE — TELEPHONE ENCOUNTER
Phone call again today from Helen. States she took some Benadryl last night, but also skipped her last dose of the antibiotic (clindamycin 3x daily).  This am took benadryl along with her first dose of clindamycin.  So far no itching.  Is asking if she should continue this routine, or change to a different antibiotic??    Reviewed with Dr. Fregoso in clinic and will prescribe:  Stop clindamycin.  Start Levofloxacin 750 every day x 2 weeks and metronidazole 500mg 3x daily x 2 weeks

## 2021-06-25 NOTE — TELEPHONE ENCOUNTER
New Patient Oncology Nurse Navigator Note     Referring provider: Dr. Dwight Ricci     Referring Clinic/Organization: [facility]     Referred to (specialty): Medical Oncology    Requested provider (if applicable): NA     Date Referral Received: 6/3/2021     Evaluation for : Lung mass     Clinical History (per Nurse review of records provided):  Patient had a virtual visit with primary care on 5/12/2021 for complaints of cough x 4 weeks, shortness of breath, fatigue, fever, sweats, chills and a feeling of pain in her back. She reported her symptoms continue to worsen and she was having trouble taking a deep breaths. She was referred to the  or ER for further evaluation of her symptoms.  CTA chest completed on 5/12/2021 at [facility] revealed a 5x5x4 cm masslike consolidation in the LLL, indeterminate for neoplasm vs consolidative pneumonia.  She was prescribed Levaquin and advised to follow up closely with primary care.  Patient scheduled follow up with primary care on 5/17/2021. She was advised to continue her course of Levaquin, cough medication was prescribed and follow up CT chest was ordered.  CT chest completed at  on 6/1/2021 showed slight interval decrease in size of the LLL but the lesion is highly suspicious for lung cancer. Patient was referred to oncology for further evaluation.      Clinical Assessment / Barriers to Care (Per Nurse): No known barriers to care       Records Location (Care Everywhere, Media, etc.): Epic   *CTA chest 5/12/2021 at the [facility]. Report in CE. Need imaging.  *CT chest 6/1/2021 at [facility].     Records Needed: Imaging from [facility]     Additional testing needed prior to consult: Discussed patient's case with Dr. Chairez and she advised referral to pulmonary prior to seeing oncology. Referral order was placed.    Call placed to [facility] and message was left on the nurse line. Awaiting call back to discuss plan of  care.  I called Helen, introduced myself and relayed the purpose of my call. She has been scheduled for a tentative appt with Dr. Chairez on June 15th. I informed her of the referral to pulmonary as well. She is aware her appt with Dr. Chairez may need to be rescheduled based on timing of her appts with the [facility].  Marva RN, returned my call from the [facility]. She will connect with the scheduling team to schedule a consult in their clinic, likely next week. Helen was notified and she will await their call. I provided the phone number and clinic address for her to have in hand as well.    6/7/2021: Patient was seen by Dr. Fregoso at the [facility] [place] today. He sent a message to Dr. Chairez and myself stating he believes Helen has a lung abscess, not cancer. He will follow closely after antibiotics. He feels her consult with Dr. Chairez on 6/15 can be canceled.  I called Helen today and she said her appt went well. She understands there is still a possibility she has cancer but she is relieved to hear this is likely an infection she's dealing with. I informed her the appt with Dr. Chairez has been canceled. Dr. Fregoso will follow her closely and refer back to us if needed. Helen was appreciative of the follow up call and she has no further questions or needs at this time.

## 2021-06-25 NOTE — TELEPHONE ENCOUNTER
Phone call from Helen.  Saw Dr. Fregoso on Monday and was started on clindamycin.    Calling today to let us know that yesterday she started having some itching on her stomach area.  Today just slight amount of itching.  No rash, no redness.  Has allergies to erythromycin and penicillin.      Reviewed with Dr. Wing in clinic today.  Will have her try taking 1/2 Benadryl and let us know if the itching goes away.  She will call tomorrow and let us know how she is doing and further direction from dr. Fregoso

## 2021-06-26 NOTE — PROGRESS NOTES
"Pulmonary consult    Assessment and plan: 70-year-old woman with out significant risk for lung cancer here with clinical picture suggestive of infection and CT scan suggesting lung abscess.    There is significant concern that this is lung cancer but I think given the appearance on imaging, the patient's clinical history and the lack of any mass in this location and a chest x-ray in December are all very hopeful.    I offered the patient a choice between antibiotics and follow-up CT scan in 6 weeks versus proceeding to bronchoscopy now.  She prefers antibiotics in the CT scan.  We reviewed the risks and benefits of either approach.    We need to use Clinda because she is penicillin allergic.  I discussed the risks associated with this medication.    Clindamycin for 2 to 4 weeks.  She will let me know how she is doing after 2 weeks    Chest CT in 6 weeks and follow-up with me    Chief complaint: Lung mass    HPI: 70-year-old woman with subacute prodrome of body aches, cough, feeling unwell found to have lung mass.  She denies any dental issues, no significant vomiting although she is now vomiting in the setting of significant cough, no weight loss fevers chills.    She does acknowledge that recently she started coughing up a little bit of blood.    She has never any significant exposure history from work.  Smoked for some time but quit 35 years ago.    Medications, allergies and history reviewed    On exam  /88   Pulse 97   Ht 5' 4\" (1.626 m)   Wt 164 lb (74.4 kg)   SpO2 96%   Breastfeeding Yes   BMI 28.15 kg/m    Calm, well-appearing  Some treated cavities but no obvious abscesses in her mouth  Neck supple  Chest clear without any crackles, no wheezes  Normal cardiac exam  Normal extremities    Chest CT reviewed interpreted by me: Initial CT not available, follow-up CT with rounded lung mass with some internal cavitation.  No significant lymphadenopathy although 11 [place] evident on the CT scan.  "

## 2021-06-28 ENCOUNTER — RECORDS - HEALTHEAST (OUTPATIENT)
Dept: GENERAL RADIOLOGY | Facility: CLINIC | Age: 71
End: 2021-06-28

## 2021-06-28 ENCOUNTER — OFFICE VISIT - HEALTHEAST (OUTPATIENT)
Dept: INTERNAL MEDICINE | Facility: CLINIC | Age: 71
End: 2021-06-28

## 2021-06-28 DIAGNOSIS — M54.41 ACUTE MIDLINE LOW BACK PAIN WITH BILATERAL SCIATICA: ICD-10-CM

## 2021-06-28 DIAGNOSIS — M89.49 OTHER HYPERTROPHIC OSTEOARTHROPATHY, MULTIPLE SITES: ICD-10-CM

## 2021-06-28 DIAGNOSIS — M54.42 ACUTE MIDLINE LOW BACK PAIN WITH BILATERAL SCIATICA: ICD-10-CM

## 2021-06-28 DIAGNOSIS — M54.9 DORSALGIA, UNSPECIFIED: ICD-10-CM

## 2021-06-28 DIAGNOSIS — G89.29 CHRONIC RIGHT-SIDED LOW BACK PAIN WITH RIGHT-SIDED SCIATICA: ICD-10-CM

## 2021-06-28 DIAGNOSIS — M54.9 UPPER BACK PAIN: ICD-10-CM

## 2021-06-28 DIAGNOSIS — I10 ESSENTIAL HYPERTENSION: ICD-10-CM

## 2021-06-28 DIAGNOSIS — M54.42 LUMBAGO WITH SCIATICA, LEFT SIDE: ICD-10-CM

## 2021-06-28 DIAGNOSIS — M15.0 PRIMARY OSTEOARTHRITIS INVOLVING MULTIPLE JOINTS: ICD-10-CM

## 2021-06-28 DIAGNOSIS — M54.2 CERVICALGIA: ICD-10-CM

## 2021-06-28 DIAGNOSIS — M54.41 LUMBAGO WITH SCIATICA, RIGHT SIDE: ICD-10-CM

## 2021-06-28 DIAGNOSIS — M54.41 CHRONIC RIGHT-SIDED LOW BACK PAIN WITH RIGHT-SIDED SCIATICA: ICD-10-CM

## 2021-06-28 DIAGNOSIS — M54.2 NECK PAIN: ICD-10-CM

## 2021-06-28 ASSESSMENT — PATIENT HEALTH QUESTIONNAIRE - PHQ9: SUM OF ALL RESPONSES TO PHQ QUESTIONS 1-9: 10

## 2021-06-28 ASSESSMENT — MIFFLIN-ST. JEOR: SCORE: 1267.97

## 2021-06-30 NOTE — PROGRESS NOTES
Progress Notes by Yani Miller MD at 12/21/2020  2:40 PM     Author: Yani Miller MD Service: -- Author Type: Physician    Filed: 1/3/2021 11:54 AM Encounter Date: 12/21/2020 Status: Signed    : Yani Miller MD (Physician)       Assessment/Plan:        Problem List Items Addressed This Visit        ENT/CARD/PULM/ENDO Problems    Essential hypertension, has been uncontrolled, but better than it was when she was in the emergency department.  I am decreasing hydrochlorothiazide to 12.5 mg.  I am switching her from labetalol to metoprolol.  Patient was given weaning doses for labetalol, and increasing doses for metoprolol.    Relevant Medications    hydroCHLOROthiazide (MICROZIDE) 12.5 mg capsule    labetaloL (TRANDATE; NORMODYNE) 100 MG tablet    metoprolol succinate (TOPROL XL) 50 MG 24 hr tablet       Other    Osteoarthritis of spine with radiculopathy, lumbar region    Spinal stenosis of lumbar region with neurogenic claudication    Right hip pain    Tendinitis of both feet    Patient has significant pain related to her lumbar spine, and has had injections in her back, and knee.  Patient has worse pain on the right side of her back, says that she had a herniated disc.  She will be having an injection of her right hip this coming week on Wednesday December 23.      Other Visit Diagnoses     Chronic right-sided low back pain with right-sided sciatica, pain from her back radiates down her right leg, however she is having dizziness, which may be related to gabapentin, currently 600/300/300.  I am decreasing this to 300 three times a day.    -  Primary    Relevant Medications    gabapentin (NEURONTIN) 300 MG capsule      Dizziness, may be related to changes in BP, or may be related to her gabapentin. I have decreased her gabapentin, and will be seeing her in 4 weeks, will see then what her BP is like with the changes I have made. Discussed with her that the Aleve she takes for  her back pain can also elevate her BP.        Subjective:    Patient ID: Ann Laboy is a 70 y.o. female.    HPI   Reason for visit today, says ED follow-up, new patient, BP concerns.  Patient was seen in the emergency department December 14, 2020.  Blood pressure was elevated to 211/100.  She was given oral labetalol [200 mg], and she was also given 10 mg intravenous.  Patient did not have any chest pain, headache, or EKG changes.    Patient was also complaining of dizziness, which had been occurring in the morning.  She had recently been placed on gabapentin for a herniated disc.    Patient's primary care provider is Dr. Carri Bell, [facility] [place].     For blood pressure in the [employer] system: Hydrochlorothiazide 25 mg daily, labetalol 200 mg twice a day, dated December 2, 2020.  Patient is also on Voltaren 75 mg, twice a day for pain.    DJD of lumbar spine, following with [facility].    Restless legs syndrome on Pramipexole.     Glucose, 12/2/2020, 9/23/2019, 10/5/2018, 11/10/2017  100 (H) 103 (H) 100 (H) 104 (H)     Results for ANN LABOY (MRN [id number]) as of 12/21/2020 14:46   Ref. Range 12/14/2020 18:37   Sodium Latest Ref Range: 136 - 145 mmol/L 137   Potassium Latest Ref Range: 3.5 - 5.0 mmol/L 3.7   Chloride Latest Ref Range: 98 - 107 mmol/L 100   CO2 Latest Ref Range: 22 - 31 mmol/L 28   Anion Gap, Calculation Latest Ref Range: 5 - 18 mmol/L 9   BUN Latest Ref Range: 8 - 22 mg/dL 14   Creatinine Latest Ref Range: 0.60 - 1.10 mg/dL 0.73   GFR MDRD Af Amer Latest Ref Range: >60 mL/min/1.73m2 >60   GFR MDRD Non Af Amer Latest Ref Range: >60 mL/min/1.73m2 >60   Calcium Latest Ref Range: 8.5 - 10.5 mg/dL 9.7   AST Latest Ref Range: 0 - 40 U/L 23   ALT Latest Ref Range: 0 - 45 U/L 27   ALBUMIN Latest Ref Range: 3.5 - 5.0 g/dL 4.4   Protein, Total Latest Ref Range: 6.0 - 8.0 g/dL 7.5   Alkaline Phosphatase Latest Ref Range: 45 - 120 U/L 113   Bilirubin, Total Latest Ref Range: 0.0  - 1.0 mg/dL 0.4   Troponin I Latest Ref Range: 0.00 - 0.29 ng/mL <0.01   Glucose Latest Ref Range: 70 - 125 mg/dL 90     Results for ANN LABOY (MRN [id number]) as of 12/21/2020 14:46   Ref. Range 12/14/2020 18:37   WBC Latest Ref Range: 4.0 - 11.0 thou/uL 8.0   RBC Latest Ref Range: 3.80 - 5.40 mill/uL 4.41   Hemoglobin Latest Ref Range: 12.0 - 16.0 g/dL 13.4   Hematocrit Latest Ref Range: 35.0 - 47.0 % 39.9   MCV Latest Ref Range: 80 - 100 fL 91   MCH Latest Ref Range: 27.0 - 34.0 pg 30.4   MCHC Latest Ref Range: 32.0 - 36.0 g/dL 33.6   RDW Latest Ref Range: 11.0 - 14.5 % 12.8   Platelets Latest Ref Range: 140 - 440 thou/uL 342     Patient describes being a worrier by nature.  She lives in [place], so this clinic is more convenient.  She was on antidepressant long time ago, Effexor, which affected her sleep [15 years ago].  She scored 10 on the PHQ-9 today, and 7 on the OSWALDO-7.    She has had 6 steroid shots in her back for herniated disc.  She just had a steroid shot in her left knee, Monday, December 14 [orthopedics, when she was seen in the emergency department, at that time she had a blood pressure 240 systolic].  This coming Wednesday she will be having an injection in her right hip.    She is feeling dizzy when she gets out of bed, possibly gabapentin is causing this.  The gabapentin has helped her back pain.  She has been taking meclizine for dizziness, and has been taking this at nighttime.    She is taking pramipexole for restless leg syndrome and this has helped.    Patient takes Aleve 4-6 a day depending on her pain.    For her hypertension, she has a blood pressure cuff at home, however she does not use this, as her blood pressure goes up the more she uses it.  Her hydrochlorothiazide was increased to 25 mg 3 weeks ago.    She has low back pain, more severe on the right side, also right groin pain, and down the back and upper part of her leg.  Also aches in her muscles of her legs.  She is currently  taking 600 mg of gabapentin in the morning, 300 mg in the middle of the day, and 300 mg at nighttime.  Goes to bed at 7 PM, watches TV an hour or 2 before she falls asleep.  She wakes up at 5 AM or 7 AM, and in the middle of the night to get up to go to the bathroom.  She feels dizzy if she gets up to go to the bathroom.  Change in position also causes her to be more dizzy.  Gabapentin was 600 mg 3 times a day but she has cut back on this.    The following portions of the patient's history were reviewed and updated as appropriate:     She  has a past medical history of Essential hypertension, Osteoarthritis of spine with radiculopathy, lumbar region, Right hip pain, Spinal stenosis of lumbar region with neurogenic claudication, and Tendinitis of both feet.     She does not have any pertinent problems on file.     She  has a past surgical history that includes Total abdominal hysterectomy.     Her family history is not on file.   Social History     Social History Narrative    Sister had uterine cancer at age 60,  at age 68. Brother had prostate cancer, almost 80 now, doing well. Mother had breast cancer at age 82, dies form something else. Father heart disease (heart surgery), maternal and paternal uncles high blood pressure. One uncle  on the table, was undergoing heart surgery.        Used to smoke, quit 30 years ago. On occasion alcohol.    She lives on her own. Retired.    Dad was an alcoholic.     She  reports that she has never smoked. She has never used smokeless tobacco. She reports current alcohol use. She reports that she does not use drugs.  Rare alcohol use.    Current Outpatient Medications   Medication Sig Refill   ? atorvastatin (LIPITOR) 10 MG tablet Take 10 mg by mouth daily.    ? hydroCHLOROthiazide (MICROZIDE) 12.5 mg capsule Take 2 capsules by mouth daily.    ? labetaloL (TRANDATE; NORMODYNE) 100 MG tablet 2 tabs BID Since her hysterectomy.    ? pramipexole (MIRAPEX) 0.125 MG tablet TAKE 1  TABLET BY MOUTH AT BEDTIME    ? gabapentin (NEURONTIN) 300 MG capsule Take 1 capsule (300 mg total) by mouth 3 (three) times a day.  Patient was taking 600 mg three times a day. She started cutting this back over the last week because of vertigo.     No current facility-administered medications for this visit.    .    Review of Systems        /86   Pulse 67   Wt 166 lb (75.3 kg)   SpO2 98%   BMI 28.49 kg/m      Objective:    Physical Exam          Total Time: 45 . I spent 45minutes face to face with the patient with more than 50% spent on counseling regarding back pain, hip pain, leg pain, dizziness, and hypertension.

## 2021-07-04 NOTE — ADDENDUM NOTE
Addendum Note by Marva Hunter RN at 6/10/2021  9:32 AM     Author: Marva Hunter RN Service: -- Author Type: Registered Nurse    Filed: 6/10/2021  9:32 AM Encounter Date: 6/9/2021 Status: Signed    : Marva Hunter RN (Registered Nurse)    Addended by: MARVA HUNTER on: 6/10/2021 09:32 AM        Modules accepted: Orders         Attempt #2 to contact patient. Left message advising call back or call to PCP office if having continued symptoms/concerns. Advised more urgent evaluation if symptoms severe.

## 2021-07-04 NOTE — LETTER
Letter by Dwight Ricci MD at      Author: Dwight Ricci MD Service: -- Author Type: --    Filed:  Encounter Date: 6/2/2021 Status: (Other)         Andree Trujillo  [address]  [place] [address]             June 2, 2021         Dear Ms. Ronnie,    Below are the results from your recent visit: As discussed there is a mass in the left lung which is highly suspicious for lung cancer, I did place a referral you should be hearing from them quite soon for further evaluation and treatment.    Resulted Orders   CT Chest With Contrast    Narrative    EXAM: CT CHEST W CONTRAST  LOCATION: [facility]  DATE/TIME: 6/1/2021 6:37 PM    INDICATION: ct massey 5/12 showed large masslike consoliation L lung  COMPARISON: None.  TECHNIQUE: CT chest with IV contrast. Multiplanar reformats were obtained. Dose reduction techniques were used.    CONTRAST: Iopamidol (Isovue-370) 100mL    FINDINGS:   LUNGS AND PLEURA: Necrotic appearing spiculated 4.2 x 3.8 x 4.8 cm left lower lobe mass is redemonstrated. Surrounding groundglass density has resolved. There are numerous tiny satellite nodules inferiorly and posteriorly. The right lung is clear. No   pleural effusion or pneumothorax.    MEDIASTINUM/AXILLAE: Enlarged left hilar lymph nodes have decreased in size slightly. No abnormally enlarged mediastinal lymph nodes. Great vessels normal in caliber. Cardiac chambers unremarkable. No pericardial effusion.    CORONARY ARTERY CALCIFICATION: None.    UPPER ABDOMEN: Myriad small hypodense lesions throughout the liver likely cysts, most too small to characterize, however, stable.    MUSCULOSKELETAL: No suspicious bone lesion.      Impression    1.  Slight interval decrease in size of necrotic appearing left lower lobe mass. Morphologically the lesion is highly suspicious for lung cancer. Apparent decrease in overall size, resolved surrounding groundglass density, and it normalization of right   hilar lymph nodes  may be secondary to treatment of superinfection of the mass.             Please call with questions or contact us using MyChart.    Sincerely,        Electronically signed by Dwight Ricci MD

## 2021-07-06 VITALS
OXYGEN SATURATION: 96 % | WEIGHT: 164 LBS | HEIGHT: 64 IN | HEART RATE: 97 BPM | BODY MASS INDEX: 28 KG/M2 | DIASTOLIC BLOOD PRESSURE: 88 MMHG | SYSTOLIC BLOOD PRESSURE: 122 MMHG

## 2021-07-06 VITALS
HEART RATE: 78 BPM | DIASTOLIC BLOOD PRESSURE: 82 MMHG | WEIGHT: 166 LBS | OXYGEN SATURATION: 98 % | SYSTOLIC BLOOD PRESSURE: 138 MMHG | HEIGHT: 64 IN | BODY MASS INDEX: 28.34 KG/M2

## 2021-07-06 ASSESSMENT — PATIENT HEALTH QUESTIONNAIRE - PHQ9: SUM OF ALL RESPONSES TO PHQ QUESTIONS 1-9: 10

## 2021-07-07 NOTE — PATIENT INSTRUCTIONS - HE
1. Take Gabapentin 300 mg every night, and after 5 days, if you still have pain during the day, add in 300 mg every morning as well. It works better when you take it regularly.  2. If the Gabapentin worsens your dizziness let me know.

## 2021-07-11 NOTE — PROGRESS NOTES
Progress Notes by Yani Miller MD at 6/28/2021 10:40 AM     Author: Yani Miller MD Service: -- Author Type: Physician    Filed: 7/11/2021  7:44 AM Encounter Date: 6/28/2021 Status: Signed    : Yani Miller MD (Physician)         Assessment & Plan     Andree was seen today for follow-up.    Diagnoses and all orders for this visit:    Primary osteoarthritis involving multiple joints, as below.  -     XR Lumbar Spine 2 or 3 VWS; Future  -     XR Thoracic Spine 2 VWS; Future  -     XR Cervical Spine 4 - 5 VWS; Future    Essential hypertension  -     metoprolol succinate (TOPROL-XL) 100 MG 24 hr tablet; Take 1 tablet (100 mg total) by mouth daily.    Chronic right-sided low back pain with right-sided sciatica, no other concerning symptoms, or signs.  Loosening, as described below we will send patient for x-ray of her lumbar spine.  Patient has been on gabapentin 300 mg twice daily, plan to continue with this.  Description as below under HPI.  -     gabapentin (NEURONTIN) 300 MG capsule; Take 1 capsule (300 mg total) by mouth 2 (two) times a day.    Neck pain, midline neck pain  -     XR Cervical Spine 4 - 5 VWS; Future    Upper back pain, Patient is complaining of pain in her upper back, across her upper back.  -     XR Thoracic Spine 2 VWS; Future    Acute midline low back pain with bilateral sciatica, Patient is complaining of lower back pain with some radiation down her legs.  -     XR Lumbar Spine 2 or 3 VWS; Future    Diabetes mellitus, patient is on Metformin 500 mg 3 times a day.    Patient was seen in the urgency room, May 12, 2021, was coughing up blood she says, was tired, and had pressure on her chest and was sleeping all the time.  She was given levofloxacin 750 mg daily for 14 days by pulmonary as discussed below.  Patient has an allergy to clindamycin.       33 minutes spent on the date of the encounter doing chart review, review of outside records, review of  "test results, patient visit and documentation, as detailed  {   BMI:   Estimated body mass index is 28.49 kg/m  as calculated from the following:    Height as of this encounter: 5' 4\" (1.626 m).    Weight as of this encounter: 166 lb (75.3 kg).     Return in about 3 months (around 9/28/2021), or Follow with Dr. Miller.    Yani Miller MD  [facility]   Andree Trujillo is 70 y.o. and presents today for the following health issues     HPI   Follow-up blood pressure.    I saw the patient last March 17, 2021:    Essential hypertension, patient is on a small dose of hydrochlorothiazide 12.5 mg daily.  Blood pressure is actually on the low side at 110/70.  Will decide if she needs to continue with this.  -     Basic Metabolic Panel     Postural dizziness with presyncope, has been an issue for some time.  I have been cutting back on her gabapentin, to see if this might be the cause of her dizziness.  This may have helped a little bit, but she still continues to have dizziness.  She is currently on 300 mg in the morning [does not have dizziness when she takes it], and rarely takes this at nighttime, 300 mg if her back is hurting a lot.  -     Ambulatory referral to Cardiology     Chest pressure, at night, ECG done last time February 2021, did not show any ischemia. Patient has dizziness which appears to be postural, worse on getting up from lying down.  Also present when she lies down, and present for 1/2 a minute. TTE: normal. Carotid artery ultrasounds, no significant blockage. Also complains of chest pressure, which doesn't sound particularly cardiac. EKG did not show ischemia.  -     Ambulatory referral to Cardiology     I referred her to cardiology.  There are no upcoming appointment with cardiology in the system.  There was an appointment for April 23, 2021, patient did not attend appointment. Patient says she was sick, and      Patient was seen in the urgency " room, May 12, 2021.  She had a cough.  This was productive of yellow sputum.  Complaining of chills and sweats.  Patient has had 2 pfizer of vaccines.  CT chest was done, which showed a 5 x 5 x 4 cm focus masslike consolidation with central enhancement superior segment left lower lobe laterally.  Recommendation to have follow-up contrast-enhanced CT scan, to make sure that this is not a mass, and pulmonary consultation.  Mildly enlarged 2 x 1.5 cm left hilar lymph node.  Innumerable small hypodense foci throughout the liver, which were present on a previous scan in March 28, 2009, thought to be benign cysts, with slight increase in size.  Patient was sent out with levofloxacin 500 mg daily for 6 days.    Patient was seen by pulmonary medicine, June 7, 2021.  Pulmonology discussed with her antibiotics and follow-up CT scan in 6 weeks, versus bronchoscopy.  Patient wished to proceed with antibiotics and CT scan.  Clindamycin was used, because of penicillin allergy.  She was given clindamycin for 2-4 weeks (patient says that she had itching with Clindamycin and this was switched to Metronidazole 500 mg three times a day, and Levofloxacin 750 mg daily for 14 days. .  Chest CT scan ordered for 6 weeks, and follow-up with pulmonary. Patient is feeling better she says, just stopped 2-3 days ago. Still has some pressure on her chest. Still tired. Cough was pink and also hd some blood, went away with the antibiotics.. Improved.     Back (lower) on Aleve as Tylenol didn't help (2, once a day, sometimes twice a day), and Gabapentin once or twice a day.  Sometimes it goes down her legs, notices this more at night when sleeping. It keeps her from sleeping. If hurting takes one Gabapentin (300 mg) in the morning, and at night if it is hurting, usually only once a day. Has had steroid shots about 6 of them for herniated disc. Has also had a shot in the bursa it sounds like, had hip pain at the time. Has also had knee injections.  "No previous back surgery.     Patient is feeling less dizzy than she was.     Review of Systems  Rest of the review of symptoms is negative. Some shortness of breath on walking she says.       Objective    /90 (Patient Site: Right Arm, Patient Position: Sitting)   Pulse 78   Ht 5' 4\" (1.626 m)   Wt 166 lb (75.3 kg)   SpO2 98%   BMI 28.49 kg/m    Body mass index is 28.49 kg/m .  Physical Exam  Patient alert and interactive, not distressed.  Chest is clear.               "

## 2021-07-14 ENCOUNTER — HOSPITAL ENCOUNTER (OUTPATIENT)
Dept: CT IMAGING | Facility: CLINIC | Age: 71
Discharge: HOME | End: 2021-07-14
Attending: INTERNAL MEDICINE | Admitting: INTERNAL MEDICINE
Payer: COMMERCIAL

## 2021-07-14 DIAGNOSIS — R05.9 COUGH: ICD-10-CM

## 2021-07-14 DIAGNOSIS — J18.9 PNEUMONIA DUE TO INFECTIOUS ORGANISM, UNSPECIFIED LATERALITY, UNSPECIFIED PART OF LUNG: ICD-10-CM

## 2021-07-14 DIAGNOSIS — R91.8 LUNG MASS: ICD-10-CM

## 2021-07-14 PROCEDURE — 71260 CT THORAX DX C+: CPT

## 2021-07-14 PROCEDURE — 250N000011 HC RX IP 250 OP 636: Performed by: INTERNAL MEDICINE

## 2021-07-14 RX ORDER — IOPAMIDOL 755 MG/ML
100 INJECTION, SOLUTION INTRAVASCULAR ONCE
Status: COMPLETED | OUTPATIENT
Start: 2021-07-14 | End: 2021-07-14

## 2021-07-14 RX ADMIN — IOPAMIDOL 100 ML: 755 INJECTION, SOLUTION INTRAVENOUS at 14:32

## 2021-07-15 ENCOUNTER — TELEPHONE (OUTPATIENT)
Dept: ORTHOPEDICS | Facility: CLINIC | Age: 71
End: 2021-07-15
Payer: COMMERCIAL

## 2021-07-15 DIAGNOSIS — M17.12 PRIMARY OSTEOARTHRITIS OF LEFT KNEE: Primary | ICD-10-CM

## 2021-07-15 PROBLEM — M47.26 OSTEOARTHRITIS OF SPINE WITH RADICULOPATHY, LUMBAR REGION: Status: ACTIVE | Noted: 2021-07-15

## 2021-07-15 PROBLEM — H25.10 NUCLEAR SENILE CATARACT: Status: ACTIVE | Noted: 2021-06-07

## 2021-07-15 PROBLEM — M48.062 SPINAL STENOSIS OF LUMBAR REGION WITH NEUROGENIC CLAUDICATION: Status: ACTIVE | Noted: 2021-07-15

## 2021-07-15 PROBLEM — M25.551 RIGHT HIP PAIN: Status: ACTIVE | Noted: 2021-07-15

## 2021-07-15 PROBLEM — M77.8 TENDINITIS OF BOTH FEET: Status: ACTIVE | Noted: 2021-07-15

## 2021-07-15 NOTE — TELEPHONE ENCOUNTER
[facility] Call Center    Phone Message    May a detailed message be left on voicemail: yes     Reason for Call: Other: Patient made appts for Left knee and RIght hip injections, she would prefer on same day if she could have them both done same day,  currently set for a week apart. Patient would like a call back if they can be done same day or to make sure one week apart is ok for injections. Please leave detail message .      Action Taken: Other: [facility]    Travel Screening: Not Applicable

## 2021-07-16 NOTE — TELEPHONE ENCOUNTER
Left knee and right hip pain can be addressed in one visit.  Will keep follow up appointment on schedule until after patient's appointment on 7/26 and likely cancel at that time.    Patient scheduled for appointment on 7/26/21 for discussion of viscosupplementation injection vs steroid injection of left knee.      Steroid  injection last completed 12/14/20 by Dr Vergara.       Prior authorization referral for SynviscOne injection pended.     Please advise.      Mo Scott ATC

## 2021-07-20 ENCOUNTER — OFFICE VISIT (OUTPATIENT)
Dept: PULMONOLOGY | Facility: OTHER | Age: 71
End: 2021-07-20
Payer: COMMERCIAL

## 2021-07-20 VITALS
OXYGEN SATURATION: 97 % | HEART RATE: 80 BPM | SYSTOLIC BLOOD PRESSURE: 130 MMHG | WEIGHT: 169.5 LBS | DIASTOLIC BLOOD PRESSURE: 78 MMHG | BODY MASS INDEX: 29.09 KG/M2

## 2021-07-20 DIAGNOSIS — R91.1 LUNG NODULE: Primary | ICD-10-CM

## 2021-07-20 DIAGNOSIS — J18.9 PNEUMONIA DUE TO INFECTIOUS ORGANISM, UNSPECIFIED LATERALITY, UNSPECIFIED PART OF LUNG: ICD-10-CM

## 2021-07-20 PROCEDURE — 99214 OFFICE O/P EST MOD 30 MIN: CPT | Performed by: INTERNAL MEDICINE

## 2021-07-20 RX ORDER — METOPROLOL SUCCINATE 100 MG/1
100 TABLET, EXTENDED RELEASE ORAL DAILY
COMMUNITY
Start: 2021-04-21 | End: 2021-10-05

## 2021-07-20 RX ORDER — HYDROCHLOROTHIAZIDE 12.5 MG/1
1 CAPSULE ORAL DAILY
COMMUNITY
Start: 2021-06-14 | End: 2022-02-22

## 2021-07-20 RX ORDER — AMLODIPINE BESYLATE 2.5 MG/1
1 TABLET ORAL DAILY
COMMUNITY
Start: 2021-06-14 | End: 2022-02-24

## 2021-07-20 NOTE — PROGRESS NOTES
Pulmonary progress note    Assessment and plan: 70-year-old woman with distant smoking history here with pneumonia and residual lung nodule.  No further antibiotics necessary.  Surveillance CT to monitor continued healing.    Chest CT ordered    Chief complaint: Pneumonia    HPI: 62-year-old woman here for follow-up of pneumonia and lung nodule.  After starting clindamycin she felt much better but had a fair amount of itching.  Then changed to other anaerobic coverage.  She completed her course and is feeling better.  No fever, hemoptysis, sputum production.    Medications and history reviewed    On exam  /78   Pulse 80   Wt 76.9 kg (169 lb 8 oz)   SpO2 97%   BMI 29.09 kg/m    Well-appearing  Neck supple  Lung exam clear without crackles or wheezes  Normal cardiac exam  No clubbing  No anxiety    Chest CT reviewed interpreted by me: Left lower lobe mass is significantly improved, no nodule.

## 2021-07-26 ENCOUNTER — OFFICE VISIT (OUTPATIENT)
Dept: ORTHOPEDICS | Facility: CLINIC | Age: 71
End: 2021-07-26
Payer: COMMERCIAL

## 2021-07-26 VITALS
HEIGHT: 65 IN | DIASTOLIC BLOOD PRESSURE: 89 MMHG | BODY MASS INDEX: 28.16 KG/M2 | SYSTOLIC BLOOD PRESSURE: 180 MMHG | WEIGHT: 169 LBS

## 2021-07-26 DIAGNOSIS — M17.12 PRIMARY OSTEOARTHRITIS OF LEFT KNEE: ICD-10-CM

## 2021-07-26 DIAGNOSIS — M53.3 CHRONIC SACROILIAC JOINT PAIN: ICD-10-CM

## 2021-07-26 DIAGNOSIS — G89.29 CHRONIC BILATERAL LOW BACK PAIN WITHOUT SCIATICA: Primary | ICD-10-CM

## 2021-07-26 DIAGNOSIS — G89.29 CHRONIC SACROILIAC JOINT PAIN: ICD-10-CM

## 2021-07-26 DIAGNOSIS — M16.11 PRIMARY OSTEOARTHRITIS OF RIGHT HIP: ICD-10-CM

## 2021-07-26 DIAGNOSIS — M54.50 CHRONIC BILATERAL LOW BACK PAIN WITHOUT SCIATICA: Primary | ICD-10-CM

## 2021-07-26 PROCEDURE — 20611 DRAIN/INJ JOINT/BURSA W/US: CPT | Mod: 59 | Performed by: FAMILY MEDICINE

## 2021-07-26 PROCEDURE — 99214 OFFICE O/P EST MOD 30 MIN: CPT | Mod: 25 | Performed by: FAMILY MEDICINE

## 2021-07-26 PROCEDURE — 20611 DRAIN/INJ JOINT/BURSA W/US: CPT | Mod: RT | Performed by: FAMILY MEDICINE

## 2021-07-26 RX ADMIN — ROPIVACAINE HYDROCHLORIDE 3 ML: 5 INJECTION, SOLUTION EPIDURAL; INFILTRATION; PERINEURAL at 16:40

## 2021-07-26 RX ADMIN — ROPIVACAINE HYDROCHLORIDE 3 ML: 5 INJECTION, SOLUTION EPIDURAL; INFILTRATION; PERINEURAL at 16:45

## 2021-07-26 RX ADMIN — TRIAMCINOLONE ACETONIDE 40 MG: 40 INJECTION, SUSPENSION INTRA-ARTICULAR; INTRAMUSCULAR at 16:45

## 2021-07-26 RX ADMIN — TRIAMCINOLONE ACETONIDE 40 MG: 40 INJECTION, SUSPENSION INTRA-ARTICULAR; INTRAMUSCULAR at 16:40

## 2021-07-26 ASSESSMENT — MIFFLIN-ST. JEOR: SCORE: 1289.52

## 2021-07-26 NOTE — LETTER
2021         RE: Andree Trujillo  [address] [facility] [place] [address]        Dear Colleague,    Thank you for referring your patient, Andree Trujillo, to the [facility]. Please see a copy of my visit note below.    Andree Trujillo  :  1950  DOS: 2021  MRN: [id number]    Sports Medicine Clinic Visit    PCP: Yani Miller    Andree Trujillo is a 70 year old female who is seen in follow-up presenting with chronic left knee and right hip pain.    Interim History - 2021  Since last visit on 2021 patient has moderate-severe left knee and right hip pain.  Left knee steroid injection completed on 2020 by Sin Navarrete PA-C provided good relief for ~ 3 months.  Right hip intra-articular steroid injection was last completed 21 with good relief for ~ 3 months.  Patient notes that hip and knee pain are worse with walking and going from sit to stand.  She is also noting generalized lower lumbar spine pain with walking and bending at the waist.  Currently treating with OTC pain medication with only mild relief.  No new injury in the interim.    Social History: retired    Review of Systems  Musculoskeletal: as above  Remainder of review of systems is negative including constitutional, CV, pulmonary, GI, Skin and Neurologic except as noted in HPI or medical history.    Past Medical History:   Diagnosis Date     Allergies      Anxiety      Cyst of breast      Essential hypertension      Osteoarthritis of spine with radiculopathy, lumbar region      Osteopenia      Right hip pain      Right knee pain      Spinal stenosis of lumbar region with neurogenic claudication      Tendinitis of both feet      Past Surgical History:   Procedure Laterality Date     HYSTERECTOMY TOTAL ABDOMINAL      11 years ago. Benign ovarian tumor. Everything removed.     HYSTERECTOMY, PAP NO LONGER INDICATED      Total hysterectomy with bilateral oophorectomy due to  "Pelvic pain     Family History   Problem Relation Age of Onset     Breast Cancer Mother      Arthritis Mother      Hypertension Father      Alcohol/Drug Father      Arthritis Father      Cerebrovascular Disease Maternal Grandmother      Cancer Maternal Grandmother      Cancer Maternal Grandfather      Hypertension Paternal Grandfather      Hypertension Brother      Prostate Cancer Brother      Alcohol/Drug Brother      Allergies Brother      Diabetes Paternal Aunt      Uterine Cancer Sister      Coronary Artery Disease No family hx of        Objective  BP (!) 180/89   Ht 1.638 m (5' 4.5\")   Wt 76.7 kg (169 lb)   BMI 28.56 kg/m        General: healthy, alert and in no distress      HEENT: no scleral icterus or conjunctival erythema     Skin: no suspicious lesions or rash. No jaundice.     CV: regular rhythm by palpation, 2+ distal pulses, no pedal edema      Resp: normal respiratory effort without conversational dyspnea     Psych: normal mood and affect      Gait: mildly antalgic, appropriate coordination and balance     Neuro: normal light touch sensory exam of the extremities. Motor strength as noted below       Right hip exam    Inspection:        no edema or ecchymosis in hip area    ROM:       Flexion 110       internal rotation 15      external rotation 30      Range of motion limited by pain    Strength:        flexion 5-/5       extension 5/5       abduction 5-/5       adduction 5-/5    Tender:        greater trochanter       Anterior hip joint       Mild ipsilateral SI joint TTP    Non Tender:        remainder of hip area       illiac crest       ASIS       pubis    Sensation:        grossly intact in hip and thigh    Skin:       well perfused       capillary refill brisk    Special Tests:        neg (-) NATI       positive (+) FADIR       positive (+) scour       neg (-) Albert    Left Knee exam    ROM:        Flexion 130 degrees       Extension -2 degrees       Range of motion limited by pain in terminal " flexion > extension    Inspection:       no visible ecchymosis        effusion noted trace    Skin:       no visible deformities       well perfused       capillary refill brisk    Patellar Motion:        Crepitus noted in the patellofemoral joint    Tender:        lateral patellar border       medial joint line       lateral joint line    Non Tender:         remainder of knee area        along MCL        distal IT Band        infrapatellar tendon        tibial tubercle       pes anserine bursa    Special Tests:        neg (-) varus at 0 deg and 30 deg       neg (-) valgus at 0 deg and 30 deg      Radiology  HIP RIGHT 2-3 VIEWS   12/2/2020 11:05 AM      HISTORY: Hip pain, right.     FINDINGS: Lower lumbar spine degenerative change.                                                                      IMPRESSION:   1. Mild femoral head osteophytosis consistent with early  osteoarthritis.  2. There is a acetabular pincer-type configuration. This can  predispose to femoroacetabular impingement, and clinical correlation  is recommended.    KNEE LEFT THREE VIEWS December 2, 2020 11:04 AM      HISTORY: Left knee pain, unspecified chronicity.                                                                      IMPRESSION:   1. Slight lateral patellar subluxation.  2. I cannot exclude one or two loose bodies in the notch region.  3. No fracture identified.  4. Small asymmetric dense area within the tibial proximal diaphysis.  This could represent a normal variant area of focal trabeculation, but  a small benign enchondroma or chronic bone infarct cannot be excluded.     Large Joint Injection/Arthocentesis: R hip joint    Date/Time: 7/26/2021 4:45 PM  Performed by: Leroy Peacock DO  Authorized by: Leroy Peacock DO     Indications:  Pain, diagnostic evaluation and osteoarthritis  Needle Size:  22 G  Guidance: ultrasound    Approach:  Anterior  Location:  Hip      Site:  R hip joint  Medications:  3 mL  ropivacaine 5 MG/ML; 40 mg triamcinolone 40 MG/ML  Outcome:  Tolerated well, no immediate complications  Procedure discussed: discussed risks, benefits, and alternatives    Consent Given by:  Patient  Timeout: timeout called immediately prior to procedure    Prep: patient was prepped and draped in usual sterile fashion     4 ml's of 1% lidocaine was used as local anesthetic prior to injection  Large Joint Injection/Arthocentesis: L knee joint    Date/Time: 7/26/2021 4:40 PM  Performed by: Leroy Peacock DO  Authorized by: Leroy Peacock DO     Indications:  Pain and osteoarthritis  Needle Size:  22 G  Guidance: ultrasound    Approach:  Superolateral  Location:  Knee      Medications:  3 mL ropivacaine 5 MG/ML; 40 mg triamcinolone 40 MG/ML  Outcome:  Tolerated well, no immediate complications  Procedure discussed: discussed risks, benefits, and alternatives    Consent Given by:  Patient  Timeout: timeout called immediately prior to procedure    Prep: patient was prepped and draped in usual sterile fashion        Assessment:  1. Chronic bilateral low back pain without sciatica    2. Chronic sacroiliac joint pain    3. Primary osteoarthritis of right hip    4. Primary osteoarthritis of left knee        Plan:  Discussed the assessment with the patient.  Follow up: As needed based on clinical progress  Will send for consult with pain management group for possible procedure in the low back versus SI joint  She has chronic SI joint pain and these injections were performed in our clinic  She does have right hip joint pain consistent with osteoarthritis and ROBERT seen on x-ray  Mild left knee arthritis as well, likely compensatory relationship between the right hip and left knee, reviewed in detail today  XR images independently visualized and reviewed with patient today in clinic  Physical therapy options and low impact impact activity strategies to build core strength reviewed in detail today  Trial of  ultrasound-guided right hip joint and left knee joint injections today  Reviewed the diagnostic and therapeutic goals of these injections as well as the risks and limitations, desire to limit over time  Often 2-3 days for steroid effect, and can take up to two weeks for maximum effect  We discussed modified progressive pain-free activity as tolerated  Do not overuse in first two weeks if feeling better due to concern for vulnerability while steroid is working  Supportive care reviewed  All questions were answered today  Contact us with additional questions or concerns  Signs and sx of concern reviewed      Leroy Peacock DO, MANI  Sports Medicine Physician  [facility]        Time spent in chart review, one-on-one evaluation, discussion with patient regarding nature of problem, course, prior treatments, and therapeutic options, share-decision making, procedures and referrals as appropriate/documented, and charting related to the visit: 33 minutes      Disclaimer: This note consists of symbols derived from keyboarding, dictation and/or voice recognition software. As a result, there may be errors in the script that have gone undetected. Please consider this when interpreting information found in this chart.      Again, thank you for allowing me to participate in the care of your patient.        Sincerely,        Leroy Peacock DO

## 2021-07-26 NOTE — PROGRESS NOTES
Andere Trujillo  :  1950  DOS: 2021  MRN: [id number]    Sports Medicine Clinic Visit    PCP: Yani Miller    Andree Trujillo is a 70 year old female who is seen in follow-up presenting with chronic left knee and right hip pain.    Interim History - 2021  Since last visit on 2021 patient has moderate-severe left knee and right hip pain.  Left knee steroid injection completed on 2020 by Sin Navarrete PA-C provided good relief for ~ 3 months.  Right hip intra-articular steroid injection was last completed 21 with good relief for ~ 3 months.  Patient notes that hip and knee pain are worse with walking and going from sit to stand.  She is also noting generalized lower lumbar spine pain with walking and bending at the waist.  Currently treating with OTC pain medication with only mild relief.  No new injury in the interim.    Social History: retired    Review of Systems  Musculoskeletal: as above  Remainder of review of systems is negative including constitutional, CV, pulmonary, GI, Skin and Neurologic except as noted in HPI or medical history.    Past Medical History:   Diagnosis Date     Allergies      Anxiety      Cyst of breast      Essential hypertension      Osteoarthritis of spine with radiculopathy, lumbar region      Osteopenia      Right hip pain      Right knee pain      Spinal stenosis of lumbar region with neurogenic claudication      Tendinitis of both feet      Past Surgical History:   Procedure Laterality Date     HYSTERECTOMY TOTAL ABDOMINAL      11 years ago. Benign ovarian tumor. Everything removed.     HYSTERECTOMY, PAP NO LONGER INDICATED      Total hysterectomy with bilateral oophorectomy due to Pelvic pain     Family History   Problem Relation Age of Onset     Breast Cancer Mother      Arthritis Mother      Hypertension Father      Alcohol/Drug Father      Arthritis Father      Cerebrovascular Disease Maternal Grandmother      Cancer Maternal Grandmother   "    Cancer Maternal Grandfather      Hypertension Paternal Grandfather      Hypertension Brother      Prostate Cancer Brother      Alcohol/Drug Brother      Allergies Brother      Diabetes Paternal Aunt      Uterine Cancer Sister      Coronary Artery Disease No family hx of        Objective  BP (!) 180/89   Ht 1.638 m (5' 4.5\")   Wt 76.7 kg (169 lb)   BMI 28.56 kg/m        General: healthy, alert and in no distress      HEENT: no scleral icterus or conjunctival erythema     Skin: no suspicious lesions or rash. No jaundice.     CV: regular rhythm by palpation, 2+ distal pulses, no pedal edema      Resp: normal respiratory effort without conversational dyspnea     Psych: normal mood and affect      Gait: mildly antalgic, appropriate coordination and balance     Neuro: normal light touch sensory exam of the extremities. Motor strength as noted below       Right hip exam    Inspection:        no edema or ecchymosis in hip area    ROM:       Flexion 110       internal rotation 15      external rotation 30      Range of motion limited by pain    Strength:        flexion 5-/5       extension 5/5       abduction 5-/5       adduction 5-/5    Tender:        greater trochanter       Anterior hip joint       Mild ipsilateral SI joint TTP    Non Tender:        remainder of hip area       illiac crest       ASIS       pubis    Sensation:        grossly intact in hip and thigh    Skin:       well perfused       capillary refill brisk    Special Tests:        neg (-) NATI       positive (+) FADIR       positive (+) scour       neg (-) Albert    Left Knee exam    ROM:        Flexion 130 degrees       Extension -2 degrees       Range of motion limited by pain in terminal flexion > extension    Inspection:       no visible ecchymosis        effusion noted trace    Skin:       no visible deformities       well perfused       capillary refill brisk    Patellar Motion:        Crepitus noted in the patellofemoral joint    Tender:        " lateral patellar border       medial joint line       lateral joint line    Non Tender:         remainder of knee area        along MCL        distal IT Band        infrapatellar tendon        tibial tubercle       pes anserine bursa    Special Tests:        neg (-) varus at 0 deg and 30 deg       neg (-) valgus at 0 deg and 30 deg      Radiology  HIP RIGHT 2-3 VIEWS   12/2/2020 11:05 AM      HISTORY: Hip pain, right.     FINDINGS: Lower lumbar spine degenerative change.                                                                      IMPRESSION:   1. Mild femoral head osteophytosis consistent with early  osteoarthritis.  2. There is a acetabular pincer-type configuration. This can  predispose to femoroacetabular impingement, and clinical correlation  is recommended.    KNEE LEFT THREE VIEWS December 2, 2020 11:04 AM      HISTORY: Left knee pain, unspecified chronicity.                                                                      IMPRESSION:   1. Slight lateral patellar subluxation.  2. I cannot exclude one or two loose bodies in the notch region.  3. No fracture identified.  4. Small asymmetric dense area within the tibial proximal diaphysis.  This could represent a normal variant area of focal trabeculation, but  a small benign enchondroma or chronic bone infarct cannot be excluded.     Large Joint Injection/Arthocentesis: R hip joint    Date/Time: 7/26/2021 4:45 PM  Performed by: Leroy Peacock DO  Authorized by: Leroy Peacock DO     Indications:  Pain, diagnostic evaluation and osteoarthritis  Needle Size:  22 G  Guidance: ultrasound    Approach:  Anterior  Location:  Hip      Site:  R hip joint  Medications:  3 mL ropivacaine 5 MG/ML; 40 mg triamcinolone 40 MG/ML  Outcome:  Tolerated well, no immediate complications  Procedure discussed: discussed risks, benefits, and alternatives    Consent Given by:  Patient  Timeout: timeout called immediately prior to procedure    Prep:  patient was prepped and draped in usual sterile fashion     4 ml's of 1% lidocaine was used as local anesthetic prior to injection  Large Joint Injection/Arthocentesis: L knee joint    Date/Time: 7/26/2021 4:40 PM  Performed by: Leroy Peacock DO  Authorized by: Leroy Peacock DO     Indications:  Pain and osteoarthritis  Needle Size:  22 G  Guidance: ultrasound    Approach:  Superolateral  Location:  Knee      Medications:  3 mL ropivacaine 5 MG/ML; 40 mg triamcinolone 40 MG/ML  Outcome:  Tolerated well, no immediate complications  Procedure discussed: discussed risks, benefits, and alternatives    Consent Given by:  Patient  Timeout: timeout called immediately prior to procedure    Prep: patient was prepped and draped in usual sterile fashion        Assessment:  1. Chronic bilateral low back pain without sciatica    2. Chronic sacroiliac joint pain    3. Primary osteoarthritis of right hip    4. Primary osteoarthritis of left knee        Plan:  Discussed the assessment with the patient.  Follow up: As needed based on clinical progress  Will send for consult with pain management group for possible procedure in the low back versus SI joint  She has chronic SI joint pain and these injections were performed in our clinic  She does have right hip joint pain consistent with osteoarthritis and ROBERT seen on x-ray  Mild left knee arthritis as well, likely compensatory relationship between the right hip and left knee, reviewed in detail today  XR images independently visualized and reviewed with patient today in clinic  Physical therapy options and low impact impact activity strategies to build core strength reviewed in detail today  Trial of ultrasound-guided right hip joint and left knee joint injections today  Reviewed the diagnostic and therapeutic goals of these injections as well as the risks and limitations, desire to limit over time  Often 2-3 days for steroid effect, and can take up to two weeks  for maximum effect  We discussed modified progressive pain-free activity as tolerated  Do not overuse in first two weeks if feeling better due to concern for vulnerability while steroid is working  Supportive care reviewed  All questions were answered today  Contact us with additional questions or concerns  Signs and sx of concern reviewed      Leroy Peacock DO, MANI  Sports Medicine Physician  [facility]        Time spent in chart review, one-on-one evaluation, discussion with patient regarding nature of problem, course, prior treatments, and therapeutic options, share-decision making, procedures and referrals as appropriate/documented, and charting related to the visit: 33 minutes      Disclaimer: This note consists of symbols derived from keyboarding, dictation and/or voice recognition software. As a result, there may be errors in the script that have gone undetected. Please consider this when interpreting information found in this chart.

## 2021-08-02 RX ORDER — ROPIVACAINE HYDROCHLORIDE 5 MG/ML
3 INJECTION, SOLUTION EPIDURAL; INFILTRATION; PERINEURAL
Status: DISCONTINUED | OUTPATIENT
Start: 2021-07-26 | End: 2022-03-11

## 2021-08-02 RX ORDER — TRIAMCINOLONE ACETONIDE 40 MG/ML
40 INJECTION, SUSPENSION INTRA-ARTICULAR; INTRAMUSCULAR
Status: DISCONTINUED | OUTPATIENT
Start: 2021-07-26 | End: 2022-03-11

## 2021-08-30 ENCOUNTER — OFFICE VISIT (OUTPATIENT)
Dept: PALLIATIVE MEDICINE | Facility: CLINIC | Age: 71
End: 2021-08-30
Payer: COMMERCIAL

## 2021-08-30 VITALS — HEART RATE: 72 BPM | DIASTOLIC BLOOD PRESSURE: 88 MMHG | SYSTOLIC BLOOD PRESSURE: 157 MMHG

## 2021-08-30 DIAGNOSIS — G89.29 CHRONIC SACROILIAC JOINT PAIN: ICD-10-CM

## 2021-08-30 DIAGNOSIS — M54.16 LUMBAR RADICULOPATHY: ICD-10-CM

## 2021-08-30 DIAGNOSIS — M53.3 SACROILIAC JOINT DYSFUNCTION: Primary | ICD-10-CM

## 2021-08-30 DIAGNOSIS — M79.18 MYOFASCIAL MUSCLE PAIN: ICD-10-CM

## 2021-08-30 DIAGNOSIS — M53.3 CHRONIC SACROILIAC JOINT PAIN: ICD-10-CM

## 2021-08-30 DIAGNOSIS — G89.29 CHRONIC BILATERAL LOW BACK PAIN WITHOUT SCIATICA: ICD-10-CM

## 2021-08-30 DIAGNOSIS — M54.50 CHRONIC BILATERAL LOW BACK PAIN WITHOUT SCIATICA: ICD-10-CM

## 2021-08-30 PROCEDURE — 99204 OFFICE O/P NEW MOD 45 MIN: CPT | Performed by: PAIN MEDICINE

## 2021-08-30 ASSESSMENT — PAIN SCALES - GENERAL: PAINLEVEL: NO PAIN (1)

## 2021-08-30 NOTE — PATIENT INSTRUCTIONS
- Further procedures recommended:    - ordered right SI joint injection  - Medication Management: no changes   - Physical Therapy: would highly recommend restarting for SI joint dysfunction  - Follow up: for procedure       ----------------------------------------------------------------  Clinic Number:  [phone]     Call with any questions about your care and for scheduling assistance.     Calls are returned Monday through Friday between 8 AM and 4:30 PM. We usually get back to you within 2 business days depending on the issue/request.    If we are prescribing your medications:    For opioid medication refills, call the clinic or send a Myster[employer] message 7 days in advance.  Please include:    Name of requested medication    Name of the pharmacy.    For non-opioid medications, call your pharmacy directly to request a refill. Please allow 3-4 days to be processed.     Per [place] State Law:    All controlled substance prescriptions must be filled within 30 days of being written.      For those controlled substances allowing refills, pickup must occur within 30 days of last fill.      We believe regular attendance is key to your success in our program!      Any time you are unable to keep your appointment we ask that you call us at least 24 hours in advance to cancel.This will allow us to offer the appointment time to another patient.     Multiple missed appointments may lead to dismissal from the clinic.

## 2021-08-30 NOTE — PROGRESS NOTES
[facility]    Date of visit: 8/30/2021    Reason for consultation:    Primary Care Provider is Yani Miller  Pain medications are being prescribed by n/a.    Please see the [facility] health questionnaire which the patient completed and reviewed with me in detail.    Chief Complaint:    Chief Complaint   Patient presents with     Pain     MME prescribed prior to seeing patient:  Current MME:    Pain history:  Andree Trujillo is a 70 year old female who first started having problems with pain   1.5yrs  R>l lbp radiating to her buttocks  Denies any inciting event  Has gotten slightly better with time  The pain is intermittent   Denies numbness/tingling or burning  The pt reports the pain varies in severity   In general is a deep ache  The pain occasionally get a sharp pain over her upper buttocks  The pain is worse with prolonged activity  The pain is worse with prolonged sitting   Benefit with lumbar support  Benefit in certain positions   Benefit with gabapentin   The pt denies any overt weakness  Denies any recent falls  Denies incontinence  Some issues with sleeping although has chronic sleeping issues  Pain sig affects quality of life  Not doing PHYSICAL THERAPY  Anymore because it improved     Of note did discuss possible MEDIAL BRANCH BLOCK    Pain rating: intensity  Averages 5/10 on a 0-10 scale.  *    Current treatments include:  salma    Previous medication treatments included:  n/a    Other treatments have included:  Andree Trujillo has been seen at a pain clinic in the past.  summt  PT: in the past not current  Injections:   SI-possible benefit  Right l4-5 tfes  Right L2,3 tfesi   SI unclear benefit   Past Medical History:  Past Medical History:   Diagnosis Date     Allergies      Anxiety      Cyst of breast      Essential hypertension      Osteoarthritis of spine with radiculopathy, lumbar region      Osteopenia      Right hip pain      Right  knee pain      Spinal stenosis of lumbar region with neurogenic claudication      Tendinitis of both feet      Past Surgical History:  Past Surgical History:   Procedure Laterality Date     HYSTERECTOMY TOTAL ABDOMINAL      11 years ago. Benign ovarian tumor. Everything removed.     HYSTERECTOMY, PAP NO LONGER INDICATED  2009    Total hysterectomy with bilateral oophorectomy due to Pelvic pain     Medications:  Current Outpatient Medications   Medication Sig Dispense Refill     amLODIPine (NORVASC) 2.5 MG tablet 1 tablet daily       ASPIRIN 81 MG OR TABS 1 TABLET DAILY       atorvastatin (LIPITOR) 10 MG tablet Take 1 tablet (10 mg) by mouth daily 90 tablet 3     gabapentin (NEURONTIN) 300 MG capsule TAKE 2 CAPSULES BY MOUTH THREE TIMES DAILY       hydrochlorothiazide (MICROZIDE) 12.5 MG capsule 1 capsule daily       metoprolol succinate ER (TOPROL-XL) 100 MG 24 hr tablet Take 100 mg by mouth daily       Multiple Vitamin (DAILY MULTIVITAMIN PO) Take  by mouth.       pramipexole (MIRAPEX) 0.125 MG tablet TAKE 1 TABLET BY MOUTH AT BEDTIME 90 tablet 0     Allergies:     Allergies   Allergen Reactions     Erythromycin Rash     Clindamycin Itching     Penicillins Rash     Social History:    History of chemical dependency treatment: n    Family history:  Family History   Problem Relation Age of Onset     Breast Cancer Mother      Arthritis Mother      Hypertension Father      Alcohol/Drug Father      Arthritis Father      Cerebrovascular Disease Maternal Grandmother      Cancer Maternal Grandmother      Cancer Maternal Grandfather      Hypertension Paternal Grandfather      Hypertension Brother      Prostate Cancer Brother      Alcohol/Drug Brother      Allergies Brother      Diabetes Paternal Aunt      Uterine Cancer Sister      Coronary Artery Disease No family hx of      Family history of headaches: n    Review of Systems:  Skin: negative  Eyes: negative  Ears/Nose/Throat: negative  Respiratory: No shortness of breath,  dyspnea on exertion, cough, or hemoptysis  Cardiovascular: negative  Gastrointestinal: negative  Genitourinary: negative  Musculoskeletal: negative  Neurologic: negative  Psychiatric: negative  Hematologic/Lymphatic/Immunologic: negative  Endocrine: negative    Physical Exam:  Vitals:    08/30/21 1428   BP: (!) 157/88   Pulse: 72     Exam:  Constitutional: healthy, alert and no distress  Head: normocephalic. Atraumatic.   Eyes: no redness or jaundice noted   ENT: oropharnx normal.  MMM.  Neck supple.    Cardiovascular: Negative JVD  Respiratory: Speaking in full sentences no accessory muscles use     Skin: no suspicious lesions or rashes  Psychiatric: mentation appears normal and affect normal/bright    Musculoskeletal exam:  Gait/Station/Posture: wnl  Cervical spine: ROMwnl       Lumbar spine:     ROM: wnl   Myofascial tenderness:  mild   Morin's: mild               Straight leg exam: neg   NATI/FADIR: neg              SI: ++++++++ on the right   Greater trochanteric bursa: neg  Neurologic exam:  CN:  Cranial nerves 2-12 are normal  Motor:  5/5 LE  Reflexes:       Achilles:  +2    Sensory:  (upper and lower extremities):   Light touch: normal    Allodynia: absent    Dysethesia: absent    Hyperalgesia: absent     Diagnostic tests:  MRI               Assessment/Plan:  Andree Trujillo is a 70 year old female who presents with the complaints of chronic bilateral LBP pain most sig right upper buttocks.   Andree was seen today for pain.    Diagnoses and all orders for this visit:    Sacroiliac joint dysfunction  -     PAIN INJECTION EVAL/TREAT/FOLLOW UP; Future    Chronic bilateral low back pain without sciatica  -     Pain Management Referral    Chronic sacroiliac joint pain  -     Pain Management Referral    Lumbar radiculopathy    Myofascial muscle pain         - Further procedures recommended:    - ordered right SI joint injection  - Medication Management: no changes   - Physical Therapy: would highly recommend  restarting for SI joint dysfunction  - Follow up: for procedure       The total TIME spent on this patient on the day of the appointment was 30 minutes.   Time spent preparing to see the patient (reviewing records and tests)  Time spend face to face with the patient  Time spent ordering tests, medications, procedures and referrals  Time spent Referring and communicating with other healthcare professionals  Documenting clinical information in Epic    Rosendo Clarke MD  [facility]  This note was created with voice recognition software, and while reviewed for accuracy, typos may remain.

## 2021-09-01 ENCOUNTER — TELEPHONE (OUTPATIENT)
Dept: PALLIATIVE MEDICINE | Facility: CLINIC | Age: 71
End: 2021-09-01

## 2021-09-01 NOTE — TELEPHONE ENCOUNTER
Screening Questions for Radiology Injections:    Injection to be done at which interventional clinic site? [facility] [place]    If [facility] location, tell patient that this procedure requires a COVID-19 lab test be done within 4 days of the procedure. Would you still like to move forward with scheduling the procedure?  Not Applicable   If YES, let patient know that someone will call them to schedule the COVID-19 test and that they will only receive a call back if the result is positive. Route to nursing to enter order.     Instruct patient to arrive as directed prior to the scheduled appointment time:    [place] minutes before      Sol: 30 minutes before; if IV needed 1 hour before     Procedure ordered by Tricia    Procedure ordered? right Si      Transforaminal Cervical SAI -  [place] does NOT have providers that do these- [facility] and [facility] do have providers that do    As a reminder, receiving steroids can decrease your body's ability to fight infection.   Would you still like to move forward with scheduling the injection?  Yes    What insurance would patient like us to bill for this procedure? [facility] Medicare      Worker's comp or MVA (motor vehicle accident) -Any injection DO NOT SCHEDULE and route to Mary Roberson.      [email or web address] insurance - For SI joint injections, DO NOT SCHEDULE and route Mary Roberson.       ALL BCBS, [employer] and [employer]-Route to Mary for review DO NOT SCHEDULE      IF SCHEDULING IN [place] AND NEEDS A PA, IT IS OKAY TO SCHEDULE. [place] HANDLES THEIR OWN PA'S AFTER THE PATIENT IS SCHEDULED. PLEASE SCHEDULE AT LEAST 1 WEEK OUT SO A PA CAN BE OBTAINED.    Any chance of pregnancy? NO   If YES, do NOT schedule and route to RN pool    Is an  needed? No     Patient has a drive home? (mandatory) NO - Pt states Dr. Clarke told her she did not need a     Is patient taking any blood thinners (i.e. plavix, coumadin, jantoven,  warfarin, heparin, pradaxa or dabigatran, etc)? No   If hold needed, do NOT schedule, route to RN pool     Is patient taking any aspirin products (includes Excedrin and Fiorinal)? Yes - Pt takes 81mg daily; instructed to hold 0 day(s) prior to procedure.      If more than 325mg/day, OK to schedule; Instruct pt to decrease to less than 325 mg for 7 days AND route to RN pool    For CERVICAL procedures, hold all aspirin products for 6 days.     Tell pt that if aspirin product is not held for 6 days, the procedure WILL BE cancelled.      Does the patient have a bleeding or clotting disorder? No     If YES, okay to schedule AND route to RN nurse pool    For any patients with platelet count <100, must be forwarded to provider    Any allergies to contrast dye, iodine, shellfish, or numbing and steroid medications? No    If YES, add allergy information to appointment notes AND route to the RN pool     If SAI and Contrast Dye / Iodine Allergy? DO NOT SCHEDULE, route to RN pool    Allergies: Erythromycin, Clindamycin, and Penicillins     Is patient diabetic?  No  If YES, instruct them to bring their glucometer.    Does patient have an active infection or treated for one within the past week? No     Is patient currently taking any antibiotics?  No     For patients on chronic, preventative, or prophylactic antibiotics, procedures may be scheduled.     For patients on antibiotics for active or recent infection:antibiotic course must have been completed for 4 days    Is patient currently taking any steroid medications? (i.e. Prednisone, Medrol)  No     For patients on steroid medications, course must have been completed for 4 days    Is patient actively being treated for cancer or immunocompromised? No  If YES, do NOT schedule and route to RN pool     Are you able to get on and off an exam table with minimal or no assistance? Yes  If NO, do NOT schedule and route to RN pool    Are you able to roll over and lay on your stomach  with minimal or no assistance? Yes  If NO, do NOT schedule and route to RN pool     Has the patient had a flu shot or any other vaccinations within 7 days before or after the procedure.  No     Have you recently had a COVID vaccine or have plans to get it in the near future? No    If yes, explain that for the vaccine to work best they need to:       wait 1 week before and 1 week after getting Vaccine #1    wait 1 week before and 2 weeks after getting Vaccine #2    If patient has concerns about the timing, send to RN pool     Does patient have an MRI/CT?  Not Applicable  Check Procedure Scheduling Grid to see if required.      Was the MRI done within the last 3 years?  NA    If yes, where was the MRI done i.e.[facility], [facility], [facility], [facility] etc?       If no, do not schedule and route to RN pool    If MRI was not done at [place], [facility] or [facility] do NOT schedule and route to RN pool.      If pt has an imaging disc, the injection MAY be scheduled but pt has to bring disc to appt.     If they show up without the disc the injection cannot be done    Procedure Specific Instructions:      If celiac plexus block, informed patient NPO for 6 hours and that it is okay to take medications with sips of water, especially blood pressure medications  Not Applicable         If this is for a cervical procedure, informed patient that aspirin needs to be held for 6 days.   Not Applicable      If IV needed:    Do not schedule procedures requiring IV placement in the first appointment of the day or first appointment after lunch. Do NOT schedule at 0745, 0815 or 1245.     Instructed pt to arrive 30 minutes early for IV start if required. (Check Procedure Scheduling Grid)  Not Applicable    Reminders:      If you are started on any steroids or antibiotics between now and your appointment, you must contact us because the procedure may need to be cancelled.  Yes      For all procedures except radiofrequency  ablations (RFAs) and spinal cord stimulator (SCS) trials, informed patient:    IV sedation is not provided for this procedure.  If you feel that an oral anti-anxiety medication is needed, you can discuss this further with your referring provider or primary care provider.  The Pain Clinic provider will discuss specifics of what the procedure includes at your appointment.  Most procedures last 10-20 minutes.  We use numbing medications to help with any discomfort during the procedure.  Not Applicable      For patients 85 or older we recommend having an adult stay w/ them for the remainder of the day.       Does the patient have any questions?  NO  Dian Batista  [facility]

## 2021-09-01 NOTE — TELEPHONE ENCOUNTER
Pt is calling to schedule Right SI joint injection and stated that Dr. Clarke told her that she did not need a .      Per procedure grid Pt does.      Will forward to Dr. Clarke to review and advise if Pt needs  or not.    Leroy Willams RN  Patient Care Supervisor   [facility] [place]

## 2021-09-13 ENCOUNTER — TELEPHONE (OUTPATIENT)
Dept: PALLIATIVE MEDICINE | Facility: CLINIC | Age: 71
End: 2021-09-13
Payer: COMMERCIAL

## 2021-09-13 DIAGNOSIS — M54.16 LUMBAR RADICULOPATHY: Primary | ICD-10-CM

## 2021-09-13 DIAGNOSIS — G89.29 CHRONIC SACROILIAC JOINT PAIN: ICD-10-CM

## 2021-09-13 DIAGNOSIS — M53.3 CHRONIC SACROILIAC JOINT PAIN: ICD-10-CM

## 2021-09-13 NOTE — TELEPHONE ENCOUNTER
Patient called and canceled her injection, she stated she would like Dr. Clarke to order her a MRI        Ilsa Contreras    [facility]

## 2021-09-14 NOTE — TELEPHONE ENCOUNTER
Will forward to Dr. Clarke to review and advise on MRI request     Leroy Willams RN  Patient Care Supervisor   [facility] [place]

## 2021-09-15 NOTE — TELEPHONE ENCOUNTER
Callback to Pt.    Pt wanted to ask how big of an area will be imaged?    Pt stated that she has pain all the way to her tailbone and lots of pain when sitting.     Pt wants lumbar and further down done so she doesn't have to do it twice.     Please advise     Leroy Willams RN  Patient Care Supervisor   [facility] [place]

## 2021-09-15 NOTE — TELEPHONE ENCOUNTER
Called patient to inform her that MRI has been ordered.     Patient has additional questions about the MRI and would like a nurse to call her to discuss.     Tasia Ramirez [facility]   [facility] [place]

## 2021-09-17 DIAGNOSIS — M51.369 DDD (DEGENERATIVE DISC DISEASE), LUMBAR: ICD-10-CM

## 2021-09-17 RX ORDER — GABAPENTIN 300 MG/1
CAPSULE ORAL
Qty: 180 CAPSULE | Refills: 3 | Status: SHIPPED | OUTPATIENT
Start: 2021-09-17 | End: 2022-05-24

## 2021-09-17 NOTE — TELEPHONE ENCOUNTER
Refill Request  Medication name: Pending Prescriptions:                       Disp   Refills    gabapentin (NEURONTIN) 300 MG capsule                       Who prescribed the medication: PCP  Last refill on medication: patient reported in chart   Requested Pharmacy: [facility]  CSA completed Not applicable   checked Unknown  Last appointment with PCP: 6/28/21  Next appointment: unknown

## 2021-09-17 NOTE — TELEPHONE ENCOUNTER
Pt notified.    Pt stated that she is already scheduled for the Lumbar MRI.  She will call the Imaging center back and try to have both MRI's coordinated so these can be done at the same time.    Leroy Willams RN  Patient Care Supervisor   [facility] [place]

## 2021-10-04 ENCOUNTER — HOSPITAL ENCOUNTER (OUTPATIENT)
Dept: MRI IMAGING | Facility: CLINIC | Age: 71
End: 2021-10-04
Attending: PAIN MEDICINE
Payer: COMMERCIAL

## 2021-10-04 DIAGNOSIS — M54.16 LUMBAR RADICULOPATHY: ICD-10-CM

## 2021-10-04 DIAGNOSIS — G89.29 CHRONIC SACROILIAC JOINT PAIN: ICD-10-CM

## 2021-10-04 DIAGNOSIS — M53.3 CHRONIC SACROILIAC JOINT PAIN: ICD-10-CM

## 2021-10-04 PROCEDURE — 72195 MRI PELVIS W/O DYE: CPT

## 2021-10-04 PROCEDURE — 72148 MRI LUMBAR SPINE W/O DYE: CPT

## 2021-10-05 ENCOUNTER — TELEPHONE (OUTPATIENT)
Dept: PALLIATIVE MEDICINE | Facility: CLINIC | Age: 71
End: 2021-10-05

## 2021-10-05 ENCOUNTER — OFFICE VISIT (OUTPATIENT)
Dept: INTERNAL MEDICINE | Facility: CLINIC | Age: 71
End: 2021-10-05
Payer: COMMERCIAL

## 2021-10-05 VITALS
SYSTOLIC BLOOD PRESSURE: 122 MMHG | OXYGEN SATURATION: 96 % | WEIGHT: 173.2 LBS | DIASTOLIC BLOOD PRESSURE: 72 MMHG | HEART RATE: 65 BPM | BODY MASS INDEX: 29.27 KG/M2

## 2021-10-05 DIAGNOSIS — I10 HYPERTENSION GOAL BP (BLOOD PRESSURE) < 140/90: Primary | ICD-10-CM

## 2021-10-05 DIAGNOSIS — E78.00 HYPERCHOLESTEROLEMIA: ICD-10-CM

## 2021-10-05 PROBLEM — M76.61 ACHILLES TENDONITIS, BILATERAL: Status: RESOLVED | Noted: 2017-11-10 | Resolved: 2021-10-05

## 2021-10-05 PROBLEM — M47.26 OSTEOARTHRITIS OF SPINE WITH RADICULOPATHY, LUMBAR REGION: Status: RESOLVED | Noted: 2021-07-15 | Resolved: 2021-10-05

## 2021-10-05 PROBLEM — M76.62 ACHILLES TENDONITIS, BILATERAL: Status: RESOLVED | Noted: 2017-11-10 | Resolved: 2021-10-05

## 2021-10-05 PROBLEM — M25.551 RIGHT HIP PAIN: Status: RESOLVED | Noted: 2021-07-15 | Resolved: 2021-10-05

## 2021-10-05 PROBLEM — M77.8 TENDINITIS OF BOTH FEET: Status: RESOLVED | Noted: 2021-07-15 | Resolved: 2021-10-05

## 2021-10-05 PROBLEM — H61.23 BILATERAL IMPACTED CERUMEN: Status: RESOLVED | Noted: 2017-11-10 | Resolved: 2021-10-05

## 2021-10-05 PROBLEM — H25.10 NUCLEAR SENILE CATARACT: Status: RESOLVED | Noted: 2021-06-07 | Resolved: 2021-10-05

## 2021-10-05 LAB
ALBUMIN SERPL-MCNC: 3.9 G/DL (ref 3.5–5)
ALP SERPL-CCNC: 119 U/L (ref 45–120)
ALT SERPL W P-5'-P-CCNC: 28 U/L (ref 0–45)
ANION GAP SERPL CALCULATED.3IONS-SCNC: 13 MMOL/L (ref 5–18)
AST SERPL W P-5'-P-CCNC: 23 U/L (ref 0–40)
BILIRUB SERPL-MCNC: 0.4 MG/DL (ref 0–1)
BUN SERPL-MCNC: 15 MG/DL (ref 8–22)
CALCIUM SERPL-MCNC: 9.8 MG/DL (ref 8.5–10.5)
CHLORIDE BLD-SCNC: 99 MMOL/L (ref 98–107)
CHOLEST SERPL-MCNC: 157 MG/DL
CO2 SERPL-SCNC: 25 MMOL/L (ref 22–31)
CREAT SERPL-MCNC: 0.74 MG/DL (ref 0.6–1.1)
FASTING STATUS PATIENT QL REPORTED: NORMAL
GFR SERPL CREATININE-BSD FRML MDRD: 84 ML/MIN/1.73M2
GLUCOSE BLD-MCNC: 97 MG/DL (ref 70–125)
HDLC SERPL-MCNC: 69 MG/DL
LDLC SERPL CALC-MCNC: 64 MG/DL
POTASSIUM BLD-SCNC: 3.8 MMOL/L (ref 3.5–5)
PROT SERPL-MCNC: 7.2 G/DL (ref 6–8)
SODIUM SERPL-SCNC: 137 MMOL/L (ref 136–145)
TRIGL SERPL-MCNC: 118 MG/DL

## 2021-10-05 PROCEDURE — 80053 COMPREHEN METABOLIC PANEL: CPT | Performed by: INTERNAL MEDICINE

## 2021-10-05 PROCEDURE — 80061 LIPID PANEL: CPT | Performed by: INTERNAL MEDICINE

## 2021-10-05 PROCEDURE — 36415 COLL VENOUS BLD VENIPUNCTURE: CPT | Performed by: INTERNAL MEDICINE

## 2021-10-05 PROCEDURE — 99214 OFFICE O/P EST MOD 30 MIN: CPT | Performed by: INTERNAL MEDICINE

## 2021-10-05 RX ORDER — METOPROLOL SUCCINATE 100 MG/1
100 TABLET, EXTENDED RELEASE ORAL DAILY
Qty: 90 TABLET | Refills: 3 | Status: SHIPPED | OUTPATIENT
Start: 2021-10-05 | End: 2022-08-16

## 2021-10-05 NOTE — PROGRESS NOTES
Assessment & Plan   Problem List Items Addressed This Visit     Hypercholesterolemia    Relevant Orders    Lipid Profile (Chol, Trig, HDL, LDL calc)    Hypertension goal BP (blood pressure) < 140/90 - Primary    Relevant Medications    metoprolol succinate ER (TOPROL-XL) 100 MG 24 hr tablet    Other Relevant Orders    Comprehensive metabolic panel (BMP + Alb, Alk Phos, ALT, AST, Total. Bili, TP)           #1.  Hypertension, well controlled.  We will continue her current medication regimen.  As noted below, she was mildly hyponatremic in May and we will recheck her CMP today.  If she remains hyponatremic I may stop her hydrochlorothiazide and increase her amlodipine to 5 mg/day.  I will call her with results and further recommendations.    2.  Hyperlipidemia.  Stable.  Check a lipid profile today.    3.  Chronic low back pain, stable.         No follow-ups on file.    Amarjit Barney MD  [facility]    Terri Cervantes is a 70-year-old patient of Dr. Miller who comes in today for follow-up of her chronic medical conditions.  She has hypertension, which is well controlled on 2.5 mg of amlodipine, 12.5 mg of hydrochlorothiazide, and 100 mg of extended release metoprolol.  She denies any chest pain or shortness of breath.  Her last renal function and electrolytes in May of this year showed mild hyponatremia with a sodium of 131 but with a normal creatinine.  As far as her hyperlipidemia is concerned she is currently on 10 mg of Lipitor.  Last lipid profile in March of this year was excellent.  Total cholesterol of 179, LDL of 84.  She suffers with chronic low back pain and is currently seeing our pain clinic for this.    Objective    BP (!) 146/82 (BP Location: Right arm, Patient Position: Sitting, Cuff Size: Adult Large)   Pulse 65   Wt 78.6 kg (173 lb 3.2 oz)   SpO2 96%   BMI 29.27 kg/m    Body mass index is 29.27 kg/m .  Physical Exam

## 2021-10-05 NOTE — LETTER
October 7, 2021      Andree Trujillo  [address] [place] [address]        Dear ,    We are writing to inform you of your test results.    Labs look good Helen.  Let's continue your current medication regimen as-is.    Resulted Orders   Comprehensive metabolic panel (BMP + Alb, Alk Phos, ALT, AST, Total. Bili, TP)   Result Value Ref Range    Sodium 137 136 - 145 mmol/L    Potassium 3.8 3.5 - 5.0 mmol/L    Chloride 99 98 - 107 mmol/L    Carbon Dioxide (CO2) 25 22 - 31 mmol/L    Anion Gap 13 5 - 18 mmol/L    Urea Nitrogen 15 8 - 22 mg/dL    Creatinine 0.74 0.60 - 1.10 mg/dL    Calcium 9.8 8.5 - 10.5 mg/dL    Glucose 97 70 - 125 mg/dL    Alkaline Phosphatase 119 45 - 120 U/L    AST 23 0 - 40 U/L    ALT 28 0 - 45 U/L    Protein Total 7.2 6.0 - 8.0 g/dL    Albumin 3.9 3.5 - 5.0 g/dL    Bilirubin Total 0.4 0.0 - 1.0 mg/dL    GFR Estimate 84 >60 mL/min/1.73m2      Comment:      As of July 11, 2021, eGFR is calculated by the CKD-EPI creatinine equation, without race adjustment. eGFR can be influenced by muscle mass, exercise, and diet. The reported eGFR is an estimation only and is only applicable if the renal function is stable.   Lipid Profile (Chol, Trig, HDL, LDL calc)   Result Value Ref Range    Cholesterol 157 <=199 mg/dL    Triglycerides 118 <=149 mg/dL    Direct Measure HDL 69 >=50 mg/dL      Comment:      HDL Cholesterol Reference Range:     0-2 years:   No reference ranges established for patients under 2 years old  at [facility] for lipid analytes.    2-8 years:  Greater than 45 mg/dL     18 years and older:   Female: Greater than or equal to 50 mg/dL   Male:   Greater than or equal to 40 mg/dL    LDL Cholesterol Calculated 64 <=129 mg/dL    Patient Fasting > 8hrs? Unknown        If you have any questions or concerns, please call the clinic at the number listed above.       Sincerely,      Amarjit Barney MD

## 2021-10-07 ENCOUNTER — TELEPHONE (OUTPATIENT)
Dept: PALLIATIVE MEDICINE | Facility: CLINIC | Age: 71
End: 2021-10-07

## 2021-10-07 NOTE — TELEPHONE ENCOUNTER
Please inform patient I waiting for the MRI of the sacrum to come back before reaching out about the results of her MRI      Most significant findings are at L4-5 where there is moderate right and mild lateral recess narrowing may be responsible for her right-sided symptoms.  Additionally patient did have some arthritic changes in her SI joints.      Depending on which symptoms are affecting her the most would consider a right SI joint injection as discussed in clinic.  If her radiating symptoms are most troublesome I would recommend an L4-5 interlaminar epidural steroid injection.

## 2021-12-03 ENCOUNTER — TELEPHONE (OUTPATIENT)
Dept: PALLIATIVE MEDICINE | Facility: CLINIC | Age: 71
End: 2021-12-03
Payer: COMMERCIAL

## 2021-12-03 DIAGNOSIS — M54.50 CHRONIC BILATERAL LOW BACK PAIN WITHOUT SCIATICA: ICD-10-CM

## 2021-12-03 DIAGNOSIS — M53.3 SACROILIAC JOINT DYSFUNCTION: Primary | ICD-10-CM

## 2021-12-03 DIAGNOSIS — E78.5 HYPERLIPIDEMIA LDL GOAL <100: ICD-10-CM

## 2021-12-03 DIAGNOSIS — G89.29 CHRONIC BILATERAL LOW BACK PAIN WITHOUT SCIATICA: ICD-10-CM

## 2021-12-03 NOTE — TELEPHONE ENCOUNTER
Only finding on sac mri is si arthritis. Please discuss plan in prior note.  Please inform patient I waiting for the MRI of the sacrum to come back before reaching out about the results of her MRI        Most significant findings are at L4-5 where there is moderate right and mild lateral recess narrowing may be responsible for her right-sided symptoms.  Additionally patient did have some arthritic changes in her SI joints.        Depending on which symptoms are affecting her the most would consider a right SI joint injection as discussed in clinic.  If her radiating symptoms are most troublesome I would recommend an L4-5 interlaminar epidural steroid injection

## 2021-12-03 NOTE — TELEPHONE ENCOUNTER
Patient called wondering when someone will call her to go over her MRI results       Ilsa Contreras    [facility]

## 2021-12-03 NOTE — TELEPHONE ENCOUNTER
Ilsa Contreras   Creation Time:  12/3/2021 11:24 AM           Patient called wondering when someone will call her to go over her MRI results         Ilsa Contreras    [facility]       ________________________    Per the 10/7/21 telephone encounter:     Rosendo Clarke MD  10/7/21 9:47 AM  Please inform patient I waiting for the MRI of the sacrum to come back before reaching out about the results of her MRI        Most significant findings are at L4-5 where there is moderate right and mild lateral recess narrowing may be responsible for her right-sided symptoms.  Additionally patient did have some arthritic changes in her SI joints.        Depending on which symptoms are affecting her the most would consider a right SI joint injection as discussed in clinic.  If her radiating symptoms are most troublesome I would recommend an L4-5 interlaminar epidural steroid injection.        Routed to Dr. Clarke to review the 10/4/21 sacra MRI and then nursing/provider can call weston Cardenas RN-BSN  [facility]

## 2021-12-06 RX ORDER — ATORVASTATIN CALCIUM 10 MG/1
TABLET, FILM COATED ORAL
Qty: 90 TABLET | Refills: 0 | OUTPATIENT
Start: 2021-12-06

## 2021-12-07 NOTE — TELEPHONE ENCOUNTER
If wanting to schedule at [facility], the order would need to be routed through our scheduling staff to that team, as the scheduling team is not the same. Wasn't clear if that was the plan given the comment about getting a ride to [place]    We also would need to separate orders, as the get assigned to each procedure, so we would need to ask that a new order be placed for the 2nd one.    Gisela Aldana MD  [facility]

## 2021-12-07 NOTE — TELEPHONE ENCOUNTER
Called Helen xuan she isn't sure which bothers her most, right SI or lower back, she is interested in both injections, let her know would need to be scheduled at least 14 days apart     Pain Rating Today? 1/10 Location of pain? Just below waist on her right side is what really bothers her, when she sits it really bother's her whole buttocks     Asking about location closer to her- [place]   She will also see if possible to get a ride to [place] as she would like to continue with Dr. Rosendo Clarke     Routing to Dr. Linda Aldana for approval for scheduling at [facility]    Infection? Lung infection resolving, have follow-up in January, let patient know infection would need to be resolved before scheduling, she verbalized understanding    Bri Bolaños RN, BSN, CMSRN  RN Care Coordinator  [facility]'

## 2021-12-08 DIAGNOSIS — E78.5 HYPERLIPIDEMIA LDL GOAL <100: ICD-10-CM

## 2021-12-08 NOTE — TELEPHONE ENCOUNTER
Refill Request  Medication name: Pending Prescriptions:                       Disp   Refills    atorvastatin (LIPITOR) 10 MG tablet       90 tab*3            Sig: Take 1 tablet (10 mg) by mouth daily    Who prescribed the medication: Arabella  Last refill on medication: 09/13/21  Requested Pharmacy: [facility]  Last appointment with PCP: 06/28/21  Next appointment: Not due

## 2021-12-09 RX ORDER — ATORVASTATIN CALCIUM 10 MG/1
10 TABLET, FILM COATED ORAL DAILY
Qty: 90 TABLET | Refills: 3 | Status: SHIPPED | OUTPATIENT
Start: 2021-12-09 | End: 2022-11-15

## 2021-12-22 ENCOUNTER — IMMUNIZATION (OUTPATIENT)
Dept: NURSING | Facility: CLINIC | Age: 71
End: 2021-12-22
Payer: COMMERCIAL

## 2021-12-22 PROCEDURE — G0008 ADMIN INFLUENZA VIRUS VAC: HCPCS

## 2021-12-22 PROCEDURE — 0004A PR COVID VAC PFIZER DIL RECON 30 MCG/0.3 ML IM: CPT

## 2021-12-22 PROCEDURE — 91300 PR COVID VAC PFIZER DIL RECON 30 MCG/0.3 ML IM: CPT

## 2021-12-22 PROCEDURE — 90662 IIV NO PRSV INCREASED AG IM: CPT

## 2022-01-20 ENCOUNTER — HOSPITAL ENCOUNTER (OUTPATIENT)
Dept: CT IMAGING | Facility: CLINIC | Age: 72
Discharge: HOME | End: 2022-01-20
Attending: INTERNAL MEDICINE | Admitting: INTERNAL MEDICINE
Payer: COMMERCIAL

## 2022-01-20 DIAGNOSIS — R91.1 LUNG NODULE: ICD-10-CM

## 2022-01-20 PROCEDURE — 71250 CT THORAX DX C-: CPT

## 2022-02-16 ENCOUNTER — OFFICE VISIT (OUTPATIENT)
Dept: PULMONOLOGY | Facility: OTHER | Age: 72
End: 2022-02-16
Payer: COMMERCIAL

## 2022-02-16 VITALS
HEART RATE: 73 BPM | WEIGHT: 182.5 LBS | BODY MASS INDEX: 30.84 KG/M2 | RESPIRATION RATE: 14 BRPM | OXYGEN SATURATION: 98 % | DIASTOLIC BLOOD PRESSURE: 80 MMHG | SYSTOLIC BLOOD PRESSURE: 132 MMHG

## 2022-02-16 DIAGNOSIS — R91.1 LUNG NODULE: Primary | ICD-10-CM

## 2022-02-16 PROCEDURE — 99213 OFFICE O/P EST LOW 20 MIN: CPT | Performed by: INTERNAL MEDICINE

## 2022-02-16 NOTE — PROGRESS NOTES
Pulmonary progress note    Assessment and plan: 70-year-old woman with distant smoking history here with pneumonia and residual lung nodule.  This nodule has essentially resolved to a small area of scarring.  No further CT monitoring needed.    Chief complaint: Lung nodule    HPI: 62-year-old woman here for follow-up of pneumonia and lung nodule.  She is feeling well.  No issues with lung symptoms at all.    Medications and history reviewed    On exam  /80 (BP Location: Left arm, Patient Position: Chair, Cuff Size: Adult Large)   Pulse 73   Resp 14   Wt 82.8 kg (182 lb 8 oz)   SpO2 98%   BMI 30.84 kg/m    Well-appearing  No clubbing  No anxiety    Chest CT reviewed interpreted by me: Left lower lobe mass is significantly improved, no nodule.

## 2022-02-22 DIAGNOSIS — I10 ESSENTIAL HYPERTENSION: Primary | ICD-10-CM

## 2022-02-23 NOTE — TELEPHONE ENCOUNTER
"Outpatient Medication Detail     Disp Refills Start End DONN   amLODIPine (NORVASC) 2.5 MG tablet 90 tablet 3 3/22/2021  No   Sig: TAKE 1 TABLET BY MOUTH ONCE DAILY IN THE MORNING   Sent to pharmacy as: amLODIPine 2.5 mg tablet (NORVASC)   E-Prescribing Status: Receipt confirmed by pharmacy (3/22/2021  4:07 PM CDT)       amLODIPine (NORVASC) 2.5 MG tablet [[id number]]    Electronically signed by: Tameka Jiang RN on 03/22/21 1607 Status: Active   Ordering user: Tameka Jiang RN 03/22/21 1607 Ordering provider: Yani Miller MD   Authorized by: Yani Miller MD   Frequency:  03/22/21 - Until Discontinued Released by: Tameka Jiang RN 03/22/21 1607   Diagnoses  Essential hypertension [I10]     Former patient of Angela & has not established care with another provider.  Please assign refill request to covering provider per clinic standard process.    Routing refill request to provider for review/approval because:  No PCP    Last office visit provider:  10/5/21     Requested Prescriptions   Pending Prescriptions Disp Refills     amLODIPine (NORVASC) 2.5 MG tablet [Pharmacy Med Name: amLODIPine Besylate 2.5 MG Oral Tablet] 90 tablet 0     Sig: TAKE 1 TABLET BY MOUTH ONCE DAILY IN THE MORNING       Calcium Channel Blockers Protocol  Passed - 2/23/2022  9:25 AM        Passed - Blood pressure under 140/90 in past 12 months     BP Readings from Last 3 Encounters:   02/16/22 132/80   10/05/21 122/72   08/30/21 (!) 157/88                 Passed - Recent (12 mo) or future (30 days) visit within the authorizing provider's specialty     Patient has had an office visit with the authorizing provider or a provider within the authorizing providers department within the previous 12 mos or has a future within next 30 days. See \"Patient Info\" tab in inbasket, or \"Choose Columns\" in Meds & Orders section of the refill encounter.              Passed - Medication is active on med list       "  Passed - Patient is age 18 or older        Passed - No active pregnancy on record        Passed - Normal serum creatinine on file in past 12 months     Recent Labs   Lab Test 10/05/21  1404   CR 0.74       Ok to refill medication if creatinine is low          Passed - No positive pregnancy test in past 12 months             Erasto Joy RN 02/23/22 9:25 AM

## 2022-02-24 RX ORDER — AMLODIPINE BESYLATE 2.5 MG/1
TABLET ORAL
Qty: 90 TABLET | Refills: 0 | Status: SHIPPED | OUTPATIENT
Start: 2022-02-24 | End: 2022-05-20

## 2022-03-01 ENCOUNTER — TELEPHONE (OUTPATIENT)
Dept: ORTHOPEDICS | Facility: CLINIC | Age: 72
End: 2022-03-01
Payer: COMMERCIAL

## 2022-03-01 DIAGNOSIS — M17.12 PRIMARY OSTEOARTHRITIS OF LEFT KNEE: Primary | ICD-10-CM

## 2022-03-01 NOTE — TELEPHONE ENCOUNTER
Patient scheduled for appointment on 3/11/2022 for discussion of viscosupplementation injection vs steroid injection of left knee.      Steroid  injection last completed 7/26/2022.       Prior authorization referral for Durolane injection pended.     Please advise.

## 2022-03-03 NOTE — TELEPHONE ENCOUNTER
----- Message from Mary Kay Gonzalez sent at 3/3/2022 11:52 AM CST -----  Regarding: FIOR Doherty,    Would we be able to switch to a preferred product- Synvisc or Euflexxa?    Thank you,  Mary Kay     student

## 2022-03-04 NOTE — PROGRESS NOTES
Andree Trujillo  :  1950  DOS: 3/11/2022  MRN: [id number]    Sports Medicine Clinic Visit    PCP: Yani Miller    Andree Trujillo is a 70 year old female who is seen in follow-up presenting with chronic left knee and right hip pain.    Interim History - 2021  Since last visit on 2021 patient has moderate-severe left knee and right hip pain.  Left knee steroid injection completed on 2020 by Sin Navarrete PA-C provided good relief for ~ 3 months.  Right hip intra-articular steroid injection was last completed 21 with good relief for ~ 3 months.  Patient notes that hip and knee pain are worse with walking and going from sit to stand.  She is also noting generalized lower lumbar spine pain with walking and bending at the waist.  Currently treating with OTC pain medication with only mild relief.  No new injury in the interim.    Social History: retired    Interim History - 2022  Since last visit on 2021 patient has noticed an increase in medial left knee pain.  Left knee corticosteroid injection completed on 2021 provided 6 months of great relief and pain has returned over the past month and a half. She takes Advil. Moments of instability noted and denies swelling and tingling. She is interested in a repeat corticosteroid injection today.  No new injury in the interim.    Review of Systems  Musculoskeletal: as above  Remainder of review of systems is negative including constitutional, CV, pulmonary, GI, Skin and Neurologic except as noted in HPI or medical history.    Past Medical History:   Diagnosis Date     Allergies      Anxiety      Cyst of breast      Essential hypertension      Osteoarthritis of spine with radiculopathy, lumbar region      Osteopenia      Right hip pain      Right knee pain      Spinal stenosis of lumbar region with neurogenic claudication      Tendinitis of both feet      Past Surgical History:   Procedure Laterality Date     HYSTERECTOMY  TOTAL ABDOMINAL      11 years ago. Benign ovarian tumor. Everything removed.     HYSTERECTOMY, PAP NO LONGER INDICATED  2009    Total hysterectomy with bilateral oophorectomy due to Pelvic pain     Family History   Problem Relation Age of Onset     Breast Cancer Mother      Arthritis Mother      Hypertension Father      Alcohol/Drug Father      Arthritis Father      Cerebrovascular Disease Maternal Grandmother      Cancer Maternal Grandmother      Cancer Maternal Grandfather      Hypertension Paternal Grandfather      Hypertension Brother      Prostate Cancer Brother      Alcohol/Drug Brother      Allergies Brother      Diabetes Paternal Aunt      Uterine Cancer Sister      Coronary Artery Disease No family hx of        Objective  BP (!) 163/92   Pulse 67   Wt 83.6 kg (184 lb 4.8 oz)   BMI 31.15 kg/m        General: healthy, alert and in no distress      HEENT: no scleral icterus or conjunctival erythema     Skin: no suspicious lesions or rash. No jaundice.     CV: regular rhythm by palpation, 2+ distal pulses, no pedal edema      Resp: normal respiratory effort without conversational dyspnea     Psych: normal mood and affect      Gait: mildly antalgic, appropriate coordination and balance     Neuro: normal light touch sensory exam of the extremities. Motor strength as noted below     Left Knee exam    ROM:        Flexion 130 degrees       Extension -2 degrees       Range of motion limited by pain in terminal flexion > extension    Inspection:       no visible ecchymosis        effusion noted trace    Skin:       no visible deformities       well perfused       capillary refill brisk    Patellar Motion:        Crepitus noted in the patellofemoral joint    Tender:        lateral patellar border       medial joint line       lateral joint line    Non Tender:         remainder of knee area        along MCL        distal IT Band        infrapatellar tendon        tibial tubercle       pes anserine bursa    Special  Tests:        neg (-) varus at 0 deg and 30 deg       neg (-) valgus at 0 deg and 30 deg      Radiology  HIP RIGHT 2-3 VIEWS   12/2/2020 11:05 AM      HISTORY: Hip pain, right.     FINDINGS: Lower lumbar spine degenerative change.                                                                      IMPRESSION:   1. Mild femoral head osteophytosis consistent with early  osteoarthritis.  2. There is a acetabular pincer-type configuration. This can  predispose to femoroacetabular impingement, and clinical correlation  is recommended.    KNEE LEFT THREE VIEWS December 2, 2020 11:04 AM      HISTORY: Left knee pain, unspecified chronicity.                                                                      IMPRESSION:   1. Slight lateral patellar subluxation.  2. I cannot exclude one or two loose bodies in the notch region.  3. No fracture identified.  4. Small asymmetric dense area within the tibial proximal diaphysis.  This could represent a normal variant area of focal trabeculation, but  a small benign enchondroma or chronic bone infarct cannot be excluded.     Large Joint Injection/Arthocentesis: L knee joint    Date/Time: 3/11/2022 3:30 PM  Performed by: Leroy Peacock DO  Authorized by: Leroy Peacock DO     Indications:  Osteoarthritis  Needle Size:  21 G  Guidance: ultrasound    Approach:  Superolateral  Location:  Knee      Medications:  48 mg hylan 48 MG/6ML  Outcome:  Tolerated well, no immediate complications  Procedure discussed: discussed risks, benefits, and alternatives    Consent Given by:  Patient  Timeout: timeout called immediately prior to procedure    Prep: patient was prepped and draped in usual sterile fashion     Ultrasound images of procedure were permanently stored.      Assessment:  1. Primary osteoarthritis of left knee        Plan:  Discussed the assessment with the patient.  Follow up: As needed based on clinical progress  Left knee arthritis, likely compensatory  component between the right hip and left knee, reviewed in detail again today, hip currently doing well  XR images independently visualized and reviewed with patient today in clinic  Physical therapy options and low impact impact activity strategies to build core strength reviewed in detail today  Reviewed activity modification, low impact strategies, and progressive increase in activity as tolerated and guided by pain  Reviewed options for potential steroid vs viscosupplementation injections and the possibility for future orthopedic referral prn  Trial of US guided viscosupplementation today, can repeat CSI in the future if needed based on clinical progress over the next 4 weeks  Reviewed safe and appropriate OTC medication choices, try tylenol first  Up to 3000mg daily of tylenol is generally safe, NSAID dosing and duration limitations reviewed  Discussed nature of degenerative arthrosis of the knee.   Discussed symptom treatment with ice or heat, topical treatments, and rest if needed.   Expectations and limitations of synvisc were reviewed in detail  Often 4-6 weeks before full effect may be noticed  Usually covered up to every 6 months by insurance, but does not need to be repeated unless pain returns, at which point we would re-evaluate  Potential use of CSI in future for flares of pain reviewed in detail  Encouraged modified progressive pain-free activity as tolerated  HEP and Supportive care reviewed  All questions were answered today  Contact us with additional questions or concerns  Signs and sx of concern reviewed      Leroy Peacock DO, MANI  Sports Medicine Physician  [facility]        Disclaimer: This note consists of symbols derived from keyboarding, dictation and/or voice recognition software. As a result, there may be errors in the script that have gone undetected. Please consider this when interpreting information found in this chart.

## 2022-03-11 ENCOUNTER — OFFICE VISIT (OUTPATIENT)
Dept: ORTHOPEDICS | Facility: CLINIC | Age: 72
End: 2022-03-11
Payer: COMMERCIAL

## 2022-03-11 VITALS
DIASTOLIC BLOOD PRESSURE: 92 MMHG | BODY MASS INDEX: 31.15 KG/M2 | HEART RATE: 67 BPM | SYSTOLIC BLOOD PRESSURE: 163 MMHG | WEIGHT: 184.3 LBS

## 2022-03-11 DIAGNOSIS — M17.12 PRIMARY OSTEOARTHRITIS OF LEFT KNEE: Primary | ICD-10-CM

## 2022-03-11 PROCEDURE — 99213 OFFICE O/P EST LOW 20 MIN: CPT | Mod: 25 | Performed by: FAMILY MEDICINE

## 2022-03-11 PROCEDURE — 20611 DRAIN/INJ JOINT/BURSA W/US: CPT | Mod: LT | Performed by: FAMILY MEDICINE

## 2022-03-11 ASSESSMENT — PAIN SCALES - GENERAL: PAINLEVEL: SEVERE PAIN (6)

## 2022-03-11 NOTE — Clinical Note
3/11/2022         RE: Andree Trujillo  [address] [facility] [place] [address]        Dear Colleague,    Thank you for referring your patient, Andree Trujillo, to the [facility]. Please see a copy of my visit note below.    Andree Trujillo  :  1950  DOS: 3/11/2022  MRN: [id number]    Sports Medicine Clinic Visit    PCP: Yani Miller    Andree Trujillo is a 70 year old female who is seen in follow-up presenting with chronic left knee and right hip pain.    Interim History - 2021  Since last visit on 2021 patient has moderate-severe left knee and right hip pain.  Left knee steroid injection completed on 2020 by Sin Navarrete PA-C provided good relief for ~ 3 months.  Right hip intra-articular steroid injection was last completed 21 with good relief for ~ 3 months.  Patient notes that hip and knee pain are worse with walking and going from sit to stand.  She is also noting generalized lower lumbar spine pain with walking and bending at the waist.  Currently treating with OTC pain medication with only mild relief.  No new injury in the interim.    Social History: retired    Interim History - 2022  Since last visit on 2021 patient has noticed an increase in medial left knee pain.  Left knee corticosteroid injection completed on 2021 provided 6 months of great relief and pain has returned over the past month and a half. She takes Advil. Moments of instability noted and denies swelling and tingling. She is interested in a repeat corticosteroid injection today.  No new injury in the interim.    Review of Systems  Musculoskeletal: as above  Remainder of review of systems is negative including constitutional, CV, pulmonary, GI, Skin and Neurologic except as noted in HPI or medical history.    Past Medical History:   Diagnosis Date     Allergies      Anxiety      Cyst of breast      Essential hypertension      Osteoarthritis of spine  with radiculopathy, lumbar region      Osteopenia      Right hip pain      Right knee pain      Spinal stenosis of lumbar region with neurogenic claudication      Tendinitis of both feet      Past Surgical History:   Procedure Laterality Date     HYSTERECTOMY TOTAL ABDOMINAL      11 years ago. Benign ovarian tumor. Everything removed.     HYSTERECTOMY, PAP NO LONGER INDICATED  2009    Total hysterectomy with bilateral oophorectomy due to Pelvic pain     Family History   Problem Relation Age of Onset     Breast Cancer Mother      Arthritis Mother      Hypertension Father      Alcohol/Drug Father      Arthritis Father      Cerebrovascular Disease Maternal Grandmother      Cancer Maternal Grandmother      Cancer Maternal Grandfather      Hypertension Paternal Grandfather      Hypertension Brother      Prostate Cancer Brother      Alcohol/Drug Brother      Allergies Brother      Diabetes Paternal Aunt      Uterine Cancer Sister      Coronary Artery Disease No family hx of        Objective  BP (!) 163/92   Pulse 67   Wt 83.6 kg (184 lb 4.8 oz)   BMI 31.15 kg/m        General: healthy, alert and in no distress      HEENT: no scleral icterus or conjunctival erythema     Skin: no suspicious lesions or rash. No jaundice.     CV: regular rhythm by palpation, 2+ distal pulses, no pedal edema      Resp: normal respiratory effort without conversational dyspnea     Psych: normal mood and affect      Gait: mildly antalgic, appropriate coordination and balance     Neuro: normal light touch sensory exam of the extremities. Motor strength as noted below     Left Knee exam    ROM:        Flexion 130 degrees       Extension -2 degrees       Range of motion limited by pain in terminal flexion > extension    Inspection:       no visible ecchymosis        effusion noted trace    Skin:       no visible deformities       well perfused       capillary refill brisk    Patellar Motion:        Crepitus noted in the patellofemoral  joint    Tender:        lateral patellar border       medial joint line       lateral joint line    Non Tender:         remainder of knee area        along MCL        distal IT Band        infrapatellar tendon        tibial tubercle       pes anserine bursa    Special Tests:        neg (-) varus at 0 deg and 30 deg       neg (-) valgus at 0 deg and 30 deg      Radiology  HIP RIGHT 2-3 VIEWS   12/2/2020 11:05 AM      HISTORY: Hip pain, right.     FINDINGS: Lower lumbar spine degenerative change.                                                                      IMPRESSION:   1. Mild femoral head osteophytosis consistent with early  osteoarthritis.  2. There is a acetabular pincer-type configuration. This can  predispose to femoroacetabular impingement, and clinical correlation  is recommended.    KNEE LEFT THREE VIEWS December 2, 2020 11:04 AM      HISTORY: Left knee pain, unspecified chronicity.                                                                      IMPRESSION:   1. Slight lateral patellar subluxation.  2. I cannot exclude one or two loose bodies in the notch region.  3. No fracture identified.  4. Small asymmetric dense area within the tibial proximal diaphysis.  This could represent a normal variant area of focal trabeculation, but  a small benign enchondroma or chronic bone infarct cannot be excluded.     Large Joint Injection/Arthocentesis: L knee joint    Date/Time: 3/11/2022 3:30 PM  Performed by: Leroy Peacokc DO  Authorized by: Leroy Peacock DO     Indications:  Osteoarthritis  Needle Size:  21 G  Guidance: ultrasound    Approach:  Superolateral  Location:  Knee      Medications:  48 mg hylan 48 MG/6ML  Outcome:  Tolerated well, no immediate complications  Procedure discussed: discussed risks, benefits, and alternatives    Consent Given by:  Patient  Timeout: timeout called immediately prior to procedure    Prep: patient was prepped and draped in usual sterile fashion      Ultrasound images of procedure were permanently stored.      Assessment:  1. Primary osteoarthritis of left knee        Plan:  Discussed the assessment with the patient.  Follow up: As needed based on clinical progress  Left knee arthritis, likely compensatory component between the right hip and left knee, reviewed in detail again today, hip currently doing well  XR images independently visualized and reviewed with patient today in clinic  Physical therapy options and low impact impact activity strategies to build core strength reviewed in detail today  Reviewed activity modification, low impact strategies, and progressive increase in activity as tolerated and guided by pain  Reviewed options for potential steroid vs viscosupplementation injections and the possibility for future orthopedic referral prn  Trial of US guided viscosupplementation today, can repeat CSI in the future if needed based on clinical progress over the next 4 weeks  Reviewed safe and appropriate OTC medication choices, try tylenol first  Up to 3000mg daily of tylenol is generally safe, NSAID dosing and duration limitations reviewed  Discussed nature of degenerative arthrosis of the knee.   Discussed symptom treatment with ice or heat, topical treatments, and rest if needed.   Expectations and limitations of synvisc were reviewed in detail  Often 4-6 weeks before full effect may be noticed  Usually covered up to every 6 months by insurance, but does not need to be repeated unless pain returns, at which point we would re-evaluate  Potential use of CSI in future for flares of pain reviewed in detail  Encouraged modified progressive pain-free activity as tolerated  HEP and Supportive care reviewed  All questions were answered today  Contact us with additional questions or concerns  Signs and sx of concern reviewed      Leroy Peacock DO, MANI  Sports Medicine Physician  [facility]        Disclaimer: This note  consists of symbols derived from keyboarding, dictation and/or voice recognition software. As a result, there may be errors in the script that have gone undetected. Please consider this when interpreting information found in this chart.      Again, thank you for allowing me to participate in the care of your patient.        Sincerely,        Leroy Peacock, DO

## 2022-03-11 NOTE — TELEPHONE ENCOUNTER
Due to distance from clinic, patient has requested PA for SynviscOne injection (1 time) instead of Euflexxa (3 part series) be placed. Order placed. Kelly Ching, ATC

## 2022-04-26 ENCOUNTER — OFFICE VISIT (OUTPATIENT)
Dept: FAMILY MEDICINE | Facility: CLINIC | Age: 72
End: 2022-04-26
Payer: COMMERCIAL

## 2022-04-26 VITALS
DIASTOLIC BLOOD PRESSURE: 91 MMHG | BODY MASS INDEX: 31.13 KG/M2 | SYSTOLIC BLOOD PRESSURE: 142 MMHG | OXYGEN SATURATION: 98 % | WEIGHT: 184.2 LBS | HEART RATE: 65 BPM

## 2022-04-26 DIAGNOSIS — M54.6 ACUTE MIDLINE THORACIC BACK PAIN: Primary | ICD-10-CM

## 2022-04-26 PROCEDURE — 99213 OFFICE O/P EST LOW 20 MIN: CPT | Performed by: FAMILY MEDICINE

## 2022-04-26 RX ORDER — VALACYCLOVIR HYDROCHLORIDE 1 G/1
1000 TABLET, FILM COATED ORAL 3 TIMES DAILY
Qty: 21 TABLET | Refills: 0 | Status: SHIPPED | OUTPATIENT
Start: 2022-04-26 | End: 2022-12-29

## 2022-04-26 ASSESSMENT — PATIENT HEALTH QUESTIONNAIRE - PHQ9
SUM OF ALL RESPONSES TO PHQ QUESTIONS 1-9: 12
SUM OF ALL RESPONSES TO PHQ QUESTIONS 1-9: 12
10. IF YOU CHECKED OFF ANY PROBLEMS, HOW DIFFICULT HAVE THESE PROBLEMS MADE IT FOR YOU TO DO YOUR WORK, TAKE CARE OF THINGS AT HOME, OR GET ALONG WITH OTHER PEOPLE: SOMEWHAT DIFFICULT

## 2022-04-26 NOTE — PROGRESS NOTES
ASSESSMENT:  (M54.6) Acute midline thoracic back pain  (primary encounter diagnosis)  Comment: Acute onset of mid left back pain, this could be simple muscle spasm though some of the symptoms are suggestive of possible shingles  Plan: valACYclovir (VALTREX) 1000 mg tablet                 PLAN:  1.  Valtrex 1 g 3 times daily x7 days  2.  Sinew symptomatic treatment I recommend the patient use gabapentin teen heat to the area anti-inflammatories and/or Tylenol  3.  If after a week or so the patient is not having any relief of symptoms, she should be reevaluated.       No orders of the defined types were placed in this encounter.    There are no discontinued medications.    No follow-ups on file.    CHIEF COMPLAINT:  chief complaint    SUBJECTIVE:  Andree is a 71 year old female who comes in because about 8 days ago or so she noticed what she describes as a pain or ache begins in the mid back area on the left wraps around into the stomach area, but does not go past the midline.  This was not preceded by any injury or change in her usual level of activity.  She does have a history of a lumbar herniated disc but this feels different than that.  She has noticed a little what she thinks might be a bug bite in the back as well and tends to itch.  The pain is clearly worse at night as not so much an issue during the day.    She has had gabapentin teen but she is not using that consistently she has had that for prior back issue.    I discussed with the patient that there is indeed several small reddish lesions, and that this raises the possibility that this could be shingles.  I told the patient I am not positive about this but the symptoms are at least somewhat consistent with this I would treat this as if it is shingles but I would also continue symptomatic treatment with gabapentin teen Tylenol Motrin heating pad.    REVIEW OF SYSTEMS:      All other systems are negative.    PFSH:  Immunization History   Administered  Date(s) Administered     COVID-19,PF,Pfizer (12+ Yrs) 2021, 2021, 2021     Influenza (H1N1) 2010     Influenza (IIV3) PF 10/12/2009, 2013     Influenza, Quad, High Dose, Pf, 65yr+ (Fluzone HD) 2021     Pneumo Conj 13-V (2010&after) 11/10/2017     Pneumococcal 23 valent 2019     TDAP Vaccine (Adacel) 2011     Social History     Socioeconomic History     Marital status: Single     Spouse name: Not on file     Number of children: Not on file     Years of education: Not on file     Highest education level: Not on file   Occupational History     Not on file   Tobacco Use     Smoking status: Former Smoker     Quit date: 1991     Years since quittin.2     Smokeless tobacco: Never Used   Substance and Sexual Activity     Alcohol use: Yes     Comment: occ     Drug use: Never     Sexual activity: Not Currently     Birth control/protection: Surgical   Other Topics Concern     Parent/sibling w/ CABG, MI or angioplasty before 65F 55M? No      Service No     Blood Transfusions Yes     Comment: in      Caffeine Concern No     Occupational Exposure No     Hobby Hazards No     Sleep Concern No     Stress Concern No     Weight Concern No     Special Diet No     Back Care No     Exercise Yes     Comment: walk     Bike Helmet Not Asked     Comment: na     Seat Belt Yes     Self-Exams No   Social History Narrative    Sister had uterine cancer at age 60,  at age 68. Brother had prostate cancer, almost 80 now, doing well. Mother had breast cancer at age 82, dies form something else. Father heart disease (heart surgery), maternal and paternal uncles high blood press ure. One uncle  on the table, was undergoing heart surgery.    Used to smoke, quit 30 years ago. On occasion alcohol.      Social Determinants of Health     Financial Resource Strain: Not on file   Food Insecurity: Not on file   Transportation Needs: Not on file   Physical Activity: Not on file    Stress: Not on file   Social Connections: Not on file   Intimate Partner Violence: Not on file   Housing Stability: Not on file     Past Medical History:   Diagnosis Date     Allergies      Anxiety      Cyst of breast      Essential hypertension      Osteoarthritis of spine with radiculopathy, lumbar region      Osteopenia      Right hip pain      Right knee pain      Spinal stenosis of lumbar region with neurogenic claudication      Tendinitis of both feet      Family History   Problem Relation Age of Onset     Breast Cancer Mother      Arthritis Mother      Hypertension Father      Alcohol/Drug Father      Arthritis Father      Cerebrovascular Disease Maternal Grandmother      Cancer Maternal Grandmother      Cancer Maternal Grandfather      Hypertension Paternal Grandfather      Hypertension Brother      Prostate Cancer Brother      Alcohol/Drug Brother      Allergies Brother      Diabetes Paternal Aunt      Uterine Cancer Sister      Coronary Artery Disease No family hx of        MEDICATIONS:  Current Outpatient Medications   Medication Sig Dispense Refill     amLODIPine (NORVASC) 2.5 MG tablet TAKE 1 TABLET BY MOUTH ONCE DAILY IN THE MORNING 90 tablet 0     ASPIRIN 81 MG OR TABS 1 TABLET DAILY       atorvastatin (LIPITOR) 10 MG tablet Take 1 tablet (10 mg) by mouth daily 90 tablet 3     gabapentin (NEURONTIN) 300 MG capsule TAKE 2 CAPSULES BY MOUTH THREE TIMES DAILY 180 capsule 3     hydrochlorothiazide (MICROZIDE) 12.5 MG capsule Take 1 capsule by mouth once daily (Patient not taking: Reported on 3/11/2022) 90 capsule 0     metoprolol succinate ER (TOPROL-XL) 100 MG 24 hr tablet Take 1 tablet (100 mg) by mouth daily 90 tablet 3     Multiple Vitamin (DAILY MULTIVITAMIN PO) Take 1 tablet by mouth daily        pramipexole (MIRAPEX) 0.125 MG tablet TAKE 1 TABLET BY MOUTH AT BEDTIME 90 tablet 0       TOBACCO USE:  History   Smoking Status     Former Smoker     Quit date: 1/17/1991   Smokeless Tobacco     Never  Used       VITALS:  There were no vitals filed for this visit.  Wt Readings from Last 3 Encounters:   03/11/22 83.6 kg (184 lb 4.8 oz)   02/16/22 82.8 kg (182 lb 8 oz)   10/05/21 78.6 kg (173 lb 3.2 oz)       PHYSICAL EXAM:  Constitutional:   Reveals a healthy-appearing female.  Vitals: per nursing notes.  Eyes:  EOMs full, PERRL.  Musculoskeletal: No peripheral swelling.  Examination of the mid back reveals that there are 3 small slightly raised red lesions the area the size of a quarter, I do not see any other deformity there is mild diffuse reproducible tenderness in this area.        Neuro:  Alert and oriented. Cranial nerves, motor, sensory exams are intact.  No gross focal deficits.  Psychiatric:  Memory intact, mood appropriate.    QUALITY MEASURES:      DATA REVIEWED:  Answers for HPI/ROS submitted by the patient on 4/26/2022  If you checked off any problems, how difficult have these problems made it for you to do your work, take care of things at home, or get along with other people?: Somewhat difficult  PHQ9 TOTAL SCORE: 12  Your back pain is: new  What do you think is the original cause of your back pain?: not sure  When did you first notice your back pain? : in the last week  How would you describe your back pain? : dull ache, sharp, stabbing  How often do you feel your back pain? : daily  Where is your back pain located? : left middle of back, left side of waist  Where does your back pain spread? : left side of neck  Since you noticed your back pain, how has it changed? : always present, but gets better and worse  Does your back pain interfere with your job?: Not applicable  On a scale of 1-10 (10 being the worst), how strong is your back pain?: 8  What makes your back pain worse? : coughing, lying down  Acupuncture:: not tried  Acetaminophen: not tried  Activity or Exercise: helpful  Chiropractor: not tried  Cold: not tried  Heat: helpful  Massage: not tried  Muscle relaxants : not tried  NSAIDS  (Ibuprofen, Naproxen) : not helpful  Opioids: not tried  Physical Therapy: not tried  Rest: not helpful  TENS Unit: not tried  Topical pain relievers : not tried  Do you see any other healthcare providers for your back pain? : None  How many servings of fruits and vegetables do you eat daily?: 0-1  On average, how many sweetened beverages do you drink each day (Examples: soda, juice, sweet tea, etc.  Do NOT count diet or artificially sweetened beverages)?: 0  How many minutes a day do you exercise enough to make your heart beat faster?: 9 or less  How many days a week do you exercise enough to make your heart beat faster?: 3 or less  How many days per week do you miss taking your medication?: 0

## 2022-04-27 ASSESSMENT — PATIENT HEALTH QUESTIONNAIRE - PHQ9: SUM OF ALL RESPONSES TO PHQ QUESTIONS 1-9: 12

## 2022-05-19 DIAGNOSIS — I10 ESSENTIAL HYPERTENSION: ICD-10-CM

## 2022-05-20 RX ORDER — AMLODIPINE BESYLATE 2.5 MG/1
TABLET ORAL
Qty: 90 TABLET | Refills: 0 | Status: SHIPPED | OUTPATIENT
Start: 2022-05-20 | End: 2022-08-19

## 2022-05-20 NOTE — TELEPHONE ENCOUNTER
"Routing refill request to provider for review/approval because:  bp out of range    Last Written Prescription Date:  2/24/22  Last Fill Quantity: 90,  # refills: 0   Last office visit provider:  4/26/22     Requested Prescriptions   Pending Prescriptions Disp Refills     amLODIPine (NORVASC) 2.5 MG tablet [Pharmacy Med Name: amLODIPine Besylate 2.5 MG Oral Tablet] 90 tablet 0     Sig: TAKE 1 TABLET BY MOUTH ONCE DAILY IN THE MORNING       Calcium Channel Blockers Protocol  Failed - 5/19/2022  9:31 AM        Failed - Blood pressure under 140/90 in past 12 months     BP Readings from Last 3 Encounters:   04/26/22 (!) 142/91   03/11/22 (!) 163/92   02/16/22 132/80                 Passed - Recent (12 mo) or future (30 days) visit within the authorizing provider's specialty     Patient has had an office visit with the authorizing provider or a provider within the authorizing providers department within the previous 12 mos or has a future within next 30 days. See \"Patient Info\" tab in inbasket, or \"Choose Columns\" in Meds & Orders section of the refill encounter.              Passed - Medication is active on med list        Passed - Patient is age 18 or older        Passed - No active pregnancy on record        Passed - Normal serum creatinine on file in past 12 months     Recent Labs   Lab Test 10/05/21  1404   CR 0.74       Ok to refill medication if creatinine is low          Passed - No positive pregnancy test in past 12 months             Bobbi Rand, RN 05/20/22 9:13 AM  "

## 2022-05-24 DIAGNOSIS — G25.81 RESTLESS LEG SYNDROME: ICD-10-CM

## 2022-05-24 DIAGNOSIS — M51.369 DDD (DEGENERATIVE DISC DISEASE), LUMBAR: ICD-10-CM

## 2022-05-24 RX ORDER — PRAMIPEXOLE DIHYDROCHLORIDE 0.12 MG/1
0.12 TABLET ORAL AT BEDTIME
Qty: 90 TABLET | Refills: 1 | Status: SHIPPED | OUTPATIENT
Start: 2022-05-24 | End: 2022-11-14

## 2022-05-24 RX ORDER — GABAPENTIN 300 MG/1
CAPSULE ORAL
Qty: 180 CAPSULE | Refills: 3 | Status: SHIPPED | OUTPATIENT
Start: 2022-05-24 | End: 2022-12-29

## 2022-05-24 NOTE — TELEPHONE ENCOUNTER
Hydrochlorothiazide already refilled. Other two requests pended.  Lor Jaimes CMA ............... 12:04 PM, 05/24/22

## 2022-08-16 DIAGNOSIS — G25.81 RESTLESS LEG SYNDROME: ICD-10-CM

## 2022-08-16 DIAGNOSIS — Z12.11 SCREENING FOR COLON CANCER: ICD-10-CM

## 2022-08-16 DIAGNOSIS — I10 ESSENTIAL HYPERTENSION: ICD-10-CM

## 2022-08-16 DIAGNOSIS — Z12.31 ENCOUNTER FOR SCREENING MAMMOGRAM FOR BREAST CANCER: Primary | ICD-10-CM

## 2022-08-16 DIAGNOSIS — E78.5 HYPERLIPIDEMIA LDL GOAL <100: ICD-10-CM

## 2022-08-16 DIAGNOSIS — I10 HYPERTENSION GOAL BP (BLOOD PRESSURE) < 140/90: ICD-10-CM

## 2022-08-16 RX ORDER — HYDROCHLOROTHIAZIDE 12.5 MG/1
CAPSULE ORAL
Qty: 90 CAPSULE | Refills: 1 | Status: SHIPPED | OUTPATIENT
Start: 2022-08-16 | End: 2023-02-14

## 2022-08-16 RX ORDER — METOPROLOL SUCCINATE 100 MG/1
100 TABLET, EXTENDED RELEASE ORAL DAILY
Qty: 90 TABLET | Refills: 3 | Status: SHIPPED | OUTPATIENT
Start: 2022-08-16 | End: 2023-07-31

## 2022-08-16 RX ORDER — HYDROCHLOROTHIAZIDE 12.5 MG/1
CAPSULE ORAL
Qty: 90 CAPSULE | Refills: 1 | Status: CANCELLED | OUTPATIENT
Start: 2022-08-16

## 2022-08-16 NOTE — TELEPHONE ENCOUNTER
Contacted patient. Patient scheduled with Dr Christensen  For 12/26/22   Would you be able to Bridge the medication for patient ?  Thank You.

## 2022-08-16 NOTE — TELEPHONE ENCOUNTER
Refilled for patient. Ordered mammogram and stool study kit for her as well as appears to be due for these.

## 2022-08-17 DIAGNOSIS — I10 ESSENTIAL HYPERTENSION: ICD-10-CM

## 2022-08-17 RX ORDER — ATORVASTATIN CALCIUM 10 MG/1
10 TABLET, FILM COATED ORAL DAILY
Qty: 90 TABLET | Refills: 3 | OUTPATIENT
Start: 2022-08-17

## 2022-08-17 RX ORDER — PRAMIPEXOLE DIHYDROCHLORIDE 0.12 MG/1
0.12 TABLET ORAL AT BEDTIME
Qty: 90 TABLET | Refills: 1 | OUTPATIENT
Start: 2022-08-17

## 2022-08-17 NOTE — TELEPHONE ENCOUNTER
"Former patient of [facility] & has not established care with another provider.  Please assign refill request to covering provider per clinic standard process.        Routing refill request to provider for review/approval because:  bp out of range    Last Written Prescription Date:  5/20/22  Last Fill Quantity: 90,  # refills: 0   Last office visit provider:   10/5/21    Requested Prescriptions   Pending Prescriptions Disp Refills     amLODIPine (NORVASC) 2.5 MG tablet [Pharmacy Med Name: amLODIPine Besylate 2.5 MG Oral Tablet] 90 tablet 0     Sig: TAKE 1 TABLET BY MOUTH ONCE DAILY IN THE MORNING       Calcium Channel Blockers Protocol  Failed - 8/17/2022  9:31 AM        Failed - Blood pressure under 140/90 in past 12 months     BP Readings from Last 3 Encounters:   04/26/22 (!) 142/91   03/11/22 (!) 163/92   02/16/22 132/80                 Passed - Recent (12 mo) or future (30 days) visit within the authorizing provider's specialty     Patient has had an office visit with the authorizing provider or a provider within the authorizing providers department within the previous 12 mos or has a future within next 30 days. See \"Patient Info\" tab in inbasket, or \"Choose Columns\" in Meds & Orders section of the refill encounter.              Passed - Medication is active on med list        Passed - Patient is age 18 or older        Passed - No active pregnancy on record        Passed - Normal serum creatinine on file in past 12 months     Recent Labs   Lab Test 10/05/21  1404   CR 0.74       Ok to refill medication if creatinine is low          Passed - No positive pregnancy test in past 12 months             Bobbi Rand RN 08/17/22 4:58 PM  "

## 2022-08-19 RX ORDER — AMLODIPINE BESYLATE 2.5 MG/1
TABLET ORAL
Qty: 90 TABLET | Refills: 0 | Status: SHIPPED | OUTPATIENT
Start: 2022-08-19 | End: 2022-11-14

## 2022-11-14 DIAGNOSIS — E78.5 HYPERLIPIDEMIA LDL GOAL <100: ICD-10-CM

## 2022-11-15 RX ORDER — ATORVASTATIN CALCIUM 10 MG/1
10 TABLET, FILM COATED ORAL DAILY
Qty: 90 TABLET | Refills: 3 | Status: SHIPPED | OUTPATIENT
Start: 2022-11-15 | End: 2023-11-08

## 2022-11-15 NOTE — TELEPHONE ENCOUNTER
"Former patient of [facility] & has not established care with another provider.  Please assign refill request to covering provider per clinic standard process.      Routing refill request to provider for review/approval because:  Labs not current:  ldl    Last Written Prescription Date:  12/9/21  Last Fill Quantity: 90,  # refills: 3   Last office visit provider:  4/26/22     Requested Prescriptions   Pending Prescriptions Disp Refills     atorvastatin (LIPITOR) 10 MG tablet 90 tablet 3     Sig: Take 1 tablet (10 mg) by mouth daily       Statins Protocol Failed - 11/14/2022  3:38 PM        Failed - LDL on file in past 12 months     Recent Labs   Lab Test 10/05/21  1404   LDL 64             Failed - Recent (12 mo) or future (30 days) visit within the authorizing provider's specialty     Patient has had an office visit with the authorizing provider or a provider within the authorizing providers department within the previous 12 mos or has a future within next 30 days. See \"Patient Info\" tab in inbasket, or \"Choose Columns\" in Meds & Orders section of the refill encounter.              Passed - No abnormal creatine kinase in past 12 months     No lab results found.             Passed - Medication is active on med list        Passed - Patient is age 18 or older        Passed - No active pregnancy on record        Passed - No positive pregnancy test in past 12 months             Bobbi Rand RN 11/15/22 2:46 PM  "

## 2022-11-28 NOTE — TELEPHONE ENCOUNTER
"QUICK REFERENCE INFORMATION:  The ABCs of the Annual Wellness Visit    Subsequent Medicare Wellness Visit    HEALTH RISK ASSESSMENT    5/4/1931    Recent Hospitalizations:  No hospitalization(s) within the last year..        Current Medical Providers:  Patient Care Team:  Amilcar Mauricio DO as PCP - General (Family Medicine)        Smoking Status:  Social History     Tobacco Use   Smoking Status Never   Smokeless Tobacco Never   Tobacco Comments    caffeine use- \"rare\"       Alcohol Consumption:  Social History     Substance and Sexual Activity   Alcohol Use No       Depression Screen:   PHQ-2/PHQ-9 Depression Screening 11/28/2022   Retired PHQ-9 Total Score -   Retired Total Score -   Little Interest or Pleasure in Doing Things 0-->not at all   Feeling Down, Depressed or Hopeless 0-->not at all   Trouble Falling or Staying Asleep, or Sleeping Too Much -   Feeling Tired or Having Little Energy -   Poor Appetite or Overeating -   Feeling Bad about Yourself - or that You are a Failure or Have Let Yourself or Your Family Down -   Trouble Concentrating on Things, Such as Reading the Newspaper or Watching Television -   Moving or Speaking So Slowly that Other People Could Have Noticed? Or the Opposite - Being So Fidgety -   Thoughts that You Would be Better Off Dead or of Hurting Yourself in Some Way -   PHQ-9: Brief Depression Severity Measure Score 0   If You Checked Off Any Problems, How Difficult Have These Problems Made It For You to Do Your Work, Take Care of Things at Home, or Get Along with Other People? -       Health Habits and Functional and Cognitive Screening:  Functional & Cognitive Status 11/28/2022   Do you have difficulty preparing food and eating? No   Do you have difficulty bathing yourself, getting dressed or grooming yourself? No   Do you have difficulty using the toilet? No   Do you have difficulty moving around from place to place? No   Do you have trouble with steps or getting out of a bed or a " Reason for Call:  Other prescription    Detailed comments: Patient is calling to request refills for her Gabapentin, Hydrochlorothiazide and Pramipexole. She was a patient of Dr. Amarjit Barney. Wondering if someone else is able to approve these refills. States she gets 3 months supply at a time. States she has been waiting for a week already. Uses [facility] [address] [place] [address]  Please contact patient with any questions. Pharmacy is waiting for response. Thank you.    Phone Number Patient can be reached at: Home number on file [phone] (home)    Best Time: Anytime    Can we leave a detailed message on this number? YES    Call taken on 5/24/2022 at 11:13 AM by Fadi Matthew     chair? No   Current Diet Well Balanced Diet   Dental Exam Not up to date   Eye Exam Not up to date   Exercise (times per week) 1 times per week   Current Exercises Include No Regular Exercise   Current Exercise Activities Include -   Do you need help using the phone?  No   Are you deaf or do you have serious difficulty hearing?  No   Do you need help with transportation? No   Do you need help shopping? No   Do you need help preparing meals?  No   Do you need help with housework?  No   Do you need help with laundry? No   Do you need help taking your medications? No   Do you need help managing money? No   Do you ever drive or ride in a car without wearing a seat belt? No   Have you felt unusual stress, anger or loneliness in the last month? No   Who do you live with? Child   If you need help, do you have trouble finding someone available to you? No   Have you been bothered in the last four weeks by sexual problems? No   Do you have difficulty concentrating, remembering or making decisions? No           Does the patient have evidence of cognitive impairment? Yes    Aspirin use counseling: Taking ASA appropriately as indicated      Recent Lab Results:  CMP:  Lab Results   Component Value Date    BUN 20 05/23/2022    CREATININE 0.93 05/23/2022    EGFRIFNONA 50 (L) 09/24/2021    EGFRIFAFRI 57 (L) 09/24/2021    BCR 22 05/23/2022     05/23/2022    K 3.8 05/23/2022    CO2 20 05/23/2022    CALCIUM 9.4 05/23/2022    PROTENTOTREF 6.9 05/23/2022    ALBUMIN 4.2 05/23/2022    LABGLOBREF 2.7 05/23/2022    LABIL2 1.6 05/23/2022    BILITOT 0.5 05/23/2022    ALKPHOS 133 (H) 05/23/2022    AST 11 05/23/2022    ALT 7 05/23/2022     Lipid Panel:  Lab Results   Component Value Date    CHOL 167 01/06/2020    TRIG 88 05/23/2022    HDL 42 05/23/2022    VLDL 17 05/23/2022    LDLHDL 2.28 01/06/2020     HbA1c:  Lab Results   Component Value Date    HGBA1C 6.6 (H) 05/23/2022       Visual Acuity:  No results found.    Age-appropriate  Screening Schedule:  Refer to the list below for future screening recommendations based on patient's age, sex and/or medical conditions. Orders for these recommended tests are listed in the plan section. The patient has been provided with a written plan.    Health Maintenance   Topic Date Due   • TDAP/TD VACCINES (1 - Tdap) Never done   • LIPID PANEL  05/23/2023   • MAMMOGRAM  06/23/2023   • DXA SCAN  06/23/2023   • INFLUENZA VACCINE  Completed        Subjective   History of Present Illness    Monica Scherer is a 89 y.o. female who presents for an Subsequent Wellness Visit.  Patient a. Fib, htn, hld and hx of stroke;  No cp/soa; due check vitamin D and b12 as low in past;  No new stroke symptoms.  In wc today    The following portions of the patient's history were reviewed and updated as appropriate: allergies, current medications, past family history, past medical history, past social history, past surgical history and problem list.    Outpatient Medications Prior to Visit   Medication Sig Dispense Refill   • alendronate (Fosamax) 70 MG tablet Take 1 tablet by mouth Every 7 (Seven) Days. 12 tablet 3   • aspirin 81 MG chewable tablet CHEW AND SWALLOW 1 TABLET BY MOUTH EVERY DAY (Patient taking differently: Chew 81 mg Every Other Day. QOD) 90 tablet 2   • atorvastatin (LIPITOR) 80 MG tablet TAKE 1 TABLET BY MOUTH EVERY DAY 90 tablet 3   • Cyanocobalamin 1000 MCG/ML kit Inject 1,000 mcg as directed 1 (One) Time Per Week. X 8 weeks then call for monthly regimen 8 kit 0   • Eliquis 5 MG tablet tablet TAKE 1 TABLET BY MOUTH EVERY 12 HOURS 60 tablet 4   • felodipine (PLENDIL) 10 MG 24 hr tablet Take 1 tablet by mouth Daily. 90 tablet 3   • ferrous gluconate (FERGON) 324 MG tablet TAKE ONE TABLET BY MOUTH DAILY 30 tablet 2   • hydrocortisone 2.5 % ointment WIPE TWO TIMES A DAY WITH PEA SIZED AMOUNT 56.7 g 0   • metoprolol tartrate (LOPRESSOR) 25 MG tablet Take 1 tablet by mouth Every 12 (Twelve) Hours. 180 tablet 3   •  "omeprazole (priLOSEC) 20 MG capsule Take 1 capsule by mouth Daily. 90 capsule 3   • Syringe 25G X 1\" 3 ML misc IM weekly x 8 weeks then call for monthly regimen 8 each 0   • vitamin D3 125 MCG (5000 UT) capsule capsule Take 1 capsule by mouth Daily. 90 capsule 3     No facility-administered medications prior to visit.       Patient Active Problem List   Diagnosis   • Vertigo   • Temporary cerebral vascular dysfunction   • Gastroesophageal reflux disease with esophagitis   • Degeneration of intervertebral disc of cervical region   • Prediabetes   • S/P cholecystectomy   • Osteoarthritis of knee   • Herpes zoster without complication   • Hypertension   • Duodenal ulcer   • History of pancreatitis   • Hyperlipidemia   • TIA (transient ischemic attack)   • Cerebrovascular accident (CVA) due to thrombosis of left posterior cerebral artery (HCC)   • Paroxysmal atrial fibrillation (HCC)   • General weakness   • History of CVA (cerebrovascular accident)   • Anticoagulated   • Hematuria   • Dizziness and giddiness   • Rectal bleeding       Advance Care Planning:  ACP discussion was held with the patient during this visit. Patient does not have an advance directive, information provided.    Identification of Risk Factors:  Risk factors include: Obesity/Overweight .    Review of Systems   Respiratory: Negative for shortness of breath.    Cardiovascular: Negative for chest pain and palpitations.   Gastrointestinal: Negative for abdominal pain.       Compared to one year ago, the patient feels her physical health is the same.  Compared to one year ago, the patient feels her mental health is the same.    Objective     Physical Exam  Vitals and nursing note reviewed.   Constitutional:       Appearance: She is well-developed.   HENT:      Head: Normocephalic and atraumatic.   Neck:      Thyroid: No thyromegaly.      Vascular: No JVD.   Cardiovascular:      Rate and Rhythm: Normal rate and regular rhythm.      Heart sounds: Normal " "heart sounds. No murmur heard.    No friction rub. No gallop.   Pulmonary:      Effort: Pulmonary effort is normal. No respiratory distress.      Breath sounds: Normal breath sounds. No wheezing or rales.   Abdominal:      General: Bowel sounds are normal. There is no distension.      Palpations: Abdomen is soft.      Tenderness: There is no abdominal tenderness. There is no guarding or rebound.   Musculoskeletal:      Cervical back: Neck supple.   Skin:     General: Skin is warm and dry.   Neurological:      Mental Status: She is alert.   Psychiatric:         Behavior: Behavior normal.         Vitals:    11/28/22 0841   BP: 140/78   Pulse: 51   SpO2: 96%   Weight: 78.9 kg (174 lb)   Height: 162.6 cm (64\")   PainSc: 0-No pain     Body mass index is 29.87 kg/m².    BMI is >= 25 and <30. (Overweight) The following options were offered after discussion;: weight loss educational material (shared in after visit summary)      Assessment & Plan   Patient Self-Management and Personalized Health Advice  The patient has been provided with information about: diet and exercise and preventive services including:   · Annual Wellness Visit (AWV).    Visit Diagnoses:    ICD-10-CM ICD-9-CM   1. Medicare annual wellness visit, subsequent  Z00.00 V70.0   2. Paroxysmal atrial fibrillation (HCC)  I48.0 427.31   3. Primary hypertension  I10 401.9   4. Mixed hyperlipidemia  E78.2 272.2   5. Prediabetes  R73.03 790.29   6. Vitamin D deficiency  E55.9 268.9   7. B12 deficiency  E53.8 266.2   8. Flu vaccine need  Z23 V04.81       Orders Placed This Encounter   Procedures   • Fluzone High-Dose 65+yrs   • Comprehensive Metabolic Panel     Order Specific Question:   Release to patient     Answer:   Routine Release   • Lipid Panel   • Hemoglobin A1c     Order Specific Question:   Release to patient     Answer:   Routine Release   • Vitamin D,25-Hydroxy     Order Specific Question:   Release to patient     Answer:   Routine Release   • Vitamin " "B12     Order Specific Question:   Release to patient     Answer:   Routine Release   • CBC & Differential     Order Specific Question:   Manual Differential     Answer:   No       Outpatient Encounter Medications as of 11/28/2022   Medication Sig Dispense Refill   • alendronate (Fosamax) 70 MG tablet Take 1 tablet by mouth Every 7 (Seven) Days. 12 tablet 3   • aspirin 81 MG chewable tablet CHEW AND SWALLOW 1 TABLET BY MOUTH EVERY DAY (Patient taking differently: Chew 81 mg Every Other Day. QOD) 90 tablet 2   • atorvastatin (LIPITOR) 80 MG tablet TAKE 1 TABLET BY MOUTH EVERY DAY 90 tablet 3   • Cyanocobalamin 1000 MCG/ML kit Inject 1,000 mcg as directed 1 (One) Time Per Week. X 8 weeks then call for monthly regimen 8 kit 0   • Eliquis 5 MG tablet tablet TAKE 1 TABLET BY MOUTH EVERY 12 HOURS 60 tablet 4   • felodipine (PLENDIL) 10 MG 24 hr tablet Take 1 tablet by mouth Daily. 90 tablet 3   • ferrous gluconate (FERGON) 324 MG tablet TAKE ONE TABLET BY MOUTH DAILY 30 tablet 2   • hydrocortisone 2.5 % ointment WIPE TWO TIMES A DAY WITH PEA SIZED AMOUNT 56.7 g 0   • metoprolol tartrate (LOPRESSOR) 25 MG tablet Take 1 tablet by mouth Every 12 (Twelve) Hours. 180 tablet 3   • omeprazole (priLOSEC) 20 MG capsule Take 1 capsule by mouth Daily. 90 capsule 3   • Syringe 25G X 1\" 3 ML misc IM weekly x 8 weeks then call for monthly regimen 8 each 0   • vitamin D3 125 MCG (5000 UT) capsule capsule Take 1 capsule by mouth Daily. 90 capsule 3     No facility-administered encounter medications on file as of 11/28/2022.       Reviewed use of high risk medication in the elderly: yes  Reviewed for potential of harmful drug interactions in the elderly: yes    Follow Up:  Return in about 6 months (around 5/28/2023), or if symptoms worsen or fail to improve.     An After Visit Summary and PPPS with all of these plans were given to the patient.           ++++++++++++++++++++++++++++++++++++++++++++++++++++++++++++++++++     "

## 2022-12-29 ENCOUNTER — OFFICE VISIT (OUTPATIENT)
Dept: FAMILY MEDICINE | Facility: CLINIC | Age: 72
End: 2022-12-29
Payer: COMMERCIAL

## 2022-12-29 VITALS
HEIGHT: 65 IN | DIASTOLIC BLOOD PRESSURE: 92 MMHG | HEART RATE: 68 BPM | WEIGHT: 186.1 LBS | BODY MASS INDEX: 31 KG/M2 | OXYGEN SATURATION: 96 % | RESPIRATION RATE: 16 BRPM | SYSTOLIC BLOOD PRESSURE: 146 MMHG

## 2022-12-29 DIAGNOSIS — Z12.11 SCREEN FOR COLON CANCER: ICD-10-CM

## 2022-12-29 DIAGNOSIS — Z12.11 SCREENING FOR COLON CANCER: ICD-10-CM

## 2022-12-29 DIAGNOSIS — I10 ESSENTIAL HYPERTENSION: ICD-10-CM

## 2022-12-29 DIAGNOSIS — Z13.1 SCREENING FOR DIABETES MELLITUS: ICD-10-CM

## 2022-12-29 DIAGNOSIS — G25.81 RESTLESS LEG SYNDROME: ICD-10-CM

## 2022-12-29 DIAGNOSIS — M51.369 DDD (DEGENERATIVE DISC DISEASE), LUMBAR: ICD-10-CM

## 2022-12-29 DIAGNOSIS — Z78.0 POST-MENOPAUSE: Primary | ICD-10-CM

## 2022-12-29 DIAGNOSIS — Z13.220 SCREENING FOR HYPERLIPIDEMIA: ICD-10-CM

## 2022-12-29 DIAGNOSIS — R79.9 ABNORMAL FINDING OF BLOOD CHEMISTRY: ICD-10-CM

## 2022-12-29 DIAGNOSIS — H61.21 IMPACTED CERUMEN OF RIGHT EAR: ICD-10-CM

## 2022-12-29 PROCEDURE — 99214 OFFICE O/P EST MOD 30 MIN: CPT | Mod: 25 | Performed by: INTERNAL MEDICINE

## 2022-12-29 PROCEDURE — 0124A COVID-19 VACCINE BIVALENT BOOSTER 12+ (PFIZER): CPT | Performed by: INTERNAL MEDICINE

## 2022-12-29 PROCEDURE — 91312 COVID-19 VACCINE BIVALENT BOOSTER 12+ (PFIZER): CPT | Performed by: INTERNAL MEDICINE

## 2022-12-29 PROCEDURE — 90662 IIV NO PRSV INCREASED AG IM: CPT | Performed by: INTERNAL MEDICINE

## 2022-12-29 PROCEDURE — G0008 ADMIN INFLUENZA VIRUS VAC: HCPCS | Performed by: INTERNAL MEDICINE

## 2022-12-29 PROCEDURE — G0439 PPPS, SUBSEQ VISIT: HCPCS | Performed by: INTERNAL MEDICINE

## 2022-12-29 RX ORDER — GABAPENTIN 300 MG/1
300-600 CAPSULE ORAL 2 TIMES DAILY PRN
Qty: 180 CAPSULE | Refills: 3 | Status: SHIPPED | OUTPATIENT
Start: 2022-12-29 | End: 2023-12-21

## 2022-12-29 RX ORDER — AMLODIPINE BESYLATE 5 MG/1
5 TABLET ORAL EVERY MORNING
Qty: 90 TABLET | Refills: 3 | Status: SHIPPED | OUTPATIENT
Start: 2022-12-29 | End: 2023-12-21

## 2022-12-29 RX ORDER — PRAMIPEXOLE DIHYDROCHLORIDE 0.12 MG/1
.125-.25 TABLET ORAL AT BEDTIME
Qty: 180 TABLET | Refills: 3 | Status: SHIPPED | OUTPATIENT
Start: 2022-12-29 | End: 2023-12-21

## 2022-12-29 NOTE — PROGRESS NOTES
SUBJECTIVE:   Helen is a 72 year old who presents for Preventive Visit.  Patient has been advised of split billing requirements and indicates understanding: Yes  Are you in the first 12 months of your Medicare coverage?  No    History of Present Illness       Hypertension: She presents for follow up of hypertension.  She does not check blood pressure  regularly outside of the clinic. Outside blood pressures have been over 140/90. She does not follow a low salt diet.      Cerumen in the ears.     Flu shot and covid booster.     Have you ever done Advance Care Planning? (For example, a Health Directive, POLST, or a discussion with a medical provider or your loved ones about your wishes): Yes, advance care planning is on file.    Fall risk  Fallen 2 or more times in the past year?: No  Any fall with injury in the past year?: No    Cognitive Screening   1) Repeat 3 items (Leader, Season, Table)    2) Clock draw: NORMAL  3) 3 item recall: Recalls 3 objects  Results: NORMAL clock, 1-2 items recalled: COGNITIVE IMPAIRMENT LESS LIKELY    Mini-CogTM Copyright SEEMA Browne. Licensed by the author for use in [facility]; reprinted with permission (soob@[facility]). All rights reserved.          Reviewed and updated as needed this visit by clinical staff   Tobacco  Allergies  Meds              Reviewed and updated as needed this visit by Provider                 Social History     Tobacco Use     Smoking status: Former     Types: Cigarettes     Quit date: 1991     Years since quittin.9     Smokeless tobacco: Never   Substance Use Topics     Alcohol use: Yes     Comment: occ       Alcohol Use 11/10/2017   Prescreen: >3 drinks/day or >7 drinks/week? The patient does not drink >3 drinks per day nor >7 drinks per week.       Current providers sharing in care for this patient include:   Patient Care Team:  Golden Christensen MD as PCP - General (Internal Medicine - Pediatrics)  Leroy Peacock DO as  "Assigned Musculoskeletal Provider  Paul Fregoso MD as Assigned Pulmonology Provider  Dwight Ricci MD as Assigned PCP    The following health maintenance items are reviewed in Epic and correct as of today:  Health Maintenance   Topic Date Due     ZOSTER IMMUNIZATION (1 of 2) Never done     MEDICARE ANNUAL WELLNESS VISIT  10/05/2019     MAMMO SCREENING  06/29/2020     DTAP/TDAP/TD IMMUNIZATION (2 - Td or Tdap) 01/17/2021     COLORECTAL CANCER SCREENING  12/08/2021     ANNUAL REVIEW OF HM ORDERS  12/29/2023     FALL RISK ASSESSMENT  12/29/2023     BMP  12/30/2023     DEXA  11/13/2024     ADVANCE CARE PLANNING  10/05/2026     LIPID  12/30/2027     HEPATITIS C SCREENING  Completed     PHQ-2 (once per calendar year)  Completed     INFLUENZA VACCINE  Completed     Pneumococcal Vaccine: 65+ Years  Completed     COVID-19 Vaccine  Completed     IPV IMMUNIZATION  Aged Out     MENINGITIS IMMUNIZATION  Aged Out         Review of Systems  Constitutional, HEENT, cardiovascular, pulmonary, gi and gu systems are negative, except as otherwise noted.    OBJECTIVE:   BP (!) 146/92   Pulse 68   Resp 16   Ht 1.651 m (5' 5\")   Wt 84.4 kg (186 lb 1.6 oz)   SpO2 96%   BMI 30.97 kg/m   Estimated body mass index is 30.97 kg/m  as calculated from the following:    Height as of this encounter: 1.651 m (5' 5\").    Weight as of this encounter: 84.4 kg (186 lb 1.6 oz).  Physical Exam  GENERAL: healthy, alert and no distress  EYES: Eyes grossly normal to inspection, PERRL and conjunctivae and sclerae normal  HENT: normal cephalic/atraumatic, right ear: occluded with wax, left ear: normal: no effusions, no erythema, normal landmarks, nose and mouth without ulcers or lesions, oropharynx clear and oral mucous membranes moist  NECK: no adenopathy, no asymmetry, masses, or scars and thyroid normal to palpation  RESP: lungs clear to auscultation - no rales, rhonchi or wheezes  CV: regular rate and rhythm, normal S1 S2, no S3 or S4, no " murmur, click or rub, no peripheral edema and peripheral pulses strong  ABDOMEN: soft, nontender, no hepatosplenomegaly, no masses and bowel sounds normal  MS: no gross musculoskeletal defects noted, no edema    Diagnostic Test Results:  Labs reviewed in Epic    ASSESSMENT / PLAN:       ICD-10-CM    1. Post-menopause  Z78.0 DX Hip/Pelvis/Spine      2. Screen for colon cancer  Z12.11       3. Screening for colon cancer  Z12.11       4. DDD (degenerative disc disease), lumbar  M51.36 gabapentin (NEURONTIN) 300 MG capsule   Doing well on gabapentin therapy.  We will continue this refilled today discussed there is room to increase if needed.   5. Essential hypertension  I10 amLODIPine (NORVASC) 5 MG tablet   Increase amodlipine from 2.5 to 5 mg per dayWill continue with amlodipine therapy she will check levels at home and let us know how her blood pressure is doing.   6. Screening for hyperlipidemia  Z13.220 Lipid panel reflex to direct LDL Fasting      7. Screening for diabetes mellitus  Z13.1 Comprehensive metabolic panel      8. Abnormal finding of blood chemistry  R79.9 Hemoglobin A1c      9. Restless leg syndrome doing well with current therapy refilled medications today.  Will increase slightly as needed for slight improvement as she is having some ongoing symptoms. G25.81 pramipexole (MIRAPEX) 0.125 MG tablet        Has some impacted cerumen will use debrox, can return for RN only visit for washing if needed.    Patient Instructions   Tetanus with pertussis vaccine can check at the pharmacy for this.     Check at your pharmacy for your shingles vaccine. You can call the number on the back of insurance card to see if covered in clinic.     Increase the amlodipine to 5 mg per day and send me some blood pressures next week to let me know how things are going. You can also come in for a nurse only BLOOD PRESSURE check as well.     We will get a repeat bone strength exam as we discussed. They should call for this and  the mammogram if not Please call [facility] [phone] to arrange your study.    Labs next week    Ok to try the mirapex at 1-2 pills per night.     Send in cologuard testing when able.     Golden Christensen MD                    COUNSELING:  Reviewed preventive health counseling, as reflected in patient instructions        She reports that she quit smoking about 31 years ago. She has never used smokeless tobacco.      Appropriate preventive services were discussed with this patient, including applicable screening as appropriate for cardiovascular disease, diabetes, osteopenia/osteoporosis, and glaucoma.  As appropriate for age/gender, discussed screening for colorectal cancer, prostate cancer, breast cancer, and cervical cancer. Checklist reviewing preventive services available has been given to the patient.    Reviewed patients plan of care and provided an AVS. The Basic Care Plan (routine screening as documented in Health Maintenance) for Andree meets the Care Plan requirement. This Care Plan has been established and reviewed with the Patient.      Golden Christensen MD  [facility]    Identified Health Risks:

## 2022-12-29 NOTE — PATIENT INSTRUCTIONS
Tetanus with pertussis vaccine can check at the pharmacy for this.     Check at your pharmacy for your shingles vaccine. You can call the number on the back of insurance card to see if covered in clinic.     Increase the amlodipine to 5 mg per day and send me some blood pressures next week to let me know how things are going. You can also come in for a nurse only BLOOD PRESSURE check as well.     We will get a repeat bone strength exam as we discussed. They should call for this and the mammogram if not Please call [facility] [phone] to arrange your study.    Labs next week    Ok to try the mirapex at 1-2 pills per night.     Send in cologuard testing when able.     Golden Christensen MD

## 2022-12-30 ENCOUNTER — LAB (OUTPATIENT)
Dept: LAB | Facility: CLINIC | Age: 72
End: 2022-12-30
Payer: COMMERCIAL

## 2022-12-30 DIAGNOSIS — Z13.1 SCREENING FOR DIABETES MELLITUS: ICD-10-CM

## 2022-12-30 DIAGNOSIS — R79.9 ABNORMAL FINDING OF BLOOD CHEMISTRY: ICD-10-CM

## 2022-12-30 DIAGNOSIS — Z13.220 SCREENING FOR HYPERLIPIDEMIA: ICD-10-CM

## 2022-12-30 PROBLEM — M51.369 DDD (DEGENERATIVE DISC DISEASE), LUMBAR: Status: ACTIVE | Noted: 2022-12-30

## 2022-12-30 PROBLEM — G25.81 RESTLESS LEG SYNDROME: Status: ACTIVE | Noted: 2022-12-30

## 2022-12-30 LAB
ALBUMIN SERPL BCG-MCNC: 4.1 G/DL (ref 3.5–5.2)
ALP SERPL-CCNC: 142 U/L (ref 35–104)
ALT SERPL W P-5'-P-CCNC: 27 U/L (ref 10–35)
ANION GAP SERPL CALCULATED.3IONS-SCNC: 12 MMOL/L (ref 7–15)
AST SERPL W P-5'-P-CCNC: 27 U/L (ref 10–35)
BILIRUB SERPL-MCNC: 0.4 MG/DL
BUN SERPL-MCNC: 13.2 MG/DL (ref 8–23)
CALCIUM SERPL-MCNC: 9.3 MG/DL (ref 8.8–10.2)
CHLORIDE SERPL-SCNC: 103 MMOL/L (ref 98–107)
CHOLEST SERPL-MCNC: 161 MG/DL
CREAT SERPL-MCNC: 0.68 MG/DL (ref 0.51–0.95)
DEPRECATED HCO3 PLAS-SCNC: 27 MMOL/L (ref 22–29)
GFR SERPL CREATININE-BSD FRML MDRD: >90 ML/MIN/1.73M2
GLUCOSE SERPL-MCNC: 102 MG/DL (ref 70–99)
HBA1C MFR BLD: 5.5 % (ref 0–5.6)
HDLC SERPL-MCNC: 62 MG/DL
LDLC SERPL CALC-MCNC: 78 MG/DL
NONHDLC SERPL-MCNC: 99 MG/DL
POTASSIUM SERPL-SCNC: 3.9 MMOL/L (ref 3.4–5.3)
PROT SERPL-MCNC: 6.9 G/DL (ref 6.4–8.3)
SODIUM SERPL-SCNC: 142 MMOL/L (ref 136–145)
TRIGL SERPL-MCNC: 107 MG/DL

## 2022-12-30 PROCEDURE — 80061 LIPID PANEL: CPT

## 2022-12-30 PROCEDURE — 36415 COLL VENOUS BLD VENIPUNCTURE: CPT

## 2022-12-30 PROCEDURE — 80053 COMPREHEN METABOLIC PANEL: CPT

## 2022-12-30 PROCEDURE — 83036 HEMOGLOBIN GLYCOSYLATED A1C: CPT

## 2023-01-13 ENCOUNTER — ANCILLARY PROCEDURE (OUTPATIENT)
Dept: BONE DENSITY | Facility: CLINIC | Age: 73
End: 2023-01-13
Attending: INTERNAL MEDICINE
Payer: COMMERCIAL

## 2023-01-13 DIAGNOSIS — Z78.0 POST-MENOPAUSE: ICD-10-CM

## 2023-01-13 PROCEDURE — 77080 DXA BONE DENSITY AXIAL: CPT | Mod: TC | Performed by: RADIOLOGY

## 2023-02-13 DIAGNOSIS — I10 ESSENTIAL HYPERTENSION: ICD-10-CM

## 2023-02-14 RX ORDER — HYDROCHLOROTHIAZIDE 12.5 MG/1
CAPSULE ORAL
Qty: 90 CAPSULE | Refills: 1 | Status: SHIPPED | OUTPATIENT
Start: 2023-02-14 | End: 2023-11-08

## 2023-02-14 NOTE — TELEPHONE ENCOUNTER
"Routing refill request to provider for review/approval because:  bp out of range    Last Written Prescription Date:  8/16/22  Last Fill Quantity: 90,  # refills: 1   Last office visit provider:  12/29/22     Requested Prescriptions   Pending Prescriptions Disp Refills     hydrochlorothiazide (MICROZIDE) 12.5 MG capsule 90 capsule 1     Sig: Take 1 capsule by mouth once daily       Diuretics (Including Combos) Protocol Failed - 2/13/2023  8:44 AM        Failed - Blood pressure under 140/90 in past 12 months     BP Readings from Last 3 Encounters:   12/29/22 (!) 146/92   04/26/22 (!) 142/91   03/11/22 (!) 163/92                 Passed - Recent (12 mo) or future (30 days) visit within the authorizing provider's specialty     Patient has had an office visit with the authorizing provider or a provider within the authorizing providers department within the previous 12 mos or has a future within next 30 days. See \"Patient Info\" tab in inbasket, or \"Choose Columns\" in Meds & Orders section of the refill encounter.              Passed - Medication is active on med list        Passed - Patient is age 18 or older        Passed - No active pregancy on record        Passed - Normal serum creatinine on file in past 12 months     Recent Labs   Lab Test 12/30/22  0813   CR 0.68              Passed - Normal serum potassium on file in past 12 months     Recent Labs   Lab Test 12/30/22  0813   POTASSIUM 3.9                    Passed - Normal serum sodium on file in past 12 months     Recent Labs   Lab Test 12/30/22  0813                 Passed - No positive pregnancy test in past 12 months             Bobbi Rand RN 02/14/23 10:47 AM  "

## 2023-03-08 DIAGNOSIS — Z12.31 ENCOUNTER FOR SCREENING MAMMOGRAM FOR BREAST CANCER: Primary | ICD-10-CM

## 2023-03-22 ENCOUNTER — HOSPITAL ENCOUNTER (OUTPATIENT)
Dept: MAMMOGRAPHY | Facility: CLINIC | Age: 73
Discharge: HOME | End: 2023-03-22
Attending: INTERNAL MEDICINE | Admitting: INTERNAL MEDICINE
Payer: COMMERCIAL

## 2023-03-22 DIAGNOSIS — Z12.31 ENCOUNTER FOR SCREENING MAMMOGRAM FOR BREAST CANCER: ICD-10-CM

## 2023-03-22 PROCEDURE — 77067 SCR MAMMO BI INCL CAD: CPT

## 2023-03-23 ENCOUNTER — ANCILLARY ORDERS (OUTPATIENT)
Dept: FAMILY MEDICINE | Facility: CLINIC | Age: 73
End: 2023-03-23

## 2023-03-23 DIAGNOSIS — N64.89 BREAST ASYMMETRY: ICD-10-CM

## 2023-04-05 ENCOUNTER — TELEPHONE (OUTPATIENT)
Dept: ORTHOPEDICS | Facility: CLINIC | Age: 73
End: 2023-04-05
Payer: COMMERCIAL

## 2023-04-05 DIAGNOSIS — M17.12 PRIMARY OSTEOARTHRITIS OF LEFT KNEE: Primary | ICD-10-CM

## 2023-04-05 NOTE — TELEPHONE ENCOUNTER
Patient scheduled [facility] on 4[facility]14/23 [facility] viscos[facility]lement[facility] vs steroid injection of left knee.        SynviscOne injection last completed 03/2022.  Patient reports relief for 6 months.       Patient has failed trial of OTC NSAIDs/Pain Medication (ibuprofen, tylenol, naproxen,...):  Yes       Patient has completed trial of physical therapy: No    Prior authorization referral for SynviscOne injection pended.    Please advise    Mo Scott ATC

## 2023-04-11 ENCOUNTER — HOSPITAL ENCOUNTER (OUTPATIENT)
Dept: MAMMOGRAPHY | Facility: CLINIC | Age: 73
Discharge: HOME | End: 2023-04-11
Attending: INTERNAL MEDICINE
Payer: COMMERCIAL

## 2023-04-11 ENCOUNTER — ANCILLARY ORDERS (OUTPATIENT)
Dept: FAMILY MEDICINE | Facility: CLINIC | Age: 73
End: 2023-04-11

## 2023-04-11 DIAGNOSIS — R92.8 OTHER ABNORMAL AND INCONCLUSIVE FINDINGS ON DIAGNOSTIC IMAGING OF BREAST: ICD-10-CM

## 2023-04-11 DIAGNOSIS — N64.89 BREAST ASYMMETRY: ICD-10-CM

## 2023-04-11 PROCEDURE — 77061 BREAST TOMOSYNTHESIS UNI: CPT | Mod: LT

## 2023-04-11 PROCEDURE — 76642 ULTRASOUND BREAST LIMITED: CPT | Mod: LT

## 2023-04-11 NOTE — PROGRESS NOTES
Radiologist, Dr Juliane Moreno, recommends left breast ultrasound guided biopsy.     RN scheduled pt at [facility], [address] , [address], [place], [place]. [phone].  Appt: Tues 4/18/23, arrival at 10:45am for 11:00am appt.     RN reviewed the procedure with patient and gave patient written pre and post biopsy handouts to take home.     Pt verbalizes understanding of procedure and appt location, date, and time. Calls welcomed.    Aruna Rico, RN, BSN, [facility]  [facility]

## 2023-04-11 NOTE — LETTER
Andree Trujillo  [address] [facility] [address]            April 11, 2023  Date of Exam: 4/11/23    Dear Andree:    Thank you for your recent visit.    Breast Imaging Result: Based on your recent breast imaging, you have a suspicious area that usually requires a biopsy, at which time a small tissue sample would be taken from your breast.      Breast Density: Your breast imaging shows that you have dense breast tissue. This means you have a slightly higher risk of getting breast cancer. It also means your breast imaging will be harder to read, but it doesn't mean that breast imaging isn't useful. In fact, yearly breast imaging is even more important for individuals at higher risk. Additional testing may be warranted depending on your overall risk.    If you have already made these arrangements, please disregard this letter.    A report of your breast imaging results was sent to: Golden Christensen    Your breast imaging will become part of your medical file here at [facility] for at least 10 years. You are responsible for informing any new health care team or breast imaging facility of the date and location of this examination.    We appreciate the opportunity to participate in your health care.    Sincerely,  Juliane Moreno MD   [facility]

## 2023-04-13 NOTE — PROGRESS NOTES
Andree Trujillo  :  1950  DOS: 3/11/2022  MRN: [id number]    Sports Medicine Clinic Visit    PCP: Yani Miller    Andree Trujillo is a 70 year old female who is seen in follow-up presenting with chronic left knee and right hip pain.    Interim History - 2023  Since last visit on 3/11/2022, patient has increased pain in the knee,more so than her hip. Last synvisc injection gave relief for around a year in the knee. Both knee and hip are starting to bother her again.  No interim injury.       Interim History - 2021  Since last visit on 2021 patient has moderate-severe left knee and right hip pain.  Left knee steroid injection completed on 2020 by Sin Navarrete PA-C provided good relief for ~ 3 months.  Right hip intra-articular steroid injection was last completed 21 with good relief for ~ 3 months.  Patient notes that hip and knee pain are worse with walking and going from sit to stand.  She is also noting generalized lower lumbar spine pain with walking and bending at the waist.  Currently treating with OTC pain medication with only mild relief.  No new injury in the interim.    Social History: retired    Interim History - 2022  Since last visit on 2021 patient has noticed an increase in medial left knee pain.  Left knee corticosteroid injection completed on 2021 provided 6 months of great relief and pain has returned over the past month and a half. She takes Advil. Moments of instability noted and denies swelling and tingling. She is interested in a repeat corticosteroid injection today.  No new injury in the interim.    Review of Systems  Musculoskeletal: as above  Remainder of review of systems is negative including constitutional, CV, pulmonary, GI, Skin and Neurologic except as noted in HPI or medical history.    Past Medical History:   Diagnosis Date     Allergies      Anxiety      Cyst of breast      Essential hypertension      Osteoarthritis  of spine with radiculopathy, lumbar region      Osteopenia      Right hip pain      Right knee pain      Spinal stenosis of lumbar region with neurogenic claudication      Tendinitis of both feet      Past Surgical History:   Procedure Laterality Date     HYSTERECTOMY TOTAL ABDOMINAL      11 years ago. Benign ovarian tumor. Everything removed.     HYSTERECTOMY, PAP NO LONGER INDICATED  2009    Total hysterectomy with bilateral oophorectomy due to Pelvic pain     Family History   Problem Relation Age of Onset     Breast Cancer Mother      Arthritis Mother      Hypertension Father      Alcohol/Drug Father      Arthritis Father      Cerebrovascular Disease Maternal Grandmother      Cancer Maternal Grandmother      Cancer Maternal Grandfather      Hypertension Paternal Grandfather      Hypertension Brother      Prostate Cancer Brother      Alcohol/Drug Brother      Allergies Brother      Diabetes Paternal Aunt      Uterine Cancer Sister      Coronary Artery Disease No family hx of        Objective  BP (!) 163/92   Pulse 67   Wt 83.6 kg (184 lb 4.8 oz)   BMI 31.15 kg/m        General: healthy, alert and in no distress      HEENT: no scleral icterus or conjunctival erythema     Skin: no suspicious lesions or rash. No jaundice.     CV: regular rhythm by palpation, 2+ distal pulses, no pedal edema      Resp: normal respiratory effort without conversational dyspnea     Psych: normal mood and affect      Gait: mildly antalgic, appropriate coordination and balance     Neuro: normal light touch sensory exam of the extremities. Motor strength as noted below     Left Knee exam    ROM:        Flexion 130 degrees       Extension -2 degrees       Range of motion limited by pain in terminal flexion > extension    Inspection:       no visible ecchymosis        effusion noted trace/small    Skin:       no visible deformities       well perfused       capillary refill brisk    Patellar Motion:        Crepitus noted in the  patellofemoral joint    Tender:        lateral patellar border       medial joint line       lateral joint line    Non Tender:         remainder of knee area        along MCL        distal IT Band        infrapatellar tendon        tibial tubercle       pes anserine bursa    Special Tests:        neg (-) varus at 0 deg and 30 deg       neg (-) valgus at 0 deg and 30 deg    Right hip exam     Inspection:        no edema or ecchymosis in hip area     ROM:       Flexion 120       internal rotation 20      external rotation 40      Range of motion limited by pain     Strength:        flexion 5-/5       extension 5/5       abduction 5-/5       adduction 5-/5     Tender:        greater trochanter mild       Anterior hip joint        Lumbar paraspinals mild/moderate     Non Tender:        remainder of hip area       illiac crest       ASIS       pubis     Sensation:        grossly intact in hip and thigh     Skin:       well perfused       capillary refill brisk     Special Tests:        neg (-) NATI       positive (+) FADIR       equivocal scour    Large Joint Injection/Arthocentesis: R hip joint    Date/Time: 4/14/2023 4:11 PM    Performed by: Leroy Peacock DO  Authorized by: Leroy Peacock DO    Indications:  Pain and diagnostic evaluation  Needle Size:  22 G  Guidance: ultrasound    Approach:  Anterior  Location:  Hip      Site:  R hip joint  Medications:  3 mL ropivacaine 5 MG/ML; 40 mg triamcinolone 40 MG/ML  Outcome:  Tolerated well, no immediate complications  Procedure discussed: discussed risks, benefits, and alternatives    Consent Given by:  Patient  Timeout: timeout called immediately prior to procedure    Prep: patient was prepped and draped in usual sterile fashion     2 ml's of 1% lidocaine was used as local anesthetic prior to injection    Ultrasound images of procedure were permanently stored.       Large Joint Injection/Arthocentesis: L knee joint    Date/Time: 4/14/2023 4:14  PM    Performed by: Leroy Peacock DO  Authorized by: Leroy Peacock DO    Indications:  Pain  Needle Size:  21 G  Guidance: ultrasound    Approach:  Anterolateral  Location:  Knee      Medications:  48 mg hylan 48 MG/6ML  Aspirate amount (mL):  4  Aspirate:  Clear and yellow  Outcome:  Tolerated well, no immediate complications  Procedure discussed: discussed risks, benefits, and alternatives    Consent Given by:  Patient  Timeout: timeout called immediately prior to procedure    Prep: patient was prepped and draped in usual sterile fashion     Ultrasound images of procedure were permanently stored.         .[facility]          Radiology  HIP RIGHT 2-3 VIEWS   12/2/2020 11:05 AM      HISTORY: Hip pain, right.     FINDINGS: Lower lumbar spine degenerative change.                                                                      IMPRESSION:   1. Mild femoral head osteophytosis consistent with early  osteoarthritis.  2. There is a acetabular pincer-type configuration. This can  predispose to femoroacetabular impingement, and clinical correlation  is recommended.    KNEE LEFT THREE VIEWS December 2, 2020 11:04 AM      HISTORY: Left knee pain, unspecified chronicity.                                                                      IMPRESSION:   1. Slight lateral patellar subluxation.  2. I cannot exclude one or two loose bodies in the notch region.  3. No fracture identified.  4. Small asymmetric dense area within the tibial proximal diaphysis.  This could represent a normal variant area of focal trabeculation, but  a small benign enchondroma or chronic bone infarct cannot be excluded.       Assessment:  (M16.11) Primary osteoarthritis of right hip  (primary encounter diagnosis)  (M17.12) Primary osteoarthritis of left knee      Plan:  Discussed the assessment with the patient.  Follow up: As needed based on clinical progress  Left knee arthritis, likely compensatory component between the right  hip and left knee, reviewed in detail again today, hip currently becoming more painful again as well  XR images independently visualized and reviewed with patient again today in clinic  Physical therapy options and low impact impact activity strategies to build core strength reviewed in detail today  Reviewed options for potential steroid vs viscosupplementation injections and the possibility for future orthopedic referral prn for the knee  Repeat US guided viscosupplementation today, can repeat CSI in the future if needed based on clinical progress over the next 4 weeks  Repeat Us guided right hip joint CSI as well  Reviewed safe and appropriate OTC medication choices, try tylenol first  Up to 3000mg daily of tylenol is generally safe, NSAID dosing and duration limitations reviewed  Discussed nature of degenerative arthrosis of the knee.   Discussed symptom treatment with ice or heat, topical treatments, and rest if needed.   Expectations and limitations of synvisc were reviewed in detail  Often 4-6 weeks before full effect may be noticed  Usually covered up to every 6 months by insurance, but does not need to be repeated unless pain returns, at which point we would re-evaluate  Potential use of CSI in future for flares of pain reviewed in detail  Encouraged modified progressive pain-free activity as tolerated  Expectations and goals of CSI reviewed  Often 2-3 days for steroid effect, and can take up to two weeks for maximum effect  We discussed modified progressive pain-free activity as tolerated  Do not overuse in first two weeks if feeling better due to concern for vulnerability while steroid is working  Supportive care reviewed  All questions were answered today  Contact us with additional questions or concerns  Signs and sx of concern reviewed      Leroy Peacock DO, MANI  Sports Medicine Physician  [facility]

## 2023-04-14 ENCOUNTER — OFFICE VISIT (OUTPATIENT)
Dept: ORTHOPEDICS | Facility: CLINIC | Age: 73
End: 2023-04-14
Payer: COMMERCIAL

## 2023-04-14 DIAGNOSIS — M16.11 PRIMARY OSTEOARTHRITIS OF RIGHT HIP: Primary | ICD-10-CM

## 2023-04-14 DIAGNOSIS — M17.12 PRIMARY OSTEOARTHRITIS OF LEFT KNEE: ICD-10-CM

## 2023-04-14 PROCEDURE — 20611 DRAIN/INJ JOINT/BURSA W/US: CPT | Mod: LT | Performed by: FAMILY MEDICINE

## 2023-04-14 PROCEDURE — 20611 DRAIN/INJ JOINT/BURSA W/US: CPT | Mod: RT | Performed by: FAMILY MEDICINE

## 2023-04-14 RX ORDER — ROPIVACAINE HYDROCHLORIDE 5 MG/ML
3 INJECTION, SOLUTION EPIDURAL; INFILTRATION; PERINEURAL
Status: SHIPPED | OUTPATIENT
Start: 2023-04-14

## 2023-04-14 RX ORDER — TRIAMCINOLONE ACETONIDE 40 MG/ML
40 INJECTION, SUSPENSION INTRA-ARTICULAR; INTRAMUSCULAR
Status: SHIPPED | OUTPATIENT
Start: 2023-04-14

## 2023-04-14 RX ADMIN — ROPIVACAINE HYDROCHLORIDE 3 ML: 5 INJECTION, SOLUTION EPIDURAL; INFILTRATION; PERINEURAL at 16:11

## 2023-04-14 RX ADMIN — TRIAMCINOLONE ACETONIDE 40 MG: 40 INJECTION, SUSPENSION INTRA-ARTICULAR; INTRAMUSCULAR at 16:11

## 2023-04-14 NOTE — LETTER
2023         RE: Andree Trujillo  [address] [facility] [address]        Dear Colleague,    Thank you for referring your patient, Andree Trujillo, to the [facility]. Please see a copy of my visit note below.    Andree Trujillo  :  1950  DOS: 3/11/2022  MRN: [id number]    Sports Medicine Clinic Visit    PCP: Yani Miller    Andree Trujillo is a 70 year old female who is seen in follow-up presenting with chronic left knee and right hip pain.    Interim History - 2023  Since last visit on 3/11/2022, patient has increased pain in the knee,more so than her hip. Last synvisc injection gave relief for around a year in the knee. Both knee and hip are starting to bother her again.  No interim injury.       Interim History - 2021  Since last visit on 2021 patient has moderate-severe left knee and right hip pain.  Left knee steroid injection completed on 2020 by Sin Navarrete PA-C provided good relief for ~ 3 months.  Right hip intra-articular steroid injection was last completed 21 with good relief for ~ 3 months.  Patient notes that hip and knee pain are worse with walking and going from sit to stand.  She is also noting generalized lower lumbar spine pain with walking and bending at the waist.  Currently treating with OTC pain medication with only mild relief.  No new injury in the interim.    Social History: retired    Interim History - 2022  Since last visit on 2021 patient has noticed an increase in medial left knee pain.  Left knee corticosteroid injection completed on 2021 provided 6 months of great relief and pain has returned over the past month and a half. She takes Advil. Moments of instability noted and denies swelling and tingling. She is interested in a repeat corticosteroid injection today.  No new injury in the interim.    Review of Systems  Musculoskeletal: as above  Remainder of review of  systems is negative including constitutional, CV, pulmonary, GI, Skin and Neurologic except as noted in HPI or medical history.    Past Medical History:   Diagnosis Date     Allergies      Anxiety      Cyst of breast      Essential hypertension      Osteoarthritis of spine with radiculopathy, lumbar region      Osteopenia      Right hip pain      Right knee pain      Spinal stenosis of lumbar region with neurogenic claudication      Tendinitis of both feet      Past Surgical History:   Procedure Laterality Date     HYSTERECTOMY TOTAL ABDOMINAL      11 years ago. Benign ovarian tumor. Everything removed.     HYSTERECTOMY, PAP NO LONGER INDICATED  2009    Total hysterectomy with bilateral oophorectomy due to Pelvic pain     Family History   Problem Relation Age of Onset     Breast Cancer Mother      Arthritis Mother      Hypertension Father      Alcohol/Drug Father      Arthritis Father      Cerebrovascular Disease Maternal Grandmother      Cancer Maternal Grandmother      Cancer Maternal Grandfather      Hypertension Paternal Grandfather      Hypertension Brother      Prostate Cancer Brother      Alcohol/Drug Brother      Allergies Brother      Diabetes Paternal Aunt      Uterine Cancer Sister      Coronary Artery Disease No family hx of        Objective  BP (!) 163/92   Pulse 67   Wt 83.6 kg (184 lb 4.8 oz)   BMI 31.15 kg/m        General: healthy, alert and in no distress      HEENT: no scleral icterus or conjunctival erythema     Skin: no suspicious lesions or rash. No jaundice.     CV: regular rhythm by palpation, 2+ distal pulses, no pedal edema      Resp: normal respiratory effort without conversational dyspnea     Psych: normal mood and affect      Gait: mildly antalgic, appropriate coordination and balance     Neuro: normal light touch sensory exam of the extremities. Motor strength as noted below     Left Knee exam    ROM:        Flexion 130 degrees       Extension -2 degrees       Range of motion limited  by pain in terminal flexion > extension    Inspection:       no visible ecchymosis        effusion noted trace/small    Skin:       no visible deformities       well perfused       capillary refill brisk    Patellar Motion:        Crepitus noted in the patellofemoral joint    Tender:        lateral patellar border       medial joint line       lateral joint line    Non Tender:         remainder of knee area        along MCL        distal IT Band        infrapatellar tendon        tibial tubercle       pes anserine bursa    Special Tests:        neg (-) varus at 0 deg and 30 deg       neg (-) valgus at 0 deg and 30 deg    Right hip exam     Inspection:        no edema or ecchymosis in hip area     ROM:       Flexion 120       internal rotation 20      external rotation 40      Range of motion limited by pain     Strength:        flexion 5-/5       extension 5/5       abduction 5-/5       adduction 5-/5     Tender:        greater trochanter mild       Anterior hip joint        Lumbar paraspinals mild/moderate     Non Tender:        remainder of hip area       illiac crest       ASIS       pubis     Sensation:        grossly intact in hip and thigh     Skin:       well perfused       capillary refill brisk     Special Tests:        neg (-) NATI       positive (+) FADIR       equivocal scour    Large Joint Injection/Arthocentesis: R hip joint    Date/Time: 4/14/2023 4:11 PM    Performed by: Leroy Peacock DO  Authorized by: Leroy Peacock DO    Indications:  Pain and diagnostic evaluation  Needle Size:  22 G  Guidance: ultrasound    Approach:  Anterior  Location:  Hip      Site:  R hip joint  Medications:  3 mL ropivacaine 5 MG/ML; 40 mg triamcinolone 40 MG/ML  Outcome:  Tolerated well, no immediate complications  Procedure discussed: discussed risks, benefits, and alternatives    Consent Given by:  Patient  Timeout: timeout called immediately prior to procedure    Prep: patient was prepped and  draped in usual sterile fashion     2 ml's of 1% lidocaine was used as local anesthetic prior to injection    Ultrasound images of procedure were permanently stored.       Large Joint Injection/Arthocentesis: L knee joint    Date/Time: 4/14/2023 4:14 PM    Performed by: Leroy Peacock DO  Authorized by: Leroy Peacock DO    Indications:  Pain  Needle Size:  21 G  Guidance: ultrasound    Approach:  Anterolateral  Location:  Knee      Medications:  48 mg hylan 48 MG/6ML  Aspirate amount (mL):  4  Aspirate:  Clear and yellow  Outcome:  Tolerated well, no immediate complications  Procedure discussed: discussed risks, benefits, and alternatives    Consent Given by:  Patient  Timeout: timeout called immediately prior to procedure    Prep: patient was prepped and draped in usual sterile fashion     Ultrasound images of procedure were permanently stored.         .[facility]          Radiology  HIP RIGHT 2-3 VIEWS   12/2/2020 11:05 AM      HISTORY: Hip pain, right.     FINDINGS: Lower lumbar spine degenerative change.                                                                      IMPRESSION:   1. Mild femoral head osteophytosis consistent with early  osteoarthritis.  2. There is a acetabular pincer-type configuration. This can  predispose to femoroacetabular impingement, and clinical correlation  is recommended.    KNEE LEFT THREE VIEWS December 2, 2020 11:04 AM      HISTORY: Left knee pain, unspecified chronicity.                                                                      IMPRESSION:   1. Slight lateral patellar subluxation.  2. I cannot exclude one or two loose bodies in the notch region.  3. No fracture identified.  4. Small asymmetric dense area within the tibial proximal diaphysis.  This could represent a normal variant area of focal trabeculation, but  a small benign enchondroma or chronic bone infarct cannot be excluded.       Assessment:  (M16.11) Primary osteoarthritis of right hip   (primary encounter diagnosis)  (M17.12) Primary osteoarthritis of left knee      Plan:  Discussed the assessment with the patient.  Follow up: As needed based on clinical progress  Left knee arthritis, likely compensatory component between the right hip and left knee, reviewed in detail again today, hip currently becoming more painful again as well  XR images independently visualized and reviewed with patient again today in clinic  Physical therapy options and low impact impact activity strategies to build core strength reviewed in detail today  Reviewed options for potential steroid vs viscosupplementation injections and the possibility for future orthopedic referral prn for the knee  Repeat US guided viscosupplementation today, can repeat CSI in the future if needed based on clinical progress over the next 4 weeks  Repeat Us guided right hip joint CSI as well  Reviewed safe and appropriate OTC medication choices, try tylenol first  Up to 3000mg daily of tylenol is generally safe, NSAID dosing and duration limitations reviewed  Discussed nature of degenerative arthrosis of the knee.   Discussed symptom treatment with ice or heat, topical treatments, and rest if needed.   Expectations and limitations of synvisc were reviewed in detail  Often 4-6 weeks before full effect may be noticed  Usually covered up to every 6 months by insurance, but does not need to be repeated unless pain returns, at which point we would re-evaluate  Potential use of CSI in future for flares of pain reviewed in detail  Encouraged modified progressive pain-free activity as tolerated  Expectations and goals of CSI reviewed  Often 2-3 days for steroid effect, and can take up to two weeks for maximum effect  We discussed modified progressive pain-free activity as tolerated  Do not overuse in first two weeks if feeling better due to concern for vulnerability while steroid is working  Supportive care reviewed  All questions were answered  today  Contact us with additional questions or concerns  Signs and sx of concern reviewed      Leroy Peacock DO, MANI  Sports Medicine Physician  [facility]          Again, thank you for allowing me to participate in the care of your patient.        Sincerely,        Leroy Peacock DO

## 2023-04-24 ENCOUNTER — HOSPITAL ENCOUNTER (OUTPATIENT)
Dept: MAMMOGRAPHY | Facility: CLINIC | Age: 73
Discharge: HOME | End: 2023-04-24
Attending: INTERNAL MEDICINE
Payer: COMMERCIAL

## 2023-04-24 ENCOUNTER — ANCILLARY ORDERS (OUTPATIENT)
Dept: FAMILY MEDICINE | Facility: CLINIC | Age: 73
End: 2023-04-24

## 2023-04-24 DIAGNOSIS — N64.89 BREAST ASYMMETRY: ICD-10-CM

## 2023-04-24 DIAGNOSIS — R92.8 OTHER ABNORMAL AND INCONCLUSIVE FINDINGS ON DIAGNOSTIC IMAGING OF BREAST: ICD-10-CM

## 2023-04-24 PROCEDURE — 19083 BX BREAST 1ST LESION US IMAG: CPT | Mod: LT

## 2023-04-24 PROCEDURE — 250N000009 HC RX 250: Performed by: INTERNAL MEDICINE

## 2023-04-24 PROCEDURE — 999N000065 MA POST PROCEDURE LEFT

## 2023-04-24 PROCEDURE — 88305 TISSUE EXAM BY PATHOLOGIST: CPT | Mod: TC | Performed by: INTERNAL MEDICINE

## 2023-04-24 PROCEDURE — A4648 IMPLANTABLE TISSUE MARKER: HCPCS

## 2023-04-24 RX ADMIN — LIDOCAINE HYDROCHLORIDE 10 ML: 10 SOLUTION INTRAVENOUS at 14:16

## 2023-04-27 ENCOUNTER — TELEPHONE (OUTPATIENT)
Dept: MAMMOGRAPHY | Facility: CLINIC | Age: 73
End: 2023-04-27
Payer: COMMERCIAL

## 2023-04-27 NOTE — TELEPHONE ENCOUNTER
I called pt, confirming pt identity by name and , to inform pt that 23 breast biopsy pathology is pending [facility] consultation, and results are typically back in approx 7-10 business days.     I reassured pt the return of the pathology results is monitored by Breast Center RNs and we will call pt when results are available.     Pt appreciative of call with update on pending pathology.     Pt states she is healing well from her breast biopsy, no problems. Pt states bruising is present, still in purple color stage, but not getting bigger or more painful. Pt will monitor breast for further healing and I instructed pt to see PCP if symptoms of concern appear. Pt verbalizes understanding and acceptance of plan.     Aruna Rico, RN, BSN, CBCN  [facility] [place]

## 2023-05-03 ENCOUNTER — TELEPHONE (OUTPATIENT)
Dept: MAMMOGRAPHY | Facility: CLINIC | Age: 73
End: 2023-05-03
Payer: COMMERCIAL

## 2023-05-03 LAB
PATH REPORT.ADDENDUM SPEC: NORMAL
PATH REPORT.ADDENDUM SPEC: NORMAL
PATH REPORT.COMMENTS IMP SPEC: NORMAL
PATH REPORT.FINAL DX SPEC: NORMAL
PATH REPORT.GROSS SPEC: NORMAL
PATH REPORT.MICROSCOPIC SPEC OTHER STN: NORMAL
PATH REPORT.RELEVANT HX SPEC: NORMAL
PHOTO IMAGE: NORMAL

## 2023-05-03 PROCEDURE — 88342 IMHCHEM/IMCYTCHM 1ST ANTB: CPT | Mod: 26 | Performed by: PATHOLOGY

## 2023-05-03 PROCEDURE — 88360 TUMOR IMMUNOHISTOCHEM/MANUAL: CPT | Mod: 26 | Performed by: PATHOLOGY

## 2023-05-03 PROCEDURE — 88305 TISSUE EXAM BY PATHOLOGIST: CPT | Mod: 26 | Performed by: PATHOLOGY

## 2023-05-03 PROCEDURE — 88341 IMHCHEM/IMCYTCHM EA ADD ANTB: CPT | Mod: 26 | Performed by: PATHOLOGY

## 2023-05-03 NOTE — TELEPHONE ENCOUNTER
Following Radiologist, Dr Sarahi Blake's review of 4/24/23, left breast biopsy pathology and imaging, RN informed patient of the benign pathology results and Radiologist's recommendation to follow up in 6mos with diagnostic mammogram and breast ultrasound.    Pt verbalizes understanding of information provided during this call and acceptance of plan.    RN will route this note to ordering provider, Dr Golden Christensen.    Aruna Rico, ZACHARY, BSN, [facility]  [facility]

## 2023-07-31 DIAGNOSIS — I10 HYPERTENSION GOAL BP (BLOOD PRESSURE) < 140/90: ICD-10-CM

## 2023-08-01 RX ORDER — METOPROLOL SUCCINATE 100 MG/1
100 TABLET, EXTENDED RELEASE ORAL DAILY
Qty: 90 TABLET | Refills: 3 | Status: SHIPPED | OUTPATIENT
Start: 2023-08-01 | End: 2024-07-12

## 2023-08-01 NOTE — TELEPHONE ENCOUNTER
"Routing refill request to provider for review/approval because:  Blood pressure failed    Last Written Prescription Date:  8/16/22  Last Fill Quantity: 90,  # refills: 3   Last office visit provider:  12/29/22     Requested Prescriptions   Pending Prescriptions Disp Refills    metoprolol succinate ER (TOPROL XL) 100 MG 24 hr tablet 90 tablet 3     Sig: Take 1 tablet (100 mg) by mouth daily       Beta-Blockers Protocol Failed - 8/1/2023 12:50 PM        Failed - Blood pressure under 140/90 in past 12 months     BP Readings from Last 3 Encounters:   12/29/22 (!) 146/92   04/26/22 (!) 142/91   03/11/22 (!) 163/92                 Passed - Patient is age 6 or older        Passed - Recent (12 mo) or future (30 days) visit within the authorizing provider's specialty     Patient has had an office visit with the authorizing provider or a provider within the authorizing providers department within the previous 12 mos or has a future within next 30 days. See \"Patient Info\" tab in inbasket, or \"Choose Columns\" in Meds & Orders section of the refill encounter.              Passed - Medication is active on med list             Maryuri Evans RN 08/01/23 12:50 PM  "

## 2023-10-18 NOTE — PATIENT INSTRUCTIONS - HE
1. Please decrease your Gabapentin to 300 mg three times a day.  2. Decrease Labetalol to 100 mg twice a day for 14 days, then 1/2 a tablet (50 mg) twice a day for 14 days then discontinue.  3. Start Metoprolol 50 mg every morning for 14 days, and then take 100 mg every morning and continue with this until you see me again.   Recent lab work reviewed   History of elevated lipid panel   Appears genetic  Reports intolerant to statin-myalgia   Cholesterol today: Total 260;   Agreeable to referral to cardiology for risk evaluation and potential alternate treatment options

## 2023-11-06 ENCOUNTER — TELEPHONE (OUTPATIENT)
Dept: FAMILY MEDICINE | Facility: CLINIC | Age: 73
End: 2023-11-06
Payer: COMMERCIAL

## 2023-11-06 DIAGNOSIS — N63.20 MASS OF LEFT BREAST, UNSPECIFIED QUADRANT: Primary | ICD-10-CM

## 2023-11-06 DIAGNOSIS — R92.2 INCONCLUSIVE MAMMOGRAM: ICD-10-CM

## 2023-11-06 NOTE — PROGRESS NOTES
Andree Trujillo  :  1950  DOS: 11/10/2023  MRN: [id number]    Sports Medicine Clinic Visit    PCP: Yani Miller    Andree Trujillo is a 70 year old female who is seen in follow-up presenting with chronic left knee and right hip pain.    Interim History - 2023  Since last visit on 3/11/2022, patient has increased pain in the knee,more so than her hip. Last synvisc injection gave relief for around a year in the knee. Both knee and hip are starting to bother her again.  No interim injury.       Interim History - 2021  Since last visit on 2021 patient has moderate-severe left knee and right hip pain.  Left knee steroid injection completed on 2020 by Sin Navarrete PA-C provided good relief for ~ 3 months.  Right hip intra-articular steroid injection was last completed 21 with good relief for ~ 3 months.  Patient notes that hip and knee pain are worse with walking and going from sit to stand.  She is also noting generalized lower lumbar spine pain with walking and bending at the waist.  Currently treating with OTC pain medication with only mild relief.  No new injury in the interim.    Social History: retired    Interim History - 2022  Since last visit on 2021 patient has noticed an increase in medial left knee pain.  Left knee corticosteroid injection completed on 2021 provided 6 months of great relief and pain has returned over the past month and a half. She takes Advil. Moments of instability noted and denies swelling and tingling. She is interested in a repeat corticosteroid injection today.  No new injury in the interim.    Interim History - November 10, 2023  Since last visit on 2023 patient has recurrent pain in the left knee and right hip region.  Prior injections were helpful and interested in repeat options today, though also endorsing new posterior hip pain on the right.  No interim injury.       Review of Systems  Musculoskeletal: as  above  Remainder of review of systems is negative including constitutional, CV, pulmonary, GI, Skin and Neurologic except as noted in HPI or medical history.    Past Medical History:   Diagnosis Date    Allergies     Anxiety     Cyst of breast     Essential hypertension     Osteoarthritis of spine with radiculopathy, lumbar region     Osteopenia     Right hip pain     Right knee pain     Spinal stenosis of lumbar region with neurogenic claudication     Tendinitis of both feet      Past Surgical History:   Procedure Laterality Date    HYSTERECTOMY TOTAL ABDOMINAL      11 years ago. Benign ovarian tumor. Everything removed.    HYSTERECTOMY, PAP NO LONGER INDICATED  2009    Total hysterectomy with bilateral oophorectomy due to Pelvic pain     Family History   Problem Relation Age of Onset    Breast Cancer Mother     Arthritis Mother     Hypertension Father     Alcohol/Drug Father     Arthritis Father     Cerebrovascular Disease Maternal Grandmother     Cancer Maternal Grandmother     Cancer Maternal Grandfather     Hypertension Paternal Grandfather     Hypertension Brother     Prostate Cancer Brother     Alcohol/Drug Brother     Allergies Brother     Diabetes Paternal Aunt     Uterine Cancer Sister     Coronary Artery Disease No family hx of        Objective  BP (!) 163/92   Pulse 67   Wt 83.6 kg (184 lb 4.8 oz)   BMI 31.15 kg/m      General: healthy, alert and in no distress    HEENT: no scleral icterus or conjunctival erythema   Skin: no suspicious lesions or rash. No jaundice.   CV: regular rhythm by palpation, 2+ distal pulses, no pedal edema    Resp: normal respiratory effort without conversational dyspnea   Psych: normal mood and affect    Gait: mildly antalgic, appropriate coordination and balance   Neuro: normal light touch sensory exam of the extremities. Motor strength as noted below     Left Knee exam    ROM:        Flexion 130 degrees       Extension -2 degrees       Range of motion limited by pain in  terminal flexion > extension    Inspection:       no visible ecchymosis        effusion noted trace    Skin:       no visible deformities       well perfused       capillary refill brisk    Patellar Motion:        Crepitus noted in the patellofemoral joint    Tender:        medial joint line       lateral joint line    Non Tender:         remainder of knee area        along MCL        distal IT Band        infrapatellar tendon        tibial tubercle       pes anserine bursa    Special Tests:        neg (-) varus at 0 deg and 30 deg       neg (-) valgus at 0 deg and 30 deg    Right hip exam     Inspection:        no edema or ecchymosis in hip area     ROM:       Flexion 120       internal rotation 20      external rotation 40      Range of motion limited by pain     Strength:        flexion 5-/5       extension 5/5       abduction 5-/5       adduction 5-/5     Tender:        greater trochanter moderate       Anterior hip joint        Lumbar paraspinals mild       Gluteal muscles, glute med and pirformis     Non Tender:        remainder of hip area       illiac crest       ASIS       pubis     Sensation:        grossly intact in hip and thigh     Skin:       well perfused       capillary refill brisk     Special Tests:        neg (-) NATI       positive (+) FADIR       equivocal scour, neg Albert    Large Joint Injection/Arthocentesis: L knee joint    Date/Time: 11/10/2023 4:13 PM    Performed by: Leroy Peaccok DO  Authorized by: Leroy Peacock DO    Indications:  Osteoarthritis  Needle Size:  21 G  Guidance: ultrasound    Approach:  Superolateral  Location:  Knee      Medications:  48 mg hylan 48 MG/6ML  Outcome:  Tolerated well, no immediate complications  Procedure discussed: discussed risks, benefits, and alternatives    Consent Given by:  Patient  Timeout: timeout called immediately prior to procedure    Prep: patient was prepped and draped in usual sterile fashion     Ultrasound images of  procedure were permanently stored.  Large Joint Injection/Arthocentesis: R hip joint    Date/Time: 11/10/2023 4:13 PM    Performed by: Leroy Peacock DO  Authorized by: Leroy Peacock DO    Indications:  Pain and diagnostic evaluation  Needle Size:  22 G  Guidance: ultrasound    Approach:  Anterior  Location:  Hip      Site:  R hip joint  Medications:  40 mg triamcinolone 40 MG/ML; 3 mL ROPivacaine 5 MG/ML  Outcome:  Tolerated well, no immediate complications  Procedure discussed: discussed risks, benefits, and alternatives    Consent Given by:  Patient  Timeout: timeout called immediately prior to procedure    Prep: patient was prepped and draped in usual sterile fashion     4 ml's of 1% lidocaine was used as local anesthetic prior to injection    Ultrasound images of procedure were permanently stored.         Radiology  HIP RIGHT 2-3 VIEWS   12/2/2020 11:05 AM      HISTORY: Hip pain, right.     FINDINGS: Lower lumbar spine degenerative change.                                                                      IMPRESSION:   1. Mild femoral head osteophytosis consistent with early  osteoarthritis.  2. There is a acetabular pincer-type configuration. This can  predispose to femoroacetabular impingement, and clinical correlation  is recommended.    KNEE LEFT THREE VIEWS December 2, 2020 11:04 AM      HISTORY: Left knee pain, unspecified chronicity.                                                                      IMPRESSION:   1. Slight lateral patellar subluxation.  2. I cannot exclude one or two loose bodies in the notch region.  3. No fracture identified.  4. Small asymmetric dense area within the tibial proximal diaphysis.  This could represent a normal variant area of focal trabeculation, but  a small benign enchondroma or chronic bone infarct cannot be excluded.       Assessment:  (M17.12) Primary osteoarthritis of left knee  (primary encounter diagnosis)  (M16.11) Primary osteoarthritis  of right hip  (M76.02) Gluteal tendinitis of left buttock    Plan:  Discussed the assessment with the patient.  Follow up: As needed based on clinical progress, plan for 3 weeks if relief is incomplete in hip region  Left knee arthritis, likely compensatory component between the right hip and left knee, reviewed in detail again today  Right hip joint injection helped significantly last visit, having similar groin pain as well as posterior and lateral hip pain, consistent with some measure of trochanteric bursitis and gluteal tendinitis  No convincing signs of radicular issues, encouraging  Reviewed option for alternative injection location for diagnostic value, GTB, deferred for now, can revisit if not significantly improved after 3 weeks  Repeat US guided right hip joint CSI today, will pay close attention to evolution of sx in hip area over the next few weeks  XR images independently visualized and reviewed with patient again today in clinic  Physical therapy options and low impact impact activity strategies to build core strength reviewed in detail today  Reviewed options for potential steroid vs viscosupplementation injections and the possibility for future orthopedic referral prn for the knee  Repeat US guided viscosupplementation today, can repeat CSI in the future if needed based on clinical progress over the next 4 weeks  Repeat Us guided right hip joint CSI as well  Reviewed safe and appropriate OTC medication choices, try tylenol first  Up to 3000mg daily of tylenol is generally safe, NSAID dosing and duration limitations reviewed  Discussed nature of degenerative arthrosis of the knee.   Discussed symptom treatment with ice or heat, topical treatments, and rest if needed.   Expectations and limitations of synvisc were reviewed in detail  Often 4-6 weeks before full effect may be noticed  Usually covered up to every 6 months by insurance, but does not need to be repeated unless pain returns, at which point  we would re-evaluate  Potential use of CSI in future for flares of pain reviewed in detail  Encouraged modified progressive pain-free activity as tolerated  Expectations and goals of CSI reviewed  Often 2-3 days for steroid effect, and can take up to two weeks for maximum effect  We discussed modified progressive pain-free activity as tolerated  Do not overuse in first two weeks if feeling better due to concern for vulnerability while steroid is working  Supportive care reviewed  All questions were answered today  Contact us with additional questions or concerns  Signs and sx of concern reviewed      Leroy Peacock DO, CAQ  Sports Medicine Physician  [facility]

## 2023-11-07 NOTE — TELEPHONE ENCOUNTER
Please call patient and let her know that due for repeat mammogram and ultrasound- let her know to call [facility] [phone] to arrange your study.

## 2023-11-07 NOTE — TELEPHONE ENCOUNTER
----- Message from Golden Christensen MD sent at 5/4/2023 10:01 PM CDT -----  Repeat breast imaging per breast center recommendations

## 2023-11-08 DIAGNOSIS — E78.5 HYPERLIPIDEMIA LDL GOAL <100: ICD-10-CM

## 2023-11-08 DIAGNOSIS — I10 ESSENTIAL HYPERTENSION: ICD-10-CM

## 2023-11-08 RX ORDER — ATORVASTATIN CALCIUM 10 MG/1
10 TABLET, FILM COATED ORAL DAILY
Qty: 90 TABLET | Refills: 3 | Status: SHIPPED | OUTPATIENT
Start: 2023-11-08 | End: 2024-03-21

## 2023-11-08 RX ORDER — HYDROCHLOROTHIAZIDE 12.5 MG/1
CAPSULE ORAL
Qty: 90 CAPSULE | Refills: 1 | Status: SHIPPED | OUTPATIENT
Start: 2023-11-08 | End: 2024-05-01

## 2023-11-10 ENCOUNTER — OFFICE VISIT (OUTPATIENT)
Dept: ORTHOPEDICS | Facility: CLINIC | Age: 73
End: 2023-11-10
Payer: COMMERCIAL

## 2023-11-10 VITALS — BODY MASS INDEX: 30.95 KG/M2 | WEIGHT: 186 LBS | HEART RATE: 80 BPM | OXYGEN SATURATION: 95 %

## 2023-11-10 DIAGNOSIS — M16.11 PRIMARY OSTEOARTHRITIS OF RIGHT HIP: ICD-10-CM

## 2023-11-10 DIAGNOSIS — M17.12 PRIMARY OSTEOARTHRITIS OF LEFT KNEE: Primary | ICD-10-CM

## 2023-11-10 DIAGNOSIS — M76.02 GLUTEAL TENDINITIS OF LEFT BUTTOCK: ICD-10-CM

## 2023-11-10 PROCEDURE — 20611 DRAIN/INJ JOINT/BURSA W/US: CPT | Mod: 50 | Performed by: FAMILY MEDICINE

## 2023-11-10 PROCEDURE — 2894A LARGE JOINT INJECTION/ARTHOCENTESIS: L KNEE JOINT: CPT | Mod: LT | Performed by: FAMILY MEDICINE

## 2023-11-10 PROCEDURE — 2894A LARGE JOINT INJECTION/ARTHOCENTESIS: R HIP JOINT: CPT | Mod: RT | Performed by: FAMILY MEDICINE

## 2023-11-10 RX ORDER — ROPIVACAINE HYDROCHLORIDE 5 MG/ML
3 INJECTION, SOLUTION EPIDURAL; INFILTRATION; PERINEURAL
Status: SHIPPED | OUTPATIENT
Start: 2023-11-10

## 2023-11-10 RX ORDER — TRIAMCINOLONE ACETONIDE 40 MG/ML
40 INJECTION, SUSPENSION INTRA-ARTICULAR; INTRAMUSCULAR
Status: SHIPPED | OUTPATIENT
Start: 2023-11-10

## 2023-11-10 RX ADMIN — ROPIVACAINE HYDROCHLORIDE 3 ML: 5 INJECTION, SOLUTION EPIDURAL; INFILTRATION; PERINEURAL at 16:13

## 2023-11-10 RX ADMIN — TRIAMCINOLONE ACETONIDE 40 MG: 40 INJECTION, SUSPENSION INTRA-ARTICULAR; INTRAMUSCULAR at 16:13

## 2023-11-10 NOTE — LETTER
11/10/2023         RE: Andree Trujillo  [address] [facility] [address]        Dear Colleague,    Thank you for referring your patient, Andree Trujillo, to the [facility]. Please see a copy of my visit note below.    Andree Trujillo  :  1950  DOS: 11/10/2023  MRN: [id number]    Sports Medicine Clinic Visit    PCP: Yani Miller    Andree Trujillo is a 70 year old female who is seen in follow-up presenting with chronic left knee and right hip pain.    Interim History - 2023  Since last visit on 3/11/2022, patient has increased pain in the knee,more so than her hip. Last synvisc injection gave relief for around a year in the knee. Both knee and hip are starting to bother her again.  No interim injury.       Interim History - 2021  Since last visit on 2021 patient has moderate-severe left knee and right hip pain.  Left knee steroid injection completed on 2020 by Sin Navarrete PA-C provided good relief for ~ 3 months.  Right hip intra-articular steroid injection was last completed 21 with good relief for ~ 3 months.  Patient notes that hip and knee pain are worse with walking and going from sit to stand.  She is also noting generalized lower lumbar spine pain with walking and bending at the waist.  Currently treating with OTC pain medication with only mild relief.  No new injury in the interim.    Social History: retired    Interim History - 2022  Since last visit on 2021 patient has noticed an increase in medial left knee pain.  Left knee corticosteroid injection completed on 2021 provided 6 months of great relief and pain has returned over the past month and a half. She takes Advil. Moments of instability noted and denies swelling and tingling. She is interested in a repeat corticosteroid injection today.  No new injury in the interim.    Interim History - November 10, 2023  Since last visit on 2023  patient has recurrent pain in the left knee and right hip region.  Prior injections were helpful and interested in repeat options today, though also endorsing new posterior hip pain on the right.  No interim injury.       Review of Systems  Musculoskeletal: as above  Remainder of review of systems is negative including constitutional, CV, pulmonary, GI, Skin and Neurologic except as noted in HPI or medical history.    Past Medical History:   Diagnosis Date     Allergies      Anxiety      Cyst of breast      Essential hypertension      Osteoarthritis of spine with radiculopathy, lumbar region      Osteopenia      Right hip pain      Right knee pain      Spinal stenosis of lumbar region with neurogenic claudication      Tendinitis of both feet      Past Surgical History:   Procedure Laterality Date     HYSTERECTOMY TOTAL ABDOMINAL      11 years ago. Benign ovarian tumor. Everything removed.     HYSTERECTOMY, PAP NO LONGER INDICATED  2009    Total hysterectomy with bilateral oophorectomy due to Pelvic pain     Family History   Problem Relation Age of Onset     Breast Cancer Mother      Arthritis Mother      Hypertension Father      Alcohol/Drug Father      Arthritis Father      Cerebrovascular Disease Maternal Grandmother      Cancer Maternal Grandmother      Cancer Maternal Grandfather      Hypertension Paternal Grandfather      Hypertension Brother      Prostate Cancer Brother      Alcohol/Drug Brother      Allergies Brother      Diabetes Paternal Aunt      Uterine Cancer Sister      Coronary Artery Disease No family hx of        Objective  BP (!) 163/92   Pulse 67   Wt 83.6 kg (184 lb 4.8 oz)   BMI 31.15 kg/m      General: healthy, alert and in no distress    HEENT: no scleral icterus or conjunctival erythema   Skin: no suspicious lesions or rash. No jaundice.   CV: regular rhythm by palpation, 2+ distal pulses, no pedal edema    Resp: normal respiratory effort without conversational dyspnea   Psych: normal mood  and affect    Gait: mildly antalgic, appropriate coordination and balance   Neuro: normal light touch sensory exam of the extremities. Motor strength as noted below     Left Knee exam    ROM:        Flexion 130 degrees       Extension -2 degrees       Range of motion limited by pain in terminal flexion > extension    Inspection:       no visible ecchymosis        effusion noted trace    Skin:       no visible deformities       well perfused       capillary refill brisk    Patellar Motion:        Crepitus noted in the patellofemoral joint    Tender:        medial joint line       lateral joint line    Non Tender:         remainder of knee area        along MCL        distal IT Band        infrapatellar tendon        tibial tubercle       pes anserine bursa    Special Tests:        neg (-) varus at 0 deg and 30 deg       neg (-) valgus at 0 deg and 30 deg    Right hip exam     Inspection:        no edema or ecchymosis in hip area     ROM:       Flexion 120       internal rotation 20      external rotation 40      Range of motion limited by pain     Strength:        flexion 5-/5       extension 5/5       abduction 5-/5       adduction 5-/5     Tender:        greater trochanter moderate       Anterior hip joint        Lumbar paraspinals mild       Gluteal muscles, glute med and pirformis     Non Tender:        remainder of hip area       illiac crest       ASIS       pubis     Sensation:        grossly intact in hip and thigh     Skin:       well perfused       capillary refill brisk     Special Tests:        neg (-) NATI       positive (+) FADIR       equivocal scour, neg Albert    Large Joint Injection/Arthocentesis: L knee joint    Date/Time: 11/10/2023 4:13 PM    Performed by: Leroy Peacock DO  Authorized by: Leroy Peacock DO    Indications:  Osteoarthritis  Needle Size:  21 G  Guidance: ultrasound    Approach:  Superolateral  Location:  Knee      Medications:  48 mg hylan 48 MG/6ML  Outcome:   Tolerated well, no immediate complications  Procedure discussed: discussed risks, benefits, and alternatives    Consent Given by:  Patient  Timeout: timeout called immediately prior to procedure    Prep: patient was prepped and draped in usual sterile fashion     Ultrasound images of procedure were permanently stored.  Large Joint Injection/Arthocentesis: R hip joint    Date/Time: 11/10/2023 4:13 PM    Performed by: Leroy Peacock DO  Authorized by: Leroy Peacock DO    Indications:  Pain and diagnostic evaluation  Needle Size:  22 G  Guidance: ultrasound    Approach:  Anterior  Location:  Hip      Site:  R hip joint  Medications:  40 mg triamcinolone 40 MG/ML; 3 mL ROPivacaine 5 MG/ML  Outcome:  Tolerated well, no immediate complications  Procedure discussed: discussed risks, benefits, and alternatives    Consent Given by:  Patient  Timeout: timeout called immediately prior to procedure    Prep: patient was prepped and draped in usual sterile fashion     4 ml's of 1% lidocaine was used as local anesthetic prior to injection    Ultrasound images of procedure were permanently stored.         Radiology  HIP RIGHT 2-3 VIEWS   12/2/2020 11:05 AM      HISTORY: Hip pain, right.     FINDINGS: Lower lumbar spine degenerative change.                                                                      IMPRESSION:   1. Mild femoral head osteophytosis consistent with early  osteoarthritis.  2. There is a acetabular pincer-type configuration. This can  predispose to femoroacetabular impingement, and clinical correlation  is recommended.    KNEE LEFT THREE VIEWS December 2, 2020 11:04 AM      HISTORY: Left knee pain, unspecified chronicity.                                                                      IMPRESSION:   1. Slight lateral patellar subluxation.  2. I cannot exclude one or two loose bodies in the notch region.  3. No fracture identified.  4. Small asymmetric dense area within the tibial  proximal diaphysis.  This could represent a normal variant area of focal trabeculation, but  a small benign enchondroma or chronic bone infarct cannot be excluded.       Assessment:  (M17.12) Primary osteoarthritis of left knee  (primary encounter diagnosis)  (M16.11) Primary osteoarthritis of right hip  (M76.02) Gluteal tendinitis of left buttock    Plan:  Discussed the assessment with the patient.  Follow up: As needed based on clinical progress, plan for 3 weeks if relief is incomplete in hip region  Left knee arthritis, likely compensatory component between the right hip and left knee, reviewed in detail again today  Right hip joint injection helped significantly last visit, having similar groin pain as well as posterior and lateral hip pain, consistent with some measure of trochanteric bursitis and gluteal tendinitis  No convincing signs of radicular issues, encouraging  Reviewed option for alternative injection location for diagnostic value, GTB, deferred for now, can revisit if not significantly improved after 3 weeks  Repeat US guided right hip joint CSI today, will pay close attention to evolution of sx in hip area over the next few weeks  XR images independently visualized and reviewed with patient again today in clinic  Physical therapy options and low impact impact activity strategies to build core strength reviewed in detail today  Reviewed options for potential steroid vs viscosupplementation injections and the possibility for future orthopedic referral prn for the knee  Repeat US guided viscosupplementation today, can repeat CSI in the future if needed based on clinical progress over the next 4 weeks  Repeat Us guided right hip joint CSI as well  Reviewed safe and appropriate OTC medication choices, try tylenol first  Up to 3000mg daily of tylenol is generally safe, NSAID dosing and duration limitations reviewed  Discussed nature of degenerative arthrosis of the knee.   Discussed symptom treatment  with ice or heat, topical treatments, and rest if needed.   Expectations and limitations of synvisc were reviewed in detail  Often 4-6 weeks before full effect may be noticed  Usually covered up to every 6 months by insurance, but does not need to be repeated unless pain returns, at which point we would re-evaluate  Potential use of CSI in future for flares of pain reviewed in detail  Encouraged modified progressive pain-free activity as tolerated  Expectations and goals of CSI reviewed  Often 2-3 days for steroid effect, and can take up to two weeks for maximum effect  We discussed modified progressive pain-free activity as tolerated  Do not overuse in first two weeks if feeling better due to concern for vulnerability while steroid is working  Supportive care reviewed  All questions were answered today  Contact us with additional questions or concerns  Signs and sx of concern reviewed      Leroy Peacock DO, CAQ  Sports Medicine Physician  [facility]        Again, thank you for allowing me to participate in the care of your patient.        Sincerely,        Leroy Peacock DO

## 2023-11-15 ENCOUNTER — TELEPHONE (OUTPATIENT)
Dept: FAMILY MEDICINE | Facility: CLINIC | Age: 73
End: 2023-11-15
Payer: COMMERCIAL

## 2023-11-15 NOTE — TELEPHONE ENCOUNTER
General Call    Contacts         Type Contact Phone/Fax    11/15/2023 09:36 AM CST Phone (Incoming) Helen Trujillo (Self) [phone] (H)          Reason for Call:   Pt's handicap sticker is up for renewal and expires in December, wondering if Dr Christensen can help re-issue a new one? Does pt need an appt? How does process work? Please call pt back    Okay to leave a detailed message?: No at Cell number on file:    Telephone Information:   Mobile [phone]

## 2023-11-17 NOTE — TELEPHONE ENCOUNTER
LVM for patient to call back or she can pick it up at the clinic. Please rely message to pt if she calls back. The form is at my desk.     Danish Shen MA

## 2023-11-29 ENCOUNTER — PATIENT OUTREACH (OUTPATIENT)
Dept: CARE COORDINATION | Facility: CLINIC | Age: 73
End: 2023-11-29
Payer: COMMERCIAL

## 2023-12-13 ENCOUNTER — PATIENT OUTREACH (OUTPATIENT)
Dept: CARE COORDINATION | Facility: CLINIC | Age: 73
End: 2023-12-13
Payer: COMMERCIAL

## 2023-12-21 DIAGNOSIS — I10 ESSENTIAL HYPERTENSION: ICD-10-CM

## 2023-12-21 DIAGNOSIS — M51.369 DDD (DEGENERATIVE DISC DISEASE), LUMBAR: ICD-10-CM

## 2023-12-21 DIAGNOSIS — G25.81 RESTLESS LEG SYNDROME: ICD-10-CM

## 2023-12-21 RX ORDER — AMLODIPINE BESYLATE 5 MG/1
5 TABLET ORAL EVERY MORNING
Qty: 90 TABLET | Refills: 3 | Status: SHIPPED | OUTPATIENT
Start: 2023-12-21 | End: 2024-03-21

## 2023-12-21 RX ORDER — GABAPENTIN 300 MG/1
300-600 CAPSULE ORAL 2 TIMES DAILY PRN
Qty: 180 CAPSULE | Refills: 3 | Status: SHIPPED | OUTPATIENT
Start: 2023-12-21 | End: 2023-12-27

## 2023-12-21 RX ORDER — PRAMIPEXOLE DIHYDROCHLORIDE 0.12 MG/1
.125-.25 TABLET ORAL AT BEDTIME
Qty: 180 TABLET | Refills: 3 | Status: SHIPPED | OUTPATIENT
Start: 2023-12-21

## 2023-12-27 ENCOUNTER — TELEPHONE (OUTPATIENT)
Dept: FAMILY MEDICINE | Facility: CLINIC | Age: 73
End: 2023-12-27
Payer: COMMERCIAL

## 2023-12-27 DIAGNOSIS — M51.369 DDD (DEGENERATIVE DISC DISEASE), LUMBAR: ICD-10-CM

## 2023-12-27 RX ORDER — GABAPENTIN 300 MG/1
300-600 CAPSULE ORAL 2 TIMES DAILY PRN
Qty: 360 CAPSULE | Refills: 3 | Status: SHIPPED | OUTPATIENT
Start: 2023-12-27 | End: 2023-12-29

## 2023-12-27 NOTE — TELEPHONE ENCOUNTER
General Call    Contacts         Type Contact Phone/Fax    12/27/2023 09:06 AM CST Phone (Incoming) Helen Trujillo (Self) [phone] (M)          Reason for Call:  Patient is requesting gabapentin (NEURONTIN) 300 MG capsule - patient says she only got 180 capsules for one month, needs 2 more month worth of this medication. She says she should've gotten 90 days worth of the medication. I told her she had active refills... But pt was adamant about 90 days total of medication. Please advise. I am unsure what she is asking for. She says she usually gets the 90 day supply together.     Okay to leave a detailed message?: No at Cell number on file:    Telephone Information:   Mobile [phone]

## 2023-12-28 ENCOUNTER — TELEPHONE (OUTPATIENT)
Dept: FAMILY MEDICINE | Facility: CLINIC | Age: 73
End: 2023-12-28
Payer: COMMERCIAL

## 2023-12-28 DIAGNOSIS — M51.369 DDD (DEGENERATIVE DISC DISEASE), LUMBAR: ICD-10-CM

## 2023-12-28 NOTE — TELEPHONE ENCOUNTER
Message from [facility]: 2 sets of instructions. Please Advise!    Medication: gabapentin (NEURONTIN) 300 MG capsule       Take 1-2 capsules (300-600 mg) by mouth 2 times daily as needed for neuropathic pain TAKE 2 CAPSULES BY MOUTH THREE TIMES DAILY

## 2023-12-29 RX ORDER — GABAPENTIN 300 MG/1
300-600 CAPSULE ORAL 3 TIMES DAILY
Qty: 360 CAPSULE | Refills: 3 | Status: SHIPPED | OUTPATIENT
Start: 2023-12-29 | End: 2024-03-21

## 2024-01-11 ENCOUNTER — HOSPITAL ENCOUNTER (OUTPATIENT)
Dept: MAMMOGRAPHY | Facility: CLINIC | Age: 74
Discharge: HOME | End: 2024-01-11
Attending: INTERNAL MEDICINE | Admitting: INTERNAL MEDICINE
Payer: COMMERCIAL

## 2024-01-11 DIAGNOSIS — N63.20 MASS OF LEFT BREAST, UNSPECIFIED QUADRANT: ICD-10-CM

## 2024-01-11 DIAGNOSIS — R92.2 INCONCLUSIVE MAMMOGRAM: ICD-10-CM

## 2024-01-11 PROCEDURE — 77062 BREAST TOMOSYNTHESIS BI: CPT

## 2024-03-21 ENCOUNTER — OFFICE VISIT (OUTPATIENT)
Dept: FAMILY MEDICINE | Facility: CLINIC | Age: 74
End: 2024-03-21
Payer: COMMERCIAL

## 2024-03-21 ENCOUNTER — ORDERS ONLY (AUTO-RELEASED) (OUTPATIENT)
Dept: FAMILY MEDICINE | Facility: CLINIC | Age: 74
End: 2024-03-21

## 2024-03-21 VITALS
RESPIRATION RATE: 16 BRPM | HEIGHT: 65 IN | HEART RATE: 80 BPM | OXYGEN SATURATION: 96 % | SYSTOLIC BLOOD PRESSURE: 154 MMHG | BODY MASS INDEX: 31.82 KG/M2 | WEIGHT: 191 LBS | TEMPERATURE: 97.1 F | DIASTOLIC BLOOD PRESSURE: 92 MMHG

## 2024-03-21 DIAGNOSIS — Z12.11 SCREEN FOR COLON CANCER: ICD-10-CM

## 2024-03-21 DIAGNOSIS — M48.062 SPINAL STENOSIS OF LUMBAR REGION WITH NEUROGENIC CLAUDICATION: ICD-10-CM

## 2024-03-21 DIAGNOSIS — E55.9 VITAMIN D DEFICIENCY: ICD-10-CM

## 2024-03-21 DIAGNOSIS — R79.9 ABNORMAL FINDING OF BLOOD CHEMISTRY: ICD-10-CM

## 2024-03-21 DIAGNOSIS — E78.00 HYPERCHOLESTEROLEMIA: ICD-10-CM

## 2024-03-21 DIAGNOSIS — Z00.00 ENCOUNTER FOR MEDICARE ANNUAL WELLNESS EXAM: ICD-10-CM

## 2024-03-21 DIAGNOSIS — M54.50 LUMBAR BACK PAIN: ICD-10-CM

## 2024-03-21 DIAGNOSIS — E78.5 HYPERLIPIDEMIA LDL GOAL <100: ICD-10-CM

## 2024-03-21 DIAGNOSIS — M51.369 DDD (DEGENERATIVE DISC DISEASE), LUMBAR: ICD-10-CM

## 2024-03-21 DIAGNOSIS — Z12.11 SCREEN FOR COLON CANCER: Primary | ICD-10-CM

## 2024-03-21 DIAGNOSIS — M79.10 MUSCLE ACHE: ICD-10-CM

## 2024-03-21 DIAGNOSIS — H61.21 IMPACTED CERUMEN OF RIGHT EAR: ICD-10-CM

## 2024-03-21 DIAGNOSIS — I10 ESSENTIAL HYPERTENSION: ICD-10-CM

## 2024-03-21 LAB — HBA1C MFR BLD: 5.7 % (ref 0–5.6)

## 2024-03-21 PROCEDURE — 80061 LIPID PANEL: CPT | Performed by: INTERNAL MEDICINE

## 2024-03-21 PROCEDURE — 69209 REMOVE IMPACTED EAR WAX UNI: CPT | Mod: RT | Performed by: INTERNAL MEDICINE

## 2024-03-21 PROCEDURE — 80053 COMPREHEN METABOLIC PANEL: CPT | Performed by: INTERNAL MEDICINE

## 2024-03-21 PROCEDURE — 99214 OFFICE O/P EST MOD 30 MIN: CPT | Mod: 25 | Performed by: INTERNAL MEDICINE

## 2024-03-21 PROCEDURE — 82306 VITAMIN D 25 HYDROXY: CPT | Performed by: INTERNAL MEDICINE

## 2024-03-21 PROCEDURE — 82607 VITAMIN B-12: CPT | Performed by: INTERNAL MEDICINE

## 2024-03-21 PROCEDURE — 83036 HEMOGLOBIN GLYCOSYLATED A1C: CPT | Performed by: INTERNAL MEDICINE

## 2024-03-21 PROCEDURE — 36415 COLL VENOUS BLD VENIPUNCTURE: CPT | Performed by: INTERNAL MEDICINE

## 2024-03-21 PROCEDURE — G0439 PPPS, SUBSEQ VISIT: HCPCS | Performed by: INTERNAL MEDICINE

## 2024-03-21 PROCEDURE — 84443 ASSAY THYROID STIM HORMONE: CPT | Performed by: INTERNAL MEDICINE

## 2024-03-21 RX ORDER — AMLODIPINE BESYLATE 10 MG/1
10 TABLET ORAL EVERY MORNING
Qty: 90 TABLET | Refills: 3 | Status: SHIPPED | OUTPATIENT
Start: 2024-03-21

## 2024-03-21 RX ORDER — GABAPENTIN 300 MG/1
900 CAPSULE ORAL 3 TIMES DAILY
Qty: 810 CAPSULE | Refills: 3 | Status: SHIPPED | OUTPATIENT
Start: 2024-03-21

## 2024-03-21 RX ORDER — RESPIRATORY SYNCYTIAL VIRUS VACCINE 120MCG/0.5
0.5 KIT INTRAMUSCULAR ONCE
Qty: 1 EACH | Refills: 0 | Status: CANCELLED | OUTPATIENT
Start: 2024-03-21 | End: 2024-03-21

## 2024-03-21 RX ORDER — ATORVASTATIN CALCIUM 10 MG/1
10 TABLET, FILM COATED ORAL DAILY
Qty: 90 TABLET | Refills: 3 | Status: SHIPPED | OUTPATIENT
Start: 2024-03-21

## 2024-03-21 SDOH — HEALTH STABILITY: PHYSICAL HEALTH: ON AVERAGE, HOW MANY DAYS PER WEEK DO YOU ENGAGE IN MODERATE TO STRENUOUS EXERCISE (LIKE A BRISK WALK)?: 0 DAYS

## 2024-03-21 ASSESSMENT — SOCIAL DETERMINANTS OF HEALTH (SDOH): HOW OFTEN DO YOU GET TOGETHER WITH FRIENDS OR RELATIVES?: THREE TIMES A WEEK

## 2024-03-21 NOTE — PATIENT INSTRUCTIONS
They should email about the medical cannabis- let me know if issues.  - [facility] is the name of the place in [place] that you should be able to go to. Check your spam filter if you don't see the email from the state.     Increase the amlodipine to 10 mg per day- watch for swelling and if develops let me know.     Increase the gabapentin to 900 mg three times per day- can increase first at night to 3 pills of the 300 mg then other doses every couple of days.     We will have you come back in for nurse only to check BLOOD PRESSURE in 1-2 weeks.     They should call to set up the MRI- we may refer to a specialist to look at the back more.     Golden Christensen MD      Preventive Care Advice   This is general advice given by our system to help you stay healthy. However, your care team may have specific advice just for you. Please talk to your care team about your preventive care needs.  Nutrition  Eat 5 or more servings of fruits and vegetables each day.  Try wheat bread, brown rice and whole grain pasta (instead of white bread, rice, and pasta).  Get enough calcium and vitamin D. Check the label on foods and aim for 100% of the RDA (recommended daily allowance).  Lifestyle  Exercise at least 150 minutes each week   (30 minutes a day, 5 days a week).  Do muscle strengthening activities 2 days a week. These help control your weight and prevent disease.  No smoking.  Wear sunscreen to prevent skin cancer.  Have a dental exam and cleaning every 6 months.  Yearly exams  See your health care team every year to talk about:  Any changes in your health.  Any medicines your care team has prescribed.  Preventive care, family planning, and ways to prevent chronic diseases.  Shots (vaccines)   HPV shots (up to age 26), if you've never had them before.  Hepatitis B shots (up to age 59), if you've never had them before.  COVID-19 shot: Get this shot when it's due.  Flu shot: Get a flu shot every year.  Tetanus shot: Get a tetanus shot  every 10 years.  Pneumococcal, hepatitis A, and RSV shots: Ask your care team if you need these based on your risk.  Shingles shot (for age 50 and up).  General health tests  Diabetes screening:  Starting at age 35, Get screened for diabetes at least every 3 years.  If you are younger than age 35, ask your care team if you should be screened for diabetes.  Cholesterol test: At age 39, start having a cholesterol test every 5 years, or more often if advised.  Bone density scan (DEXA): At age 50, ask your care team if you should have this scan for osteoporosis (brittle bones).  Hepatitis C: Get tested at least once in your life.  STIs (sexually transmitted infections)  Before age 24: Ask your care team if you should be screened for STIs.  After age 24: Get screened for STIs if you're at risk. You are at risk for STIs (including HIV) if:  You are sexually active with more than one person.  You don't use condoms every time.  You or a partner was diagnosed with a sexually transmitted infection.  If you are at risk for HIV, ask about PrEP medicine to prevent HIV.  Get tested for HIV at least once in your life, whether you are at risk for HIV or not.  Cancer screening tests  Cervical cancer screening: If you have a cervix, begin getting regular cervical cancer screening tests at age 21. Most people who have regular screenings with normal results can stop after age 65. Talk about this with your provider.  Breast cancer scan (mammogram): If you've ever had breasts, begin having regular mammograms starting at age 40. This is a scan to check for breast cancer.  Colon cancer screening: It is important to start screening for colon cancer at age 45.  Have a colonoscopy test every 10 years (or more often if you're at risk) Or, ask your provider about stool tests like a FIT test every year or Cologuard test every 3 years.  To learn more about your testing options, visit: [email or web address].  For help making a  decision, visit: https://bit.ly/sq10521.  Prostate cancer screening test: If you have a prostate and are age 55 to 69, ask your provider if you would benefit from a yearly prostate cancer screening test.  Lung cancer screening: If you are a current or former smoker age 50 to 80, ask your care team if ongoing lung cancer screenings are right for you.  For informational purposes only. Not to replace the advice of your health care provider. Copyright   2023 [employer]. All rights reserved. Clinically reviewed by the  [employer] [place] [facility]. [employer] [id number] - REV 01/24.    Learning About Activities of Daily Living  What are activities of daily living?     Activities of daily living (ADLs) are the basic self-care tasks you do every day. These include eating, bathing, dressing, and moving around.  As you age, and if you have health problems, you may find that it's harder to do some of these tasks. If so, your doctor can suggest ideas that may help.  To measure what kind of help you may need, your doctor will ask how well you are able to do ADLs. Let your doctor know if there are any tasks that you are having trouble doing. This is an important first step to getting help. And when you have the help you need, you can stay as independent as possible.  How will a doctor assess your ADLs?  Asking about ADLs is part of a routine health checkup your doctor will likely do as you age. Your health check might be done in a doctor's office, in your home, or at a hospital. The goal is to find out if you are having any problems that could make it hard to care for yourself or that make it unsafe for you to be on your own.  To measure your ADLs, your doctor will ask how hard it is for you to do routine tasks. Your doctor may also want to know if you have changed the way you do a task because of a health problem. Your doctor may watch how you:  Walk back and forth.  Keep your balance while you stand or  walk.  Move from sitting to standing or from a bed to a chair.  Button or unbutton a shirt or sweater.  Remove and put on your shoes.  It's common to feel a little worried or anxious if you find you can't do all the things you used to be able to do. Talking with your doctor about ADLs is a way to make sure you're as safe as possible and able to care for yourself as well as you can. You may want to bring a caregiver, friend, or family member to your checkup. They can help you talk to your doctor.  Follow-up care is a key part of your treatment and safety. Be sure to make and go to all appointments, and call your doctor if you are having problems. It's also a good idea to know your test results and keep a list of the medicines you take.  Current as of: October 24, 2023               Content Version: 14.0    0680-9967 [employer].   Care instructions adapted under license by your healthcare professional. If you have questions about a medical condition or this instruction, always ask your healthcare professional. [employer] disclaims any warranty or liability for your use of this information.      Preventing Falls: Care Instructions  Injuries and health problems such as trouble walking or poor eyesight can increase your risk of falling. So can some medicines. But there are things you can do to help prevent falls. You can exercise to get stronger. You can also arrange your home to make it safer.    Talk to your doctor about the medicines you take. Ask if any of them increase the risk of falls and whether they can be changed or stopped.   Try to exercise regularly. It can help improve your strength and balance. This can help lower your risk of falling.     Practice fall safety and prevention.    Wear low-heeled shoes that fit well and give your feet good support. Talk to your doctor if you have foot problems that make this hard.  Carry a cellphone or wear a medical alert device that you can use  "to call for help.  Use stepladders instead of chairs to reach high objects. Don't climb if you're at risk for falls. Ask for help, if needed.  Wear the correct eyeglasses, if you need them.    Make your home safer.    Remove rugs, cords, clutter, and furniture from walkways.  Keep your house well lit. Use night-lights in hallways and bathrooms.  Install and use sturdy handrails on stairways.  Wear nonskid footwear, even inside. Don't walk barefoot or in socks without shoes.    Be safe outside.    Use handrails, curb cuts, and ramps whenever possible.  Keep your hands free by using a shoulder bag or backpack.  Try to walk in well-lit areas. Watch out for uneven ground, changes in pavement, and debris.  Be careful in the winter. Walk on the grass or gravel when sidewalks are slippery. Use de-icer on steps and walkways. Add non-slip devices to shoes.    Put grab bars and nonskid mats in your shower or tub and near the toilet. Try to use a shower chair or bath bench when bathing.   Get into a tub or shower by putting in your weaker leg first. Get out with your strong side first. Have a phone or medical alert device in the bathroom with you.   Where can you learn more?  Go to https://www.[employer].net/patiented  Enter G117 in the search box to learn more about \"Preventing Falls: Care Instructions.\"  Current as of: July 17, 2023               Content Version: 14.0    8684-6780 [employer].   Care instructions adapted under license by your healthcare professional. If you have questions about a medical condition or this instruction, always ask your healthcare professional. [employer] disclaims any warranty or liability for your use of this information.      Hearing Loss: Care Instructions  Overview     Hearing loss is a sudden or slow decrease in how well you hear. It can range from slight to profound. Permanent hearing loss can occur with aging. It also can happen when you are exposed long-term " to loud noise. Examples include listening to loud music, riding motorcycles, or being around other loud machines.  Hearing loss can affect your work and home life. It can make you feel lonely or depressed. You may feel that you have lost your independence. But hearing aids and other devices can help you hear better and feel connected to others.  Follow-up care is a key part of your treatment and safety. Be sure to make and go to all appointments, and call your doctor if you are having problems. It's also a good idea to know your test results and keep a list of the medicines you take.  How can you care for yourself at home?  Avoid loud noises whenever possible. This helps keep your hearing from getting worse.  Always wear hearing protection around loud noises.  Wear a hearing aid as directed.  A professional can help you pick a hearing aid that will work best for you.  You can also get hearing aids over the counter for mild to moderate hearing loss.  Have hearing tests as your doctor suggests. They can show whether your hearing has changed. Your hearing aid may need to be adjusted.  Use other devices as needed. These may include:  Telephone amplifiers and hearing aids that can connect to a television, stereo, radio, or microphone.  Devices that use lights or vibrations. These alert you to the doorbell, a ringing telephone, or a baby monitor.  Television closed-captioning. This shows the words at the bottom of the screen. Most new TVs can do this.  TTY (text telephone). This lets you type messages back and forth on the telephone instead of talking or listening. These devices are also called TDD. When messages are typed on the keyboard, they are sent over the phone line to a receiving TTY. The message is shown on a monitor.  Use text messaging, social media, and email if it is hard for you to communicate by telephone.  Try to learn a listening technique called speechreading. It is not lipreading. You pay attention to  "people's gestures, expressions, posture, and tone of voice. These clues can help you understand what a person is saying. Face the person you are talking to, and have them face you. Make sure the lighting is good. You need to see the other person's face clearly.  Think about counseling if you need help to adjust to your hearing loss.  When should you call for help?  Watch closely for changes in your health, and be sure to contact your doctor if:    You think your hearing is getting worse.     You have new symptoms, such as dizziness or nausea.   Where can you learn more?  Go to https://www.[employer].net/patiented  Enter R798 in the search box to learn more about \"Hearing Loss: Care Instructions.\"  Current as of: September 27, 2023               Content Version: 14.0    3066-3330 [employer].   Care instructions adapted under license by your healthcare professional. If you have questions about a medical condition or this instruction, always ask your healthcare professional. [employer] disclaims any warranty or liability for your use of this information.      Learning About Stress  What is stress?     Stress is your body's response to a hard situation. Your body can have a physical, emotional, or mental response. Stress is a fact of life for most people, and it affects everyone differently. What causes stress for you may not be stressful for someone else.  A lot of things can cause stress. You may feel stress when you go on a job interview, take a test, or run a race. This kind of short-term stress is normal and even useful. It can help you if you need to work hard or react quickly. For example, stress can help you finish an important job on time.  Long-term stress is caused by ongoing stressful situations or events. Examples of long-term stress include long-term health problems, ongoing problems at work, or conflicts in your family. Long-term stress can harm your health.  How does stress " affect your health?  When you are stressed, your body responds as though you are in danger. It makes hormones that speed up your heart, make you breathe faster, and give you a burst of energy. This is called the fight-or-flight stress response. If the stress is over quickly, your body goes back to normal and no harm is done.  But if stress happens too often or lasts too long, it can have bad effects. Long-term stress can make you more likely to get sick, and it can make symptoms of some diseases worse. If you tense up when you are stressed, you may develop neck, shoulder, or low back pain. Stress is linked to high blood pressure and heart disease.  Stress also harms your emotional health. It can make you trejo, tense, or depressed. Your relationships may suffer, and you may not do well at work or school.  What can you do to manage stress?  You can try these things to help manage stress:   Do something active. Exercise or activity can help reduce stress. Walking is a great way to get started. Even everyday activities such as housecleaning or yard work can help.  Try yoga or thai chi. These techniques combine exercise and meditation. You may need some training at first to learn them.  Do something you enjoy. For example, listen to music or go to a movie. Practice your hobby or do volunteer work.  Meditate. This can help you relax, because you are not worrying about what happened before or what may happen in the future.  Do guided imagery. Imagine yourself in any setting that helps you feel calm. You can use online videos, books, or a teacher to guide you.  Do breathing exercises. For example:  From a standing position, bend forward from the waist with your knees slightly bent. Let your arms dangle close to the floor.  Breathe in slowly and deeply as you return to a standing position. Roll up slowly and lift your head last.  Hold your breath for just a few seconds in the standing position.  Breathe out slowly and bend  "forward from the waist.  Let your feelings out. Talk, laugh, cry, and express anger when you need to. Talking with supportive friends or family, a counselor, or a lisa leader about your feelings is a healthy way to relieve stress. Avoid discussing your feelings with people who make you feel worse.  Write. It may help to write about things that are bothering you. This helps you find out how much stress you feel and what is causing it. When you know this, you can find better ways to cope.  What can you do to prevent stress?  You might try some of these things to help prevent stress:  Manage your time. This helps you find time to do the things you want and need to do.  Get enough sleep. Your body recovers from the stresses of the day while you are sleeping.  Get support. Your family, friends, and community can make a difference in how you experience stress.  Limit your news feed. Avoid or limit time on social media or news that may make you feel stressed.  Do something active. Exercise or activity can help reduce stress. Walking is a great way to get started.  Where can you learn more?  Go to https://www.[employer].net/patiented  Enter N032 in the search box to learn more about \"Learning About Stress.\"  Current as of: October 24, 2023               Content Version: 14.0    6918-9728 [employer].   Care instructions adapted under license by your healthcare professional. If you have questions about a medical condition or this instruction, always ask your healthcare professional. [employer] disclaims any warranty or liability for your use of this information.      Learning About Sleeping Well  What does sleeping well mean?     Sleeping well means getting enough sleep to feel good and stay healthy. How much sleep is enough varies among people.  The number of hours you sleep and how you feel when you wake up are both important. If you do not feel refreshed, you probably need more sleep. Another " "sign of not getting enough sleep is feeling tired during the day.  Experts recommend that adults get at least 7 or more hours of sleep per day. Children and older adults need more sleep.  Why is getting enough sleep important?  Getting enough quality sleep is a basic part of good health. When your sleep suffers, your physical health, mood, and your thoughts can suffer too. You may find yourself feeling more grumpy or stressed. Not getting enough sleep also can lead to serious problems, including injury, accidents, anxiety, and depression.  What might cause poor sleeping?  Many things can cause sleep problems, including:  Changes to your sleep schedule.  Stress. Stress can be caused by fear about a single event, such as giving a speech. Or you may have ongoing stress, such as worry about work or school.  Depression, anxiety, and other mental or emotional conditions.  Changes in your sleep habits or surroundings. This includes changes that happen where you sleep, such as noise, light, or sleeping in a different bed. It also includes changes in your sleep pattern, such as having jet lag or working a late shift.  Health problems, such as pain, breathing problems, and restless legs syndrome.  Lack of regular exercise.  Using alcohol, nicotine, or caffeine before bed.  How can you help yourself?  Here are some tips that may help you sleep more soundly and wake up feeling more refreshed.  Your sleeping area   Use your bedroom only for sleeping and sex. A bit of light reading may help you fall asleep. But if it doesn't, do your reading elsewhere in the house. Try not to use your TV, computer, smartphone, or tablet while you are in bed.  Be sure your bed is big enough to stretch out comfortably, especially if you have a sleep partner.  Keep your bedroom quiet, dark, and cool. Use curtains, blinds, or a sleep mask to block out light. To block out noise, use earplugs, soothing music, or a \"white noise\" machine.  Your evening " "and bedtime routine   Create a relaxing bedtime routine. You might want to take a warm shower or bath, or listen to soothing music.  Go to bed at the same time every night. And get up at the same time every morning, even if you feel tired.  What to avoid   Limit caffeine (coffee, tea, caffeinated sodas) during the day, and don't have any for at least 6 hours before bedtime.  Avoid drinking alcohol before bedtime. Alcohol can cause you to wake up more often during the night.  Try not to smoke or use tobacco, especially in the evening. Nicotine can keep you awake.  Limit naps during the day, especially close to bedtime.  Avoid lying in bed awake for too long. If you can't fall asleep or if you wake up in the middle of the night and can't get back to sleep within about 20 minutes, get out of bed and go to another room until you feel sleepy.  Avoid taking medicine right before bed that may keep you awake or make you feel hyper or energized. Your doctor can tell you if your medicine may do this and if you can take it earlier in the day.  If you can't sleep   Imagine yourself in a peaceful, pleasant scene. Focus on the details and feelings of being in a place that is relaxing.  Get up and do a quiet or boring activity until you feel sleepy.  Avoid drinking any liquids before going to bed to help prevent waking up often to use the bathroom.  Where can you learn more?  Go to https://www.[employer].net/patiented  Enter J942 in the search box to learn more about \"Learning About Sleeping Well.\"  Current as of: July 10, 2023               Content Version: 14.0    [id number] [employer].   Care instructions adapted under license by your healthcare professional. If you have questions about a medical condition or this instruction, always ask your healthcare professional. [employer] disclaims any warranty or liability for your use of this information.      "

## 2024-03-21 NOTE — COMMUNITY RESOURCES LIST (ENGLISH)
March 21, 2024           YOUR PERSONALIZED LIST OF SERVICES & PROGRAMS           & RECREATION    Sports      Fitness - Coaching & Training Services  [address] [place] [address] (Distance: 2.8 miles)  Phone: [phone]  Website: [email or web address]  Language: English      [place] - Sports clubs and recreational activities - [facility]  [address] [place] [address] (Distance: 1.2 miles)  Language: English  Fee: Self pay, Sliding scale      [facility] - Adult Enrichment  Phone: [phone]  Website: [email or web address]  Language: English  Hours: Mon 7:30 AM - 4:00 PM Tue 7:30 AM - 4:00 PM Wed 7:30 AM - 4:00 PM Thu 7:30 AM - 4:00 PM Fri 7:30 AM - 4:00 PM    Classes/Groups      of [facility] - Group fitness classes - [facility]  [address] [place] [place] [address] (Distance: 1.2 miles)  Language: English  Fee: Free, Self pay, Sliding scale      [place] - Yoga or Pilates classes - [facility]  [address] [place] [place] [address] (Distance: 1.2 miles)  Language: English  Fee: Free, Self pay, Sliding scale      [facility] - Adult Enrichment  Phone: [phone]  Website: [email or web address]  Language: English  Hours: Mon 7:30 AM - 4:00 PM Tue 7:30 AM - 4:00 PM Wed 7:30 AM - 4:00 PM Thu 7:30 AM - 4:00 PM Fri 7:30 AM - 4:00 PM               IMPORTANT NUMBERS & WEBSITES        Emergency Services  911  .   [employer] Way  211 http://211unitedway.org  .   Poison Control  (496) 909-7719 http://mnpoison.org http://wis[place]poison.org  .     Suicide and Crisis Lifeline  988 http://988lifeline.org  .   Childhelp [place] Child Abuse Hotline  952.604.4388 http://Childhelphotline.org   .   National Sexual Assault Hotline  (283) 684-4259 (HOPE) http://[facility].org   .     [facility]  Safeline  (847) 812-4927 (RU[place]AY) http://SkillPagesruCOPsync.org  .   Pregnancy & Postpartum Support  Call/text [phone]  [place]: http://ppsupportmn.org  [place]: http://psichapters.com/wi  .   Substance Abuse National Helpline ([facility])  615-940-HELP (8753) http://Findtreatment.gov   .                DISCLAIMER: [employer] does not endorse any service providers mentioned in this resource list. [employer] does not guarantee that the services mentioned in this resource list will be available to you or will improve your health or wellness.    [facility]

## 2024-03-21 NOTE — PROGRESS NOTES
Preventive Care Visit  [facility]  Golden Christensen MD, Internal Medicine - Pediatrics  Mar 21, 2024      Assessment & Plan     Screen for colon cancer  - KING([employer]); Future    Essential hypertension  Not adequately controlled today.  Will increase the amlodipine to 10 mg/day.  Discussed monitoring for swelling.  Discussed rechecking at home and letting us know  Levels are and coming back for nurse only in 1-2 weeks  - amLODIPine (NORVASC) 10 MG tablet; Take 1 tablet (10 mg) by mouth every morning Appointment needed for further refills    Encounter for Medicare annual wellness exam  Routine health guidance    Hypercholesterolemia  Will screen labs today.  Continue atorvastatin 10 mill per day.  - Lipid panel reflex to direct LDL Non-fasting; Future  - Comprehensive metabolic panel; Future  - Lipid panel reflex to direct LDL Non-fasting; Future  - Lipid panel reflex to direct LDL Non-fasting  - Comprehensive metabolic panel    DDD (degenerative disc disease), lumbar  Did certify patient for medical cannabis will increase gabapentin slightly to see if this helps with pain well.  - gabapentin (NEURONTIN) 300 MG capsule; Take 3 capsules (900 mg) by mouth 3 times daily TAKE 3 CAPSULES BY MOUTH THREE TIMES DAILY  - MR Lumbar Spine w/o Contrast; Future    Hyperlipidemia LDL goal <100  - atorvastatin (LIPITOR) 10 MG tablet; Take 1 tablet (10 mg) by mouth daily    Abnormal finding of blood chemistry  - Hemoglobin A1c; Future  - Hemoglobin A1c    Muscle ache  Will check for contributing causes with labs.  Started medical cannabis as noted.  Certified for this  - Vitamin D deficiency screening; Future  - Vitamin B12; Future  - TSH with free T4 reflex; Future  - Comprehensive metabolic panel; Future  - Vitamin D deficiency screening  - Vitamin B12  - TSH with free T4 reflex  - Comprehensive metabolic panel    Vitamin D deficiency  - Vitamin D deficiency screening; Future  - Vitamin D  "deficiency screening    Lumbar back pain  Patient having progressive back pain symptoms will get a MRI to evaluate further.  - MR Lumbar Spine w/o Contrast; Future    Impacted cerumen of right ear  This was removed by MA by washing without acute issues.  - REMOVE IMPACTED CERUMEN    They should email about the medical cannabis- let me know if issues.  - [employer] is the name of the place in [place] that you should be able to go to. Check your spam filter if you don't see the email from the state.     Increase the amlodipine to 10 mg per day- watch for swelling and if develops let me know.     Increase the gabapentin to 900 mg three times per day- can increase first at night to 3 pills of the 300 mg then other doses every couple of days.     We will have you come back in for nurse only to check BLOOD PRESSURE in 1-2 weeks.     They should call to set up the MRI- we may refer to a specialist to look at the back more.     Golden Christensen MD    BMI  Estimated body mass index is 32.28 kg/m  as calculated from the following:    Height as of this encounter: 1.638 m (5' 4.5\").    Weight as of this encounter: 86.6 kg (191 lb).   Weight management plan: Discussed healthy diet and exercise guidelines    Counseling  Appropriate preventive services were discussed with this patient, including applicable screening as appropriate for fall prevention, nutrition, physical activity, Tobacco-use cessation, weight loss and cognition.  Checklist reviewing preventive services available has been given to the patient.  Reviewed patient's diet, addressing concerns and/or questions.   Discussed possible causes of fatigue. Patient reported safety concerns were addressed today.The patient was provided with written information regarding signs of hearing loss.           Terri Cervantes is a 73 year old, presenting for the following:  Annual Visit (Nonfasting ; aching legs )        3/21/2024    11:17 AM   Additional Questions   Roomed by Fadi X "         Health Care Directive  Patient does not have a Health Care Directive or Living Will: Discussed advance care planning with patient; information given to patient to review.    HPI  Legs have been aching more- worse at night when trying to sleep.   - has not tried anything- no focal joint pain, mostly in the muscles. , keeps awake.   - when walking during the day- gets ongoing achiness in the back- has arthritis in the back. Has knee arthritis and gets injections for this.   - has been doing gabapentin- does help the back- 600 mg of gabapentin three times per day has been doing aleve as well.           3/21/2024   General Health   How would you rate your overall physical health? (!) POOR   Feel stress (tense, anxious, or unable to sleep) To some extent   (!) STRESS CONCERN      3/21/2024   Nutrition   Diet: I don't know         3/21/2024   Exercise   Days per week of moderate/strenous exercise 0 days   (!) EXERCISE CONCERN      3/21/2024   Social Factors   Frequency of gathering with friends or relatives Three times a week   Worry food won't last until get money to buy more No   Food not last or not have enough money for food? No   Do you have housing?  Yes   Are you worried about losing your housing? No   Lack of transportation? No   Unable to get utilities (heat,electricity)? No         3/21/2024   Activities of Daily Living- Home Safety   Needs help with the following daily activites None of the above   Safety concerns in the home No grab bars in the bathroom         3/21/2024   Dental   Dentist two times every year? Yes         3/21/2024   Hearing Screening   Hearing concerns? (!) I NEED TO ASK PEOPLE TO SPEAK UP OR REPEAT THEMSELVES.    (!) IT'S HARD TO FOLLOW A CONVERSATION IN A NOISY RESTAURANT OR CROWDED ROOM.    (!) TROUBLE UNDERSTANDING SOFT OR WHISPERED SPEECH.         3/21/2024   Driving Risk Screening   Patient/family members have concerns about driving No         3/21/2024   General  Alertness/Fatigue Screening   Have you been more tired than usual lately? (!) YES         3/21/2024   Urinary Incontinence Screening   Bothered by leaking urine in past 6 months No         3/21/2024   TB Screening   Were you born outside of the [place]? No         Today's PHQ-2 Score:       3/21/2024    11:13 AM   PHQ-2 (  [employer])   Q1: Little interest or pleasure in doing things 1   Q2: Feeling down, depressed or hopeless 0   PHQ-2 Score 1   Q1: Little interest or pleasure in doing things Several days   Q2: Feeling down, depressed or hopeless Not at all   PHQ-2 Score 1           3/21/2024   Substance Use   Alcohol more than 3/day or more than 7/wk No   Do you have a current opioid prescription? No   How severe/bad is pain from 1 to 10? 7/10   Do you use any other substances recreationally? No     Social History     Tobacco Use    Smoking status: Former     Types: Cigarettes     Quit date: 1991     Years since quittin.1    Smokeless tobacco: Never   Substance Use Topics    Alcohol use: Yes     Comment: occ    Drug use: Never           2024   LAST FHS-7 RESULTS   1st degree relative breast or ovarian cancer Yes   Any relative bilateral breast cancer No   Any male have breast cancer No   Any ONE woman have BOTH breast AND ovarian cancer No   Any woman with breast cancer before 50yrs No   2 or more relatives with breast AND/OR ovarian cancer No   2 or more relatives with breast AND/OR bowel cancer No        Mammogram Screening - Annual screen due to greater than 20% lifetime risk as estimated by Breast Cancer Risk Calculator    ASCVD Risk   The 10-year ASCVD risk score (Sari ZAMORANO, et al., 2019) is: 19.7%    Values used to calculate the score:      Age: 73 years      Sex: Female      Is Non- : No      Diabetic: No      Tobacco smoker: No      Systolic Blood Pressure: 160 mmHg      Is BP treated: Yes      HDL Cholesterol: 62 mg/dL      Total Cholesterol: 161  "mg/dL            Reviewed and updated as needed this visit by Provider                      Current providers sharing in care for this patient include:  Patient Care Team:  Golden Christensen MD as PCP - General (Internal Medicine - Pediatrics)  Leroy Peacock DO as Assigned Musculoskeletal Provider  Golden Christensen MD as Assigned PCP    The following health maintenance items are reviewed in Epic and correct as of today:  Health Maintenance   Topic Date Due    RSV VACCINE (Pregnancy & 60+) (1 - 1-dose 60+ series) Never done    COLORECTAL CANCER SCREENING  12/08/2021    LUNG CANCER SCREENING  01/20/2023    INFLUENZA VACCINE (1) 09/01/2023    COVID-19 Vaccine (5 - 2023-24 season) 09/01/2023    MEDICARE ANNUAL WELLNESS VISIT  12/29/2023    ANNUAL REVIEW OF HM ORDERS  12/29/2023    BMP  12/30/2023    LIPID  12/30/2023    FALL RISK ASSESSMENT  03/21/2025    GLUCOSE  12/30/2025    MAMMO SCREENING  01/11/2026    ADVANCE CARE PLANNING  12/30/2027    DTAP/TDAP/TD IMMUNIZATION (3 - Td or Tdap) 05/03/2033    DEXA  01/13/2038    HEPATITIS C SCREENING  Completed    PHQ-2 (once per calendar year)  Completed    Pneumococcal Vaccine: 65+ Years  Completed    ZOSTER IMMUNIZATION  Completed    IPV IMMUNIZATION  Aged Out    HPV IMMUNIZATION  Aged Out    MENINGITIS IMMUNIZATION  Aged Out    RSV MONOCLONAL ANTIBODY  Aged Out         Review of Systems  Constitutional, HEENT, cardiovascular, pulmonary, gi and gu systems are negative, except as otherwise noted.     Objective    Exam  BP (!) 160/96 (BP Location: Right arm, Patient Position: Sitting, Cuff Size: Adult Regular)   Pulse 80   Temp 97.1  F (36.2  C) (Temporal)   Resp 16   Ht 1.638 m (5' 4.5\")   Wt 86.6 kg (191 lb)   SpO2 96%   BMI 32.28 kg/m     Estimated body mass index is 32.28 kg/m  as calculated from the following:    Height as of this encounter: 1.638 m (5' 4.5\").    Weight as of this encounter: 86.6 kg (191 lb).    Physical Exam  GENERAL: alert and no " distress  EYES: Eyes grossly normal to inspection, PERRL and conjunctivae and sclerae normal  HENT: ear canals and TM's normal, nose and mouth without ulcers or lesions  NECK: no adenopathy, no asymmetry, masses, or scars  RESP: lungs clear to auscultation - no rales, rhonchi or wheezes  CV: regular rate and rhythm, normal S1 S2, no S3 or S4, no murmur, click or rub, no peripheral edema  ABDOMEN: soft, nontender, no hepatosplenomegaly, no masses and bowel sounds normal  MS: no gross musculoskeletal defects noted, no edema  SKIN: no suspicious lesions or rashes  NEURO: Normal strength and tone, mentation intact and speech normal  PSYCH: mentation appears normal, affect normal/bright         3/21/2024   Mini Cog   Clock Draw Score 2 Normal   3 Item Recall 3 objects recalled   Mini Cog Total Score 5              Signed Electronically by: Golden Christensen MD

## 2024-03-21 NOTE — LETTER
March 22, 2024      Helen R Ronnie  [address] [facility] [address]        Dear ,    We are writing to inform you of your test results.    Here are the results from the recent Labs that we did.     Your cholesterol overlap all looks good.  Your triglycerides which is slightly elevated.  We can continue to follow these over time.         Your test for diabetes (A1C) returned in the prediabetic range (5.7 to 6.4%). Regular aerobic exercise such as walking, biking, elliptical can all help with this. Cutting back on processed carbohydrates such as rice, breads, sugars, and pastas can also help. We should recheck this level in 3-6 months.     The other labs looked good.       Let me know if you have questions or concerns!     Sincerely,       Golden Christensen MD   Internal Medicine and Pediatrics     Resulted Orders   Lipid panel reflex to direct LDL Non-fasting   Result Value Ref Range    Cholesterol 168 <200 mg/dL    Triglycerides 181 (H) <150 mg/dL    Direct Measure HDL 66 >=50 mg/dL    LDL Cholesterol Calculated 66 <=100 mg/dL    Non HDL Cholesterol 102 <130 mg/dL    Patient Fasting > 8hrs? Unknown     Narrative    Cholesterol  Desirable:  <200 mg/dL    Triglycerides  Normal:  Less than 150 mg/dL  Borderline High:  150-199 mg/dL  High:  200-499 mg/dL  Very High:  Greater than or equal to 500 mg/dL    Direct Measure HDL  Female:  Greater than or equal to 50 mg/dL   Male:  Greater than or equal to 40 mg/dL    LDL Cholesterol  Desirable:  <100mg/dL  Above Desirable:  100-129 mg/dL   Borderline High:  130-159 mg/dL   High:  160-189 mg/dL   Very High:  >= 190 mg/dL    Non HDL Cholesterol  Desirable:  130 mg/dL  Above Desirable:  130-159 mg/dL  Borderline High:  160-189 mg/dL  High:  190-219 mg/dL  Very High:  Greater than or equal to 220 mg/dL   Vitamin D deficiency screening   Result Value Ref Range    Vitamin D, Total (25-Hydroxy) 37 20 - 50 ng/mL      Comment:      optimum levels    Narrative    Season, race,  dietary intake, and treatment affect the concentration of 25-hydroxy-Vitamin D. Values may decrease during winter months and increase during summer months.    Vitamin D determination is routinely performed by an immunoassay specific for 25 hydroxyvitamin D3.  If an individual is on vitamin D2(ergocalciferol) supplementation, please specify 25 OH vitamin D2 and D3 level determination by LCMSMS test VITD23.     Vitamin B12   Result Value Ref Range    Vitamin B12 845 232 - 1,245 pg/mL   TSH with free T4 reflex   Result Value Ref Range    TSH 1.27 0.30 - 4.20 uIU/mL   Comprehensive metabolic panel   Result Value Ref Range    Sodium 140 135 - 145 mmol/L      Comment:      Reference intervals for this test were updated on 09/26/2023 to more accurately reflect our healthy population. There may be differences in the flagging of prior results with similar values performed with this method. Interpretation of those prior results can be made in the context of the updated reference intervals.     Potassium 4.0 3.4 - 5.3 mmol/L    Carbon Dioxide (CO2) 27 22 - 29 mmol/L    Anion Gap 12 7 - 15 mmol/L    Urea Nitrogen 16.7 8.0 - 23.0 mg/dL    Creatinine 0.73 0.51 - 0.95 mg/dL    GFR Estimate 87 >60 mL/min/1.73m2    Calcium 10.0 8.8 - 10.2 mg/dL    Chloride 101 98 - 107 mmol/L    Glucose 97 70 - 99 mg/dL    Alkaline Phosphatase 125 40 - 150 U/L      Comment:      Reference intervals for this test were updated on 11/14/2023 to more accurately reflect our healthy population. There may be differences in the flagging of prior results with similar values performed with this method. Interpretation of those prior results can be made in the context of the updated reference intervals.    AST 28 0 - 45 U/L      Comment:      Reference intervals for this test were updated on 6/12/2023 to more accurately reflect our healthy population. There may be differences in the flagging of prior results with similar values performed with this method.  Interpretation of those prior results can be made in the context of the updated reference intervals.    ALT 28 0 - 50 U/L      Comment:      Reference intervals for this test were updated on 6/12/2023 to more accurately reflect our healthy population. There may be differences in the flagging of prior results with similar values performed with this method. Interpretation of those prior results can be made in the context of the updated reference intervals.      Protein Total 7.4 6.4 - 8.3 g/dL    Albumin 4.5 3.5 - 5.2 g/dL    Bilirubin Total 0.2 <=1.2 mg/dL   Hemoglobin A1c   Result Value Ref Range    Hemoglobin A1C 5.7 (H) 0.0 - 5.6 %      Comment:      Normal <5.7%   Prediabetes 5.7-6.4%    Diabetes 6.5% or higher     Note: Adopted from ADA consensus guidelines.       If you have any questions or concerns, please call the clinic at the number listed above.       Sincerely,      Golden Christensen MD

## 2024-03-22 LAB
ALBUMIN SERPL BCG-MCNC: 4.5 G/DL (ref 3.5–5.2)
ALP SERPL-CCNC: 125 U/L (ref 40–150)
ALT SERPL W P-5'-P-CCNC: 28 U/L (ref 0–50)
ANION GAP SERPL CALCULATED.3IONS-SCNC: 12 MMOL/L (ref 7–15)
AST SERPL W P-5'-P-CCNC: 28 U/L (ref 0–45)
BILIRUB SERPL-MCNC: 0.2 MG/DL
BUN SERPL-MCNC: 16.7 MG/DL (ref 8–23)
CALCIUM SERPL-MCNC: 10 MG/DL (ref 8.8–10.2)
CHLORIDE SERPL-SCNC: 101 MMOL/L (ref 98–107)
CHOLEST SERPL-MCNC: 168 MG/DL
CREAT SERPL-MCNC: 0.73 MG/DL (ref 0.51–0.95)
DEPRECATED HCO3 PLAS-SCNC: 27 MMOL/L (ref 22–29)
EGFRCR SERPLBLD CKD-EPI 2021: 87 ML/MIN/1.73M2
FASTING STATUS PATIENT QL REPORTED: ABNORMAL
GLUCOSE SERPL-MCNC: 97 MG/DL (ref 70–99)
HDLC SERPL-MCNC: 66 MG/DL
LDLC SERPL CALC-MCNC: 66 MG/DL
NONHDLC SERPL-MCNC: 102 MG/DL
POTASSIUM SERPL-SCNC: 4 MMOL/L (ref 3.4–5.3)
PROT SERPL-MCNC: 7.4 G/DL (ref 6.4–8.3)
SODIUM SERPL-SCNC: 140 MMOL/L (ref 135–145)
TRIGL SERPL-MCNC: 181 MG/DL
TSH SERPL DL<=0.005 MIU/L-ACNC: 1.27 UIU/ML (ref 0.3–4.2)
VIT B12 SERPL-MCNC: 845 PG/ML (ref 232–1245)
VIT D+METAB SERPL-MCNC: 37 NG/ML (ref 20–50)

## 2024-03-27 ENCOUNTER — TELEPHONE (OUTPATIENT)
Dept: FAMILY MEDICINE | Facility: CLINIC | Age: 74
End: 2024-03-27

## 2024-03-27 ENCOUNTER — ALLIED HEALTH/NURSE VISIT (OUTPATIENT)
Dept: FAMILY MEDICINE | Facility: CLINIC | Age: 74
End: 2024-03-27
Payer: COMMERCIAL

## 2024-03-27 VITALS — OXYGEN SATURATION: 93 % | DIASTOLIC BLOOD PRESSURE: 84 MMHG | SYSTOLIC BLOOD PRESSURE: 129 MMHG | HEART RATE: 65 BPM

## 2024-03-27 DIAGNOSIS — I10 ESSENTIAL HYPERTENSION: Primary | ICD-10-CM

## 2024-03-27 PROCEDURE — 99207 PR NO CHARGE NURSE ONLY: CPT

## 2024-03-27 NOTE — PROGRESS NOTES
Andree Trujillo is a 73 year old year old patient who comes in today for a Blood Pressure check because of medication change and ongoing blood pressure monitoring.  Vital Signs as repeated by RN JOELLE  Patient is taking medication as prescribed  Patient is tolerating medications well apart from dry mouth mostly when in bed.  Patient is not monitoring Blood Pressure at home.  Current complaints: none  Disposition:  patient to continue with the same medication and patient reminded to call as needed    Patient also requested to have her lab results including doctor's comments be sent to her via mail as she does not have [employer]. RN will forward to care team.    /84 (BP Location: Right arm, Patient Position: Sitting, Cuff Size: Adult Regular)   Pulse 65   SpO2 93%

## 2024-03-27 NOTE — TELEPHONE ENCOUNTER
Patient had an RN visit for BP check this AM. She requested to have her lab results including doctor's comments be sent to her via mail as she does not use [employer].

## 2024-04-03 ENCOUNTER — HOSPITAL ENCOUNTER (OUTPATIENT)
Dept: MRI IMAGING | Facility: CLINIC | Age: 74
Discharge: HOME | End: 2024-04-03
Attending: INTERNAL MEDICINE | Admitting: INTERNAL MEDICINE
Payer: COMMERCIAL

## 2024-04-03 DIAGNOSIS — M51.369 DDD (DEGENERATIVE DISC DISEASE), LUMBAR: ICD-10-CM

## 2024-04-03 DIAGNOSIS — M54.50 LUMBAR BACK PAIN: ICD-10-CM

## 2024-04-03 PROCEDURE — 72148 MRI LUMBAR SPINE W/O DYE: CPT

## 2024-04-08 ENCOUNTER — TELEPHONE (OUTPATIENT)
Dept: FAMILY MEDICINE | Facility: CLINIC | Age: 74
End: 2024-04-08
Payer: COMMERCIAL

## 2024-04-08 NOTE — TELEPHONE ENCOUNTER
Patient returned call, RN gave message below from Dr. Christensen. Patient states understanding. No additional questions at this time.

## 2024-04-17 ENCOUNTER — OFFICE VISIT (OUTPATIENT)
Dept: PHYSICAL MEDICINE AND REHAB | Facility: CLINIC | Age: 74
End: 2024-04-17
Attending: INTERNAL MEDICINE
Payer: COMMERCIAL

## 2024-04-17 VITALS
HEART RATE: 66 BPM | BODY MASS INDEX: 30.97 KG/M2 | HEIGHT: 65 IN | SYSTOLIC BLOOD PRESSURE: 140 MMHG | WEIGHT: 185.9 LBS | DIASTOLIC BLOOD PRESSURE: 77 MMHG

## 2024-04-17 DIAGNOSIS — M47.816 LUMBAR FACET ARTHROPATHY: ICD-10-CM

## 2024-04-17 DIAGNOSIS — M48.061 LUMBAR FORAMINAL STENOSIS: ICD-10-CM

## 2024-04-17 DIAGNOSIS — M48.062 SPINAL STENOSIS OF LUMBAR REGION WITH NEUROGENIC CLAUDICATION: Primary | ICD-10-CM

## 2024-04-17 PROCEDURE — 99204 OFFICE O/P NEW MOD 45 MIN: CPT | Performed by: NURSE PRACTITIONER

## 2024-04-17 ASSESSMENT — PAIN SCALES - GENERAL: PAINLEVEL: NO PAIN (1)

## 2024-04-17 NOTE — PATIENT INSTRUCTIONS
~Spine Center Scheduling #[phone].  ~Please call our [facility] Spine Nurse Navigation #[phone] with any questions or concerns about your treatment plan, if symptoms worsen and you would like to be seen urgently, or if you have problems controlling bladder and bowel function.  ~For any future flareups or new symptoms, recommend follow-up in clinic or contact the nurse navigator line.  ~Please note that any My Chart messages may take multiple days for a response due to the high volume of patients seen in clinic.  Anything sent Thursday night or after will be answered the following week when able, as Liset Goyal CNP does not work in clinic on Fridays.   ~Liset Goyal CNP is at the [facility] on the first and third Tuesdays of the month only, otherwise primarily at the [facility].        ~You have been referred for Physical Therapy to [facility]. They will call you to schedule an appointment.      Scheduling phone number is [phone] [facility], or [place] location.  If you have not heard from the scheduling office within 2 business days, please call [phone] for ALL other locations.    Discussed the importance of core strengthening, ROM, stretching exercises and how each of these entities is important in decreasing pain and improving long term spine health.  The purpose of physical therapy is to teach you an individualized home exercise program.  These exercises need to be performed every day in order to decrease pain and prevent future occurrences of pain.           An injection has been ordered today to potentially help with your pain symptoms. These injections do not fix what is going on in your back, therefore they typically do not take away the pain completely, however they can many times help improve symptoms. Injections should always be completed along with other modalities such as physical therapy  for the best long term outcomes. If injections alone are done, then pain will likely return.     [facility] Injection Requirements    A  is required for all fluoroscopically-guided injections.  Injection appointments may be cancelled if there are signs/symptoms of an active infection or if the patient is being actively treated with antibiotics for a diagnosed infection.  Patients may have their steroid injection cancelled if they have had another steroid injection within 2 weeks.  Diabetic patients will have their blood glucose levels checked the day of their injection and the appointment will be rescheduled if the blood glucose level is 300 or higher.  Patients with allergies to cortisone, local anesthetics, iodine, or contrast dye should contact the Spine Center to further discuss these considerations.  Patients scheduled for medial branch block diagnostic injections should refrain from taking pain medication the day of the procedure.  The medial branch block injection appointment will be rescheduled if the patient's pain rating is not 5/10 or greater at the time of the procedure.  Patients taking warfarin/Coumadin will have their INR checked the day of the procedure and the procedure may be rescheduled if the INR is greater than 3.0.  Please contact the Spine Center (#[phone]) if you are taking any prescription blood-thinning medications (warfarin, Plavix, Lovenox, Eliquis, Brilinta, Effient, etc.) as special dosing adjustments may need to be made depending on the type of injection you are scheduled to receive.  It is recommended that you delay having your steroid injection if you have received a flu shot or shingles vaccine within 2 weeks.

## 2024-04-17 NOTE — PROGRESS NOTES
ASSESSMENT: Andree Trujillo is a 73 year old female who presents for consultation at the request of PCP Golden Christensen, with a past medical history significant for vitamin D deficiency, hypercholesterolemia, ovarian cyst, hypertension, osteopenia, restless leg syndrome, spinal stenosis who presents today for new patient evaluation of:    -Chronic bilateral low back pain that is typically worse with standing and walking however some pain constantly as well.  Lumbar MRI with moderate to severe spinal stenosis at L3-4.  Facet arthropathy greatest at L4-5 and L3-4 due to spondylolisthesis.    Patient is neurologically intact on exam. No myelopathic or red flag symptoms.          No data to display                     Diagnoses and all orders for this visit:  Spinal stenosis of lumbar region with neurogenic claudication  -     Spine  Referral  -     Physical Therapy  Referral; Future  -     PAIN Transforaminal SAI Inj Lumbosacral [place]; Future  Lumbar foraminal stenosis  -     Physical Therapy  Referral; Future  -     PAIN Transforaminal SAI Inj Lumbosacral [place]; Future  Lumbar facet arthropathy  -     Physical Therapy  Referral; Future    PLAN:  Reviewed spine anatomy and disease process. Discussed diagnosis and treatment options with the patient today. A shared decision making model was used.  The patient's values and choices were respected. The following represents what was discussed and decided upon by the provider and the patient.      -DIAGNOSTIC TESTS:  Images were personally reviewed and interpreted and explained to patient today using spine model.   -Lumbar spine MRI 4/3/2024 with multilevel degenerative changes with facet arthropathy.  L3-4 moderate to severe spinal stenosis with right greater than left foraminal stenosis.  Mild spinal stenosis with mild bilateral foraminal stenosis.  L4-5 mild bilateral foraminal stenosis.  L5-S1 mild subarticular stenosis.   L2-3    -PHYSICAL THERAPY: Referral placed to physical therapy [facility] for establishing home exercises for core strengthening.  Discussed the importance of core strengthening, ROM, stretching exercises with the patient and how each of these entities is important in decreasing pain.  Explained to the patient that the purpose of physical therapy is to teach the patient a home exercise program.  These exercises need to be performed every day in order to decrease pain and prevent future occurrences of pain.        -MEDICATIONS: No changes to medications today.  Discussed multiple medication options today with patient. Discussed risks, side effects, and proper use of medications. Patient verbalized understanding.    -INTERVENTIONS: Order placed for bilateral L3-4 transforaminal epidural steroid injection to see if we get further relief of stenosis related back pain/neurogenic claudication.  -If no benefit with this injection we could consider a bilateral L2, L3, L4 medial branch block targeting the facet joints at L3-4 and L4-5 for possible RFA.  Discussed risks and benefits of injections with patient today.    -PATIENT EDUCATION:  Total time of 46 minutes, on the day of service, spent with the patient, reviewing the chart, placing orders, and documenting.     -FOLLOW-UP:   Follow-up for injection at the spine center then 2 weeks postinjection    Advised patient to call the Spine Center if symptoms worsen or you have problems controlling bladder and bowel function.   ______________________________________________________________________    SUBJECTIVE:  HPI:  Andree Trujillo  Is a 73 year old female who presents today for new patient evaluation of low back pain bilateral lower lumbar spine that is ongoing for decades, has been worsening over the last couple of years however and specifically over the last couple of months.  Currently her pain is a constant 1/10 up to an 8 at its worst and most aggravated  with standing and walking but it does aggravate with bending and twisting with housework as well.  Overall feels much more tolerable with sitting.  Currently denies any radiating lower extremity pain but she does report occasional weakness in her legs with walking when her pain is severe.  Denies any episodes of her legs giving out on her.  Denies numbness or tingling sensations.  Denies bowel or bladder loss control, denies saddle anesthesia.    -Patient reports that she does have chronic knee pain and has had injections last in November 2023 with [facility].    -Treatment to Date: No prior spinal surgery  Physical therapy 2018 with [facility], nothing recent    History of lumbar injections in 2018 with [facility] with some relief.    -Medications:  Gabapentin 300 mg, 3 - 3 - 3    Current Outpatient Medications   Medication Sig Dispense Refill    ASPIRIN 81 MG OR TABS 1 TABLET DAILY      gabapentin (NEURONTIN) 300 MG capsule Take 3 capsules (900 mg) by mouth 3 times daily TAKE 3 CAPSULES BY MOUTH THREE TIMES DAILY 810 capsule 3    amLODIPine (NORVASC) 10 MG tablet Take 1 tablet (10 mg) by mouth every morning Appointment needed for further refills 90 tablet 3    atorvastatin (LIPITOR) 10 MG tablet Take 1 tablet (10 mg) by mouth daily 90 tablet 3    hydrochlorothiazide (MICROZIDE) 12.5 MG capsule Take 1 capsule by mouth once daily 90 capsule 1    metoprolol succinate ER (TOPROL XL) 100 MG 24 hr tablet Take 1 tablet (100 mg) by mouth daily 90 tablet 3    Multiple Vitamin (DAILY MULTIVITAMIN PO) Take 1 tablet by mouth daily       pramipexole (MIRAPEX) 0.125 MG tablet Take 1-2 tablets (0.125-0.25 mg) by mouth at bedtime Appointment needed for further refills 180 tablet 3     Current Facility-Administered Medications   Medication Dose Route Frequency Provider Last Rate Last Admin    3 mL ropivacaine (NAROPIN) injection 5 mg/mL  3 mL   Leroy Peacock, DO   3 mL at 11/10/23 1613    3 mL ropivacaine (NAROPIN)  injection 5 mg/mL  3 mL   Repa, Leroy Webb, DO   3 mL at 04/14/23 1611    hylan (SYNVISC ONE) injection 48 mg  48 mg   Repa, Leroy Huertaer, DO   48 mg at 11/10/23 1613    hylan (SYNVISC ONE) injection 48 mg  48 mg   Repa, Leroy Morrisophalvin, DO   48 mg at 04/14/23 1614    hylan (SYNVISC ONE) injection 48 mg  48 mg   Repa, Leroy Morrisopher, DO   48 mg at 03/11/22 1530    triamcinolone (KENALOG-40) injection 40 mg  40 mg   Repa, Leroy Morrisopher, DO   40 mg at 11/10/23 1613    triamcinolone (KENALOG-40) injection 40 mg  40 mg   Repa, Leroy Morrisopher, DO   40 mg at 04/14/23 1611       Allergies   Allergen Reactions    Erythromycin Rash    Clindamycin Itching    Penicillins Rash       Past Medical History:   Diagnosis Date    Allergies     Anxiety     Cyst of breast     Essential hypertension     Osteoarthritis of spine with radiculopathy, lumbar region     Osteopenia     Right hip pain     Right knee pain     Spinal stenosis of lumbar region with neurogenic claudication     Tendinitis of both feet         Patient Active Problem List   Diagnosis    Osteopenia of both hips    Hypercholesterolemia    Essential hypertension    Vitamin D deficiency    Cyst of ovary    Spinal stenosis of lumbar region with neurogenic claudication    DDD (degenerative disc disease), lumbar    Restless leg syndrome       Past Surgical History:   Procedure Laterality Date    HYSTERECTOMY TOTAL ABDOMINAL      11 years ago. Benign ovarian tumor. Everything removed.    HYSTERECTOMY, PAP NO LONGER INDICATED  2009    Total hysterectomy with bilateral oophorectomy due to Pelvic pain       Family History   Problem Relation Age of Onset    Breast Cancer Mother     Arthritis Mother     Hypertension Father     Alcohol/Drug Father     Arthritis Father     Cerebrovascular Disease Maternal Grandmother     Cancer Maternal Grandmother     Cancer Maternal Grandfather     Hypertension Paternal Grandfather     Hypertension Brother     Prostate  "Cancer Brother     Alcohol/Drug Brother     Allergies Brother     Diabetes Paternal Aunt     Uterine Cancer Sister     Coronary Artery Disease No family hx of        Reviewed past medical, surgical, and family history with patient found on new patient intake packet located in EMR Media tab.     SOCIAL HX: Patient is single and retired.  Patient denies smoking/tobacco use.  Reports seldom alcohol use, denies history being a heavy drinker, denies recreational drug use.    ROS: Positive for joint pain muscle pain, muscle fatigue, imbalance, insomnia, excessive tiredness, diarrhea, cough, changes in vision, hoarseness, weight gain.  Specifically negative for bowel/bladder dysfunction, balance changes, headache, dizziness, foot drop, fevers, chills, appetite changes, nausea/vomiting, unexplained weight loss. Otherwise 13 systems reviewed are negative. Please see the patient's intake questionnaire from today for details.    OBJECTIVE:  BP (!) 140/77 (BP Location: Right arm, Patient Position: Sitting, Cuff Size: Adult Regular)   Pulse 66   Ht 5' 4.5\" (1.638 m)   Wt 185 lb 14.4 oz (84.3 kg)   BMI 31.42 kg/m      PHYSICAL EXAMINATION:    --CONSTITUTIONAL:  Vital signs as above.  No acute distress.  The patient is well nourished and well groomed.  --PSYCHIATRIC:  Appropriate mood and affect. The patient is awake, alert, oriented to person, place, time and answering questions appropriately with clear speech.    --SKIN:  Skin over the face, bilateral lower extremities, and posterior torso is clean, dry, intact without rashes.    --RESPIRATORY: Normal rhythm and effort. No abnormal accessory muscle breathing patterns noted.   --STANDING EXAMINATION:  Normal lumbar lordosis noted, no lateral shift.  --MUSCULOSKELETAL: Range of motion is not limited in lumbar flexion, extension, lateral rotation. No tenderness to palpation lumbar spine. Straight leg raising is negative to radicular pain. Sciatic notch non-tender.  --GROSS " MOTOR: Gait is non-antalgic. Easily arises from a seated position.   --LOWER EXTREMITY MOTOR TESTING:  Plantar flexion left 5/5, right 5/5   Dorsiflexion left 5/5, right 5/5   Great toe MTP extension left 5/5, right 5/5  Knee flexion left 5/5, right 5/5  Knee extension left 5/5, right 5/5   Hip flexion left 5/5, right 5/5  --HIPS: Full range of motion bilaterally.   --NEUROLOGICAL:  1/4 patellar, medial hamstring, and achilles reflexes bilaterally.  Sensation to light touch is intact in the bilateral L4, L5, and S1 dermatomes. Babinski is negative. No clonus.  Negative Santa reflex bilaterally.  --VASCULAR:  Bilateral lower extremities are warm.  There is no pitting edema of the bilateral lower extremities.    RESULTS: Prior medical records from [facility] were reviewed today.    Imaging: Spine imaging was personally reviewed and interpreted today. The images were shown to the patient and the findings were explained using a spine model.      MR Lumbar Spine w/o Contrast    Result Date: 4/3/2024  EXAM: MR LUMBAR SPINE W/O CONTRAST LOCATION: [facility] DATE: 4/3/2024 INDICATION: ongoing chronic lower back pain with progressive symptoms COMPARISON: 10/04/2021 TECHNIQUE: Routine Lumbar Spine MRI without IV contrast. FINDINGS: Nomenclature is based on 5 lumbar type vertebral bodies. There is 2 mm anterolisthesis of C3 on C4 and 5 mm anterolisthesis of C4 on C5, similar to prior. Normal vertebral body heights and marrow signal. Normal distal spinal cord and cauda equina with conus medullaris at L1. Suspect numerous T2 hyperintense hepatic lesions, similar prior and likely representing hepatic cysts. Unremarkable visualized bony pelvis. T12-L1: Normal disc height and signal. No herniation. Normal facets. No spinal canal or neural foraminal stenosis. L1-L2: Normal disc height and signal. No herniation. Normal facets. No spinal canal or neural foraminal stenosis. L2-L3:  Disc desiccation and disc height loss. Diffuse disc bulge. Bilateral facet hypertrophy. Mild spinal canal stenosis. Mild bilateral neural foraminal stenosis. L3-L4: Disc desiccation and disc height loss. Diffuse disc bulge. Bilateral ligament flavum and facet hypertrophy. Moderate to severe spinal canal stenosis. Mild-to-moderate right greater than left neural foraminal stenosis. Mild bilateral subarticular stenosis. L4-L5: Disc desiccation and disc height loss. Diffuse disc bulge. Bilateral facet hypertrophy. No spinal canal stenosis. Mild bilateral neural foraminal stenosis. L5-S1: Disc desiccation and disc height loss. Diffuse disc bulge. Bilateral facet hypertrophy. No spinal canal or foraminal stenosis. Mild bilateral subarticular stenosis.     IMPRESSION: 1.  Multilevel degenerative disc disease and facet hypertrophy of the lumbar spine, as described. Moderate to severe spinal canal stenosis at L3-L4. 2.  Mild to moderate multilevel neural foraminal stenosis. No high-grade neural foraminal stenosis. 3.  Grade 1 anterolisthesis of L3 on L4 and L4 on L5. 4.  Suspect numerous T2 hyperintense hepatic lesions, similar to prior and likely representing hepatic cysts.

## 2024-04-17 NOTE — LETTER
4/17/2024         RE: Andree Trujillo  [address] [facility] [address]        Dear Colleague,    Thank you for referring your patient, Andree Trujillo, to the [facility]. Please see a copy of my visit note below.     ASSESSMENT: Andree Trujillo is a 73 year old female who presents for consultation at the request of PCP Golden Christensen, with a past medical history significant for vitamin D deficiency, hypercholesterolemia, ovarian cyst, hypertension, osteopenia, restless leg syndrome, spinal stenosis who presents today for new patient evaluation of:    -Chronic bilateral low back pain that is typically worse with standing and walking however some pain constantly as well.  Lumbar MRI with moderate to severe spinal stenosis at L3-4.  Facet arthropathy greatest at L4-5 and L3-4 due to spondylolisthesis.    Patient is neurologically intact on exam. No myelopathic or red flag symptoms.          No data to display                     Diagnoses and all orders for this visit:  Spinal stenosis of lumbar region with neurogenic claudication  -     Spine  Referral  -     Physical Therapy  Referral; Future  -     PAIN Transforaminal SAI Inj Lumbosacral [place]; Future  Lumbar foraminal stenosis  -     Physical Therapy  Referral; Future  -     PAIN Transforaminal SAI Inj Lumbosacral [place]; Future  Lumbar facet arthropathy  -     Physical Therapy  Referral; Future    PLAN:  Reviewed spine anatomy and disease process. Discussed diagnosis and treatment options with the patient today. A shared decision making model was used.  The patient's values and choices were respected. The following represents what was discussed and decided upon by the provider and the patient.      -DIAGNOSTIC TESTS:  Images were personally reviewed and interpreted and explained to patient today using spine model.   -Lumbar spine MRI 4/3/2024 with multilevel degenerative changes with facet  arthropathy.  L3-4 moderate to severe spinal stenosis with right greater than left foraminal stenosis.  Mild spinal stenosis with mild bilateral foraminal stenosis.  L4-5 mild bilateral foraminal stenosis.  L5-S1 mild subarticular stenosis.  L2-3    -PHYSICAL THERAPY: Referral placed to physical therapy [facility] for establishing home exercises for core strengthening.  Discussed the importance of core strengthening, ROM, stretching exercises with the patient and how each of these entities is important in decreasing pain.  Explained to the patient that the purpose of physical therapy is to teach the patient a home exercise program.  These exercises need to be performed every day in order to decrease pain and prevent future occurrences of pain.        -MEDICATIONS: No changes to medications today.  Discussed multiple medication options today with patient. Discussed risks, side effects, and proper use of medications. Patient verbalized understanding.    -INTERVENTIONS: Order placed for bilateral L3-4 transforaminal epidural steroid injection to see if we get further relief of stenosis related back pain/neurogenic claudication.  -If no benefit with this injection we could consider a bilateral L2, L3, L4 medial branch block targeting the facet joints at L3-4 and L4-5 for possible RFA.  Discussed risks and benefits of injections with patient today.    -PATIENT EDUCATION:  Total time of 46 minutes, on the day of service, spent with the patient, reviewing the chart, placing orders, and documenting.     -FOLLOW-UP:   Follow-up for injection at the spine center then 2 weeks postinjection    Advised patient to call the Spine Center if symptoms worsen or you have problems controlling bladder and bowel function.   ______________________________________________________________________    SUBJECTIVE:  HPI:  Andree PICKENS Ronnie  Is a 73 year old female who presents today for new patient evaluation of low back pain  bilateral lower lumbar spine that is ongoing for decades, has been worsening over the last couple of years however and specifically over the last couple of months.  Currently her pain is a constant 1/10 up to an 8 at its worst and most aggravated with standing and walking but it does aggravate with bending and twisting with housework as well.  Overall feels much more tolerable with sitting.  Currently denies any radiating lower extremity pain but she does report occasional weakness in her legs with walking when her pain is severe.  Denies any episodes of her legs giving out on her.  Denies numbness or tingling sensations.  Denies bowel or bladder loss control, denies saddle anesthesia.    -Patient reports that she does have chronic knee pain and has had injections last in November 2023 with [facility].    -Treatment to Date: No prior spinal surgery  Physical therapy 2018 with [facility], nothing recent    History of lumbar injections in 2018 with [facility] with some relief.    -Medications:  Gabapentin 300 mg, 3 - 3 - 3    Current Outpatient Medications   Medication Sig Dispense Refill     ASPIRIN 81 MG OR TABS 1 TABLET DAILY       gabapentin (NEURONTIN) 300 MG capsule Take 3 capsules (900 mg) by mouth 3 times daily TAKE 3 CAPSULES BY MOUTH THREE TIMES DAILY 810 capsule 3     amLODIPine (NORVASC) 10 MG tablet Take 1 tablet (10 mg) by mouth every morning Appointment needed for further refills 90 tablet 3     atorvastatin (LIPITOR) 10 MG tablet Take 1 tablet (10 mg) by mouth daily 90 tablet 3     hydrochlorothiazide (MICROZIDE) 12.5 MG capsule Take 1 capsule by mouth once daily 90 capsule 1     metoprolol succinate ER (TOPROL XL) 100 MG 24 hr tablet Take 1 tablet (100 mg) by mouth daily 90 tablet 3     Multiple Vitamin (DAILY MULTIVITAMIN PO) Take 1 tablet by mouth daily        pramipexole (MIRAPEX) 0.125 MG tablet Take 1-2 tablets (0.125-0.25 mg) by mouth at bedtime Appointment needed for further refills 180 tablet  3     Current Facility-Administered Medications   Medication Dose Route Frequency Provider Last Rate Last Admin     3 mL ropivacaine (NAROPIN) injection 5 mg/mL  3 mL   Leroy Peacock, DO   3 mL at 11/10/23 1613     3 mL ropivacaine (NAROPIN) injection 5 mg/mL  3 mL   RepLeroy hutton, DO   3 mL at 04/14/23 1611     hylan (SYNVISC ONE) injection 48 mg  48 mg   RepaLeroy, DO   48 mg at 11/10/23 1613     hylan (SYNVISC ONE) injection 48 mg  48 mg   Repa, Leroy Webb, DO   48 mg at 04/14/23 1614     hylan (SYNVISC ONE) injection 48 mg  48 mg   Repbrennen, Leroy Webb, DO   48 mg at 03/11/22 1530     triamcinolone (KENALOG-40) injection 40 mg  40 mg   Repbrennen, Leroy Webb, DO   40 mg at 11/10/23 1613     triamcinolone (KENALOG-40) injection 40 mg  40 mg   RepLeroy hutton, DO   40 mg at 04/14/23 1611       Allergies   Allergen Reactions     Erythromycin Rash     Clindamycin Itching     Penicillins Rash       Past Medical History:   Diagnosis Date     Allergies      Anxiety      Cyst of breast      Essential hypertension      Osteoarthritis of spine with radiculopathy, lumbar region      Osteopenia      Right hip pain      Right knee pain      Spinal stenosis of lumbar region with neurogenic claudication      Tendinitis of both feet         Patient Active Problem List   Diagnosis     Osteopenia of both hips     Hypercholesterolemia     Essential hypertension     Vitamin D deficiency     Cyst of ovary     Spinal stenosis of lumbar region with neurogenic claudication     DDD (degenerative disc disease), lumbar     Restless leg syndrome       Past Surgical History:   Procedure Laterality Date     HYSTERECTOMY TOTAL ABDOMINAL      11 years ago. Benign ovarian tumor. Everything removed.     HYSTERECTOMY, PAP NO LONGER INDICATED  2009    Total hysterectomy with bilateral oophorectomy due to Pelvic pain       Family History   Problem Relation Age of Onset     Breast Cancer Mother   "    Arthritis Mother      Hypertension Father      Alcohol/Drug Father      Arthritis Father      Cerebrovascular Disease Maternal Grandmother      Cancer Maternal Grandmother      Cancer Maternal Grandfather      Hypertension Paternal Grandfather      Hypertension Brother      Prostate Cancer Brother      Alcohol/Drug Brother      Allergies Brother      Diabetes Paternal Aunt      Uterine Cancer Sister      Coronary Artery Disease No family hx of        Reviewed past medical, surgical, and family history with patient found on new patient intake packet located in EMR Media tab.     SOCIAL HX: Patient is single and retired.  Patient denies smoking/tobacco use.  Reports seldom alcohol use, denies history being a heavy drinker, denies recreational drug use.    ROS: Positive for joint pain muscle pain, muscle fatigue, imbalance, insomnia, excessive tiredness, diarrhea, cough, changes in vision, hoarseness, weight gain.  Specifically negative for bowel/bladder dysfunction, balance changes, headache, dizziness, foot drop, fevers, chills, appetite changes, nausea/vomiting, unexplained weight loss. Otherwise 13 systems reviewed are negative. Please see the patient's intake questionnaire from today for details.    OBJECTIVE:  BP (!) 140/77 (BP Location: Right arm, Patient Position: Sitting, Cuff Size: Adult Regular)   Pulse 66   Ht 5' 4.5\" (1.638 m)   Wt 185 lb 14.4 oz (84.3 kg)   BMI 31.42 kg/m      PHYSICAL EXAMINATION:    --CONSTITUTIONAL:  Vital signs as above.  No acute distress.  The patient is well nourished and well groomed.  --PSYCHIATRIC:  Appropriate mood and affect. The patient is awake, alert, oriented to person, place, time and answering questions appropriately with clear speech.    --SKIN:  Skin over the face, bilateral lower extremities, and posterior torso is clean, dry, intact without rashes.    --RESPIRATORY: Normal rhythm and effort. No abnormal accessory muscle breathing patterns noted.   --STANDING " EXAMINATION:  Normal lumbar lordosis noted, no lateral shift.  --MUSCULOSKELETAL: Range of motion is not limited in lumbar flexion, extension, lateral rotation. No tenderness to palpation lumbar spine. Straight leg raising is negative to radicular pain. Sciatic notch non-tender.  --GROSS MOTOR: Gait is non-antalgic. Easily arises from a seated position.   --LOWER EXTREMITY MOTOR TESTING:  Plantar flexion left 5/5, right 5/5   Dorsiflexion left 5/5, right 5/5   Great toe MTP extension left 5/5, right 5/5  Knee flexion left 5/5, right 5/5  Knee extension left 5/5, right 5/5   Hip flexion left 5/5, right 5/5  --HIPS: Full range of motion bilaterally.   --NEUROLOGICAL:  1/4 patellar, medial hamstring, and achilles reflexes bilaterally.  Sensation to light touch is intact in the bilateral L4, L5, and S1 dermatomes. Babinski is negative. No clonus.  Negative Santa reflex bilaterally.  --VASCULAR:  Bilateral lower extremities are warm.  There is no pitting edema of the bilateral lower extremities.    RESULTS: Prior medical records from [facility] were reviewed today.    Imaging: Spine imaging was personally reviewed and interpreted today. The images were shown to the patient and the findings were explained using a spine model.      MR Lumbar Spine w/o Contrast    Result Date: 4/3/2024  EXAM: MR LUMBAR SPINE W/O CONTRAST LOCATION: [facility] DATE: 4/3/2024 INDICATION: ongoing chronic lower back pain with progressive symptoms COMPARISON: 10/04/2021 TECHNIQUE: Routine Lumbar Spine MRI without IV contrast. FINDINGS: Nomenclature is based on 5 lumbar type vertebral bodies. There is 2 mm anterolisthesis of C3 on C4 and 5 mm anterolisthesis of C4 on C5, similar to prior. Normal vertebral body heights and marrow signal. Normal distal spinal cord and cauda equina with conus medullaris at L1. Suspect numerous T2 hyperintense hepatic lesions, similar prior and likely  representing hepatic cysts. Unremarkable visualized bony pelvis. T12-L1: Normal disc height and signal. No herniation. Normal facets. No spinal canal or neural foraminal stenosis. L1-L2: Normal disc height and signal. No herniation. Normal facets. No spinal canal or neural foraminal stenosis. L2-L3: Disc desiccation and disc height loss. Diffuse disc bulge. Bilateral facet hypertrophy. Mild spinal canal stenosis. Mild bilateral neural foraminal stenosis. L3-L4: Disc desiccation and disc height loss. Diffuse disc bulge. Bilateral ligament flavum and facet hypertrophy. Moderate to severe spinal canal stenosis. Mild-to-moderate right greater than left neural foraminal stenosis. Mild bilateral subarticular stenosis. L4-L5: Disc desiccation and disc height loss. Diffuse disc bulge. Bilateral facet hypertrophy. No spinal canal stenosis. Mild bilateral neural foraminal stenosis. L5-S1: Disc desiccation and disc height loss. Diffuse disc bulge. Bilateral facet hypertrophy. No spinal canal or foraminal stenosis. Mild bilateral subarticular stenosis.     IMPRESSION: 1.  Multilevel degenerative disc disease and facet hypertrophy of the lumbar spine, as described. Moderate to severe spinal canal stenosis at L3-L4. 2.  Mild to moderate multilevel neural foraminal stenosis. No high-grade neural foraminal stenosis. 3.  Grade 1 anterolisthesis of L3 on L4 and L4 on L5. 4.  Suspect numerous T2 hyperintense hepatic lesions, similar to prior and likely representing hepatic cysts.                Again, thank you for allowing me to participate in the care of your patient.        Sincerely,        Liset Goyal, CNP

## 2024-05-01 DIAGNOSIS — I10 ESSENTIAL HYPERTENSION: ICD-10-CM

## 2024-05-02 RX ORDER — HYDROCHLOROTHIAZIDE 12.5 MG/1
CAPSULE ORAL
Qty: 90 CAPSULE | Refills: 1 | Status: SHIPPED | OUTPATIENT
Start: 2024-05-02

## 2024-05-08 ENCOUNTER — TELEPHONE (OUTPATIENT)
Dept: ORTHOPEDICS | Facility: CLINIC | Age: 74
End: 2024-05-08
Payer: COMMERCIAL

## 2024-05-08 DIAGNOSIS — M17.12 PRIMARY OSTEOARTHRITIS OF LEFT KNEE: Primary | ICD-10-CM

## 2024-05-08 NOTE — TELEPHONE ENCOUNTER
[facility] vs steroid injection of right knee.        SynviscOne injection last completed 11/10/24.  Patient reports relief for 5+ months       Patient has failed trial of OTC NSAIDs/Pain Medication (ibuprofen, tylenol, naproxen,...):  Yes       Patient has completed trial of physical therapy: No    Prior authorization referral for SynviscOne injection pended.    Please advise    Mo Scott ATC

## 2024-05-15 ENCOUNTER — RADIOLOGY INJECTION OFFICE VISIT (OUTPATIENT)
Dept: PHYSICAL MEDICINE AND REHAB | Facility: CLINIC | Age: 74
End: 2024-05-15
Attending: NURSE PRACTITIONER
Payer: COMMERCIAL

## 2024-05-15 VITALS
TEMPERATURE: 97.3 F | SYSTOLIC BLOOD PRESSURE: 126 MMHG | OXYGEN SATURATION: 95 % | RESPIRATION RATE: 16 BRPM | HEART RATE: 64 BPM | DIASTOLIC BLOOD PRESSURE: 68 MMHG

## 2024-05-15 DIAGNOSIS — M48.062 SPINAL STENOSIS OF LUMBAR REGION WITH NEUROGENIC CLAUDICATION: ICD-10-CM

## 2024-05-15 DIAGNOSIS — M48.061 LUMBAR FORAMINAL STENOSIS: ICD-10-CM

## 2024-05-15 PROCEDURE — 64483 NJX AA&/STRD TFRM EPI L/S 1: CPT | Mod: 50 | Performed by: PAIN MEDICINE

## 2024-05-15 RX ORDER — DEXAMETHASONE SODIUM PHOSPHATE 10 MG/ML
INJECTION, SOLUTION INTRAMUSCULAR; INTRAVENOUS
Status: COMPLETED | OUTPATIENT
Start: 2024-05-15 | End: 2024-05-15

## 2024-05-15 RX ORDER — LIDOCAINE HYDROCHLORIDE 10 MG/ML
INJECTION, SOLUTION EPIDURAL; INFILTRATION; INTRACAUDAL; PERINEURAL
Status: COMPLETED | OUTPATIENT
Start: 2024-05-15 | End: 2024-05-15

## 2024-05-15 RX ADMIN — DEXAMETHASONE SODIUM PHOSPHATE 10 MG: 10 INJECTION, SOLUTION INTRAMUSCULAR; INTRAVENOUS at 08:47

## 2024-05-15 RX ADMIN — LIDOCAINE HYDROCHLORIDE 4 ML: 10 INJECTION, SOLUTION EPIDURAL; INFILTRATION; INTRACAUDAL; PERINEURAL at 08:47

## 2024-05-15 ASSESSMENT — PAIN SCALES - GENERAL
PAINLEVEL: NO PAIN (0)
PAINLEVEL: NO PAIN (0)

## 2024-05-15 NOTE — PATIENT INSTRUCTIONS
Follow-up visit with Liset Goyal CNP in 2 weeks to discuss injection outcome and determine care plan going forward.       DISCHARGE INSTRUCTIONS    During office hours (8:00 a.m.- 4:00 p.m.) questions or concerns may be answered  by calling Spine Center Navigation Nurses at  [phone].  Messages received after hours will be returned the following business day.      In the case of an emergency, please dial 911 or seek assistance at the nearest Emergency Room/Urgent Care facility.     All Patients:    You may experience an increase in your symptoms for the first 2 days (It may take anywhere between 2 days- 2 weeks for the steroid to have maximum effect).    You may use ice on the injection site, as frequently as 20 minutes each hour if needed.    You may take your pain medicine.    You may continue taking your regular medication after your injection. If you have had a Medial Branch Block you may resume pain medication once your pain diary is completed.    You may shower. No swimming, tub bath or hot tub for 48 hours.  You may remove your bandaid/bandage as soon as you are home.    You may resume light activities, as tolerated.    Resume your usual diet as tolerated.    It is strongly advised that you do not drive for 1-3 hours post injection.    If you have had oral sedation:  Do not drive for 8 hours post injection.      If you have had IV sedation:  Do not drive for 24 hours post injection.  Do not operate hazardous machinery or make important personal/business decisions for 24 hours.      POSSIBLE STEROID SIDE EFFECTS (If steroid/cortisone was used for your procedure)    -If you experience these symptoms, it should only last for a short period    Swelling of the legs              Skin redness (flushing)     Mouth (oral) irritation   Blood sugar (glucose) levels            Sweats                    Mood changes  Headache  Sleeplessness  Weakened immune system for up to 14 days, which could increase the  risk of rebecca the COVID-19 virus and/or experiencing more severe symptoms of the disease, if exposed.  Decreased effectiveness of the flu vaccine if given within 2 weeks of the steroid.         POSSIBLE PROCEDURE SIDE EFFECTS  -Call the Spine Center if you are concerned  Increased Pain           Increased numbness/tingling      Nausea/Vomiting          Bruising/bleeding at site      Redness or swelling                                              Difficulty walking      Weakness           Fever greater than 100.5    *In the event of a severe headache after an epidural steroid injection that is relieved by lying down, please call the [facility] to speak with a clinical staff member*

## 2024-05-17 ENCOUNTER — TELEPHONE (OUTPATIENT)
Dept: PHYSICAL MEDICINE AND REHAB | Facility: CLINIC | Age: 74
End: 2024-05-17
Payer: COMMERCIAL

## 2024-05-17 NOTE — TELEPHONE ENCOUNTER
"PSP:  Liset Goyal, CNP   Last clinic visit:  4/17/24 OV; 5/16/24 Bilateral L3-L4 TFESIs  Reason for call: swelling in feel and ankles since injection  Clinical information:  Calling to report she has had swelling in her feet and ankles since Thursday, day after injection. This is not usual for her. Went to sleep last night and swelling better this AM, however it has returned through day. Reports she has had them up for last couple hours without relief/resolution. \"It isn't really bad; it looks like fat feet and I don't have it normally.\"   Advice given to patient: Provider will be updated of concern.   Provider to address: Please advise   "

## 2024-05-17 NOTE — TELEPHONE ENCOUNTER
The steroid medication can cause swelling.  If symptoms worsen and there is seems to be more unilateral or pain with the swelling then I recommend being seen in the emergency department for possible DVT.  I do not believe this is the case.  Again I believe it is likely secondary to the steroid medication.

## 2024-05-24 ENCOUNTER — OFFICE VISIT (OUTPATIENT)
Dept: ORTHOPEDICS | Facility: CLINIC | Age: 74
End: 2024-05-24
Payer: COMMERCIAL

## 2024-05-24 ENCOUNTER — ANCILLARY PROCEDURE (OUTPATIENT)
Dept: GENERAL RADIOLOGY | Facility: CLINIC | Age: 74
End: 2024-05-24
Attending: FAMILY MEDICINE
Payer: COMMERCIAL

## 2024-05-24 VITALS
SYSTOLIC BLOOD PRESSURE: 132 MMHG | HEIGHT: 65 IN | HEART RATE: 58 BPM | BODY MASS INDEX: 31.42 KG/M2 | DIASTOLIC BLOOD PRESSURE: 83 MMHG

## 2024-05-24 DIAGNOSIS — M17.0 PRIMARY OSTEOARTHRITIS OF BOTH KNEES: ICD-10-CM

## 2024-05-24 DIAGNOSIS — M25.562 BILATERAL KNEE PAIN: ICD-10-CM

## 2024-05-24 DIAGNOSIS — M25.561 BILATERAL KNEE PAIN: ICD-10-CM

## 2024-05-24 DIAGNOSIS — M25.561 ACUTE PAIN OF BOTH KNEES: Primary | ICD-10-CM

## 2024-05-24 DIAGNOSIS — M76.02 GLUTEAL TENDINITIS OF LEFT BUTTOCK: ICD-10-CM

## 2024-05-24 DIAGNOSIS — M16.11 PRIMARY OSTEOARTHRITIS OF RIGHT HIP: ICD-10-CM

## 2024-05-24 DIAGNOSIS — M25.562 ACUTE PAIN OF BOTH KNEES: Primary | ICD-10-CM

## 2024-05-24 PROCEDURE — 99214 OFFICE O/P EST MOD 30 MIN: CPT | Mod: 25 | Performed by: FAMILY MEDICINE

## 2024-05-24 PROCEDURE — 20611 DRAIN/INJ JOINT/BURSA W/US: CPT | Mod: 50 | Performed by: FAMILY MEDICINE

## 2024-05-24 PROCEDURE — 20610 DRAIN/INJ JOINT/BURSA W/O US: CPT | Mod: LT | Performed by: FAMILY MEDICINE

## 2024-05-24 PROCEDURE — 73562 X-RAY EXAM OF KNEE 3: CPT | Mod: TC | Performed by: RADIOLOGY

## 2024-05-24 RX ORDER — TRIAMCINOLONE ACETONIDE 40 MG/ML
40 INJECTION, SUSPENSION INTRA-ARTICULAR; INTRAMUSCULAR
Status: SHIPPED | OUTPATIENT
Start: 2024-05-24

## 2024-05-24 RX ORDER — ROPIVACAINE HYDROCHLORIDE 5 MG/ML
3 INJECTION, SOLUTION EPIDURAL; INFILTRATION; PERINEURAL
Status: SHIPPED | OUTPATIENT
Start: 2024-05-24

## 2024-05-24 RX ADMIN — TRIAMCINOLONE ACETONIDE 40 MG: 40 INJECTION, SUSPENSION INTRA-ARTICULAR; INTRAMUSCULAR at 14:15

## 2024-05-24 RX ADMIN — ROPIVACAINE HYDROCHLORIDE 3 ML: 5 INJECTION, SOLUTION EPIDURAL; INFILTRATION; PERINEURAL at 14:16

## 2024-05-24 RX ADMIN — TRIAMCINOLONE ACETONIDE 40 MG: 40 INJECTION, SUSPENSION INTRA-ARTICULAR; INTRAMUSCULAR at 14:16

## 2024-05-24 RX ADMIN — ROPIVACAINE HYDROCHLORIDE 3 ML: 5 INJECTION, SOLUTION EPIDURAL; INFILTRATION; PERINEURAL at 14:15

## 2024-05-24 ASSESSMENT — PAIN SCALES - GENERAL: PAINLEVEL: SEVERE PAIN (6)

## 2024-05-24 NOTE — LETTER
2024         RE: Andree Trujillo  [address] [facility] [address]        Dear Colleague,    Thank you for referring your patient, Andree Trujillo, to the [facility]. Please see a copy of my visit note below.    Andree Trujillo  :  1950  DOS: 2024  MRN: [id number]    Sports Medicine Clinic Visit    PCP: Yani Miller    Andree Trujillo is a 70 year old female who is seen in follow-up presenting with chronic left knee and right hip pain.    Interim History - 2023  Since last visit on 3/11/2022, patient has increased pain in the knee,more so than her hip. Last synvisc injection gave relief for around a year in the knee. Both knee and hip are starting to bother her again.  No interim injury.       Interim History - 2021  Since last visit on 2021 patient has moderate-severe left knee and right hip pain.  Left knee steroid injection completed on 2020 by Sin Navarrete PA-C provided good relief for ~ 3 months.  Right hip intra-articular steroid injection was last completed 21 with good relief for ~ 3 months.  Patient notes that hip and knee pain are worse with walking and going from sit to stand.  She is also noting generalized lower lumbar spine pain with walking and bending at the waist.  Currently treating with OTC pain medication with only mild relief.  No new injury in the interim.    Social History: retired    Interim History - 2022  Since last visit on 2021 patient has noticed an increase in medial left knee pain.  Left knee corticosteroid injection completed on 2021 provided 6 months of great relief and pain has returned over the past month and a half. She takes Advil. Moments of instability noted and denies swelling and tingling. She is interested in a repeat corticosteroid injection today.  No new injury in the interim.    Interim History - November 10, 2023  Since last visit on 2023 patient  has recurrent pain in the left knee and right hip region.  Prior injections were helpful and interested in repeat options today, though also endorsing new posterior hip pain on the right.  No interim injury.       Interim History - May 24, 2024  Since last visit on 11/10/23 patient has noted worsening right knee pain and mild swelling over the last 2-3 weeks while walking up steps. She notes that the right knee is more painful than the left knee today. Pain is worse with flexion and walking. She has not tried any treatment for the right knee.  11/10/23 left knee Synvisc injection and right hip joint steroid injection was performed. Right knee and left hip pain both returned about 1 month ago. She reports good relief with prior injections. No interim injury.       Review of Systems  Musculoskeletal: as above  Remainder of review of systems is negative including constitutional, CV, pulmonary, GI, Skin and Neurologic except as noted in HPI or medical history.    Past Medical History:   Diagnosis Date     Allergies      Anxiety      Cyst of breast      Essential hypertension      Osteoarthritis of spine with radiculopathy, lumbar region      Osteopenia      Right hip pain      Right knee pain      Spinal stenosis of lumbar region with neurogenic claudication      Tendinitis of both feet      Past Surgical History:   Procedure Laterality Date     HYSTERECTOMY TOTAL ABDOMINAL      11 years ago. Benign ovarian tumor. Everything removed.     HYSTERECTOMY, PAP NO LONGER INDICATED  2009    Total hysterectomy with bilateral oophorectomy due to Pelvic pain     Family History   Problem Relation Age of Onset     Breast Cancer Mother      Arthritis Mother      Hypertension Father      Alcohol/Drug Father      Arthritis Father      Cerebrovascular Disease Maternal Grandmother      Cancer Maternal Grandmother      Cancer Maternal Grandfather      Hypertension Paternal Grandfather      Hypertension Brother      Prostate Cancer  Brother      Alcohol/Drug Brother      Allergies Brother      Diabetes Paternal Aunt      Uterine Cancer Sister      Coronary Artery Disease No family hx of        Objective  BP (!) 163/92   Pulse 67   Wt 83.6 kg (184 lb 4.8 oz)   BMI 31.15 kg/m      General: healthy, alert and in no distress    HEENT: no scleral icterus or conjunctival erythema   Skin: no suspicious lesions or rash. No jaundice.   CV: regular rhythm by palpation, 2+ distal pulses, no pedal edema    Resp: normal respiratory effort without conversational dyspnea   Psych: normal mood and affect    Gait: mildly antalgic, appropriate coordination and balance   Neuro: normal light touch sensory exam of the extremities. Motor strength as noted below     Bilateral Knee exam    ROM:        Flexion 130 degrees       Extension -2 degrees       Range of motion limited by pain in terminal flexion > extension, R>L    Inspection:       no visible ecchymosis       effusion noted trace left, small/moderate right    Skin:       no visible deformities       well perfused       capillary refill brisk    Patellar Motion:        Crepitus noted in the patellofemoral joint    Tender:        medial joint line       lateral joint line    Non Tender:         remainder of knee area        along MCL        distal IT Band        infrapatellar tendon        tibial tubercle       pes anserine bursa    Special Tests:        neg (-) varus at 0 deg and 30 deg       neg (-) valgus at 0 deg and 30 deg    Right hip exam     Inspection:        no edema or ecchymosis in hip area     ROM:       Flexion 120       internal rotation 20      external rotation 40      Range of motion limited by pain with terminal adduction>flexion and IR     Strength:        flexion 5-/5       extension 5/5       abduction 5-/5       adduction 5-/5     Tender:        greater trochanter moderate       Anterior hip joint mild       Gluteal muscles, glute med and pirformis     Non Tender:        remainder of  hip area       illiac crest       ASIS       pubis     Sensation:        grossly intact in hip and thigh     Skin:       well perfused       capillary refill brisk     Special Tests:        neg (-) NATI       weakly positive (+) FADIR       negative scour, neg Albert    Large Joint Injection/Arthocentesis: L knee joint    Date/Time: 5/24/2024 2:14 PM    Performed by: Leroy Peacock DO  Authorized by: Leroy Peacock DO    Indications:  Pain and osteoarthritis  Needle Size:  21 G  Guidance: ultrasound    Approach:  Superolateral  Location:  Knee      Medications:  48 mg hylan 48 MG/6ML  Aspirate amount (mL):  3  Aspirate:  Serous and yellow  Outcome:  Tolerated well, no immediate complications  Procedure discussed: discussed risks, benefits, and alternatives    Consent Given by:  Patient  Timeout: timeout called immediately prior to procedure    Prep: patient was prepped and draped in usual sterile fashion     Ultrasound images of procedure were permanently stored.     Large Joint Injection/Arthocentesis: R knee joint    Date/Time: 5/24/2024 2:15 PM    Performed by: Leroy Peacock DO  Authorized by: Leroy Peacock DO    Indications:  Pain and osteoarthritis  Needle Size:  21 G  Guidance: ultrasound    Approach:  Superolateral  Location:  Knee      Medications:  40 mg triamcinolone 40 MG/ML; 3 mL ROPivacaine 5 MG/ML  Aspirate amount (mL):  10  Aspirate:  Serous and yellow  Outcome:  Tolerated well, no immediate complications  Procedure discussed: discussed risks, benefits, and alternatives    Consent Given by:  Patient  Timeout: timeout called immediately prior to procedure    Prep: patient was prepped and draped in usual sterile fashion     Ultrasound images of procedure were permanently stored.     Large Joint Injection/Arthocentesis: R greater trochanteric bursa    Date/Time: 5/24/2024 2:16 PM    Performed by: Leroy Peacock DO  Authorized by: Leroy Peacock  DO    Indications:  Pain  Needle Size:  22 G  Guidance: landmark guided    Approach:  Lateral  Location:  Hip      Site:  R greater trochanteric bursa  Medications:  40 mg triamcinolone 40 MG/ML; 3 mL ROPivacaine 5 MG/ML  Outcome:  Tolerated well, no immediate complications  Procedure discussed: discussed risks, benefits, and alternatives    Consent Given by:  Patient  Timeout: timeout called immediately prior to procedure    Prep: patient was prepped and draped in usual sterile fashion        Radiology  HIP RIGHT 2-3 VIEWS   12/2/2020 11:05 AM      HISTORY: Hip pain, right.     FINDINGS: Lower lumbar spine degenerative change.                                                                      IMPRESSION:   1. Mild femoral head osteophytosis consistent with early  osteoarthritis.  2. There is a acetabular pincer-type configuration. This can  predispose to femoroacetabular impingement, and clinical correlation  is recommended.    KNEE LEFT THREE VIEWS December 2, 2020 11:04 AM      HISTORY: Left knee pain, unspecified chronicity.                                                                      IMPRESSION:   1. Slight lateral patellar subluxation.  2. I cannot exclude one or two loose bodies in the notch region.  3. No fracture identified.  4. Small asymmetric dense area within the tibial proximal diaphysis.  This could represent a normal variant area of focal trabeculation, but  a small benign enchondroma or chronic bone infarct cannot be excluded.     Recent Results (from the past 744 hour(s))   PAIN Transforaminal SAI Inj Lumbosacral Dalton    Narrative    LUMBAR EPIDURAL STEROID INJECTION TRANSFORAMINAL APPROACH WITH   FLUOROSCOPIC GUIDANCE  Performed on: 5/15/24    Pre Procedure Diagnosis:  LBP, Lumbar radiculitis  Post Procedure Diagnosis:  Same  Procedure Performed:  Lumbar Transforaminal Epidural Steroid Injection   with Fluoroscopic Guidance  Clinical Scenario:  As per office notes  Anesthesia/Fluids:  As  per intra-procedure documentation  Vital Signs:  As per intra-procedure documentation  Level Injected: L3-4  Side Injected: Bilateral  CC:    Andree Trujillo  is a 73 year-old female who presents today for a bilateral   L3-4 transforminal epidural steroid injections as ordered by Liset Goyal.  The patient complains of lower extremity pain.  The patient's   MRI is reviewed.  The patient would like to proceed with the injection   today.     The patient denies any symptoms of an active infection and denies taking   antibiotics. The patient denies taking any prescription blood thinning   medications. The patient denies any allergies to iodine or iodine   contrast.      The patient has had other conservative treatment but wishes to pursue   spine injections.  The procedure of lumbar transforaminal epidural steroid   injection was discussed in detail, using a spine model to demonstrate.    The patient had the opportunity to directly ask the physician questions   regarding the procedure and the questions were answered prior to the   consent form being presented.  The risks of the procedure, including but   not limited to:  Back pain/soreness, infection, bleeding, permanent, nerve   damage/injury, epidural hematoma (with the need for surgical evaluation),   paralysis, allergic reaction,  worsening of back/leg pain or no change in   pain. The patient elected to proceed and signed informed consent.       The patient was placed in a prone position on the fluoroscopy table.  A   procedure pause was performed to verify the patient's identity, site, and   side of the procedure.    The lower back was prepped and draped in usual   fashion.  After anesthetizing the skin with 1% lidocaine, a 5 inch, 22   gauge needle was introduced at the rightL3 pedicle under fluoroscopic   guidance.  After aspiration was negative for blood or CSF, 1 mL of   Omnipaque-300 was injected on each side.  An epidural flow pattern without   vascular  uptake was seen.   Subsequently, 1 mL of 1% lidocaine was   injected on the right side.  After waiting 2 minutes the patient sensation   and muscle strength was tested and found to have no substantial change in   strength or sensation.  Then 1 mL 10 mg of dexamethasone was slowly   injected on the right side.  The exact same procedure was repeated on the   left side with with a test dose of 1 mL of 1% lidocaine, followed by a 1   mL 10 mg of dexamethasone was injected epidural flow pattern was seen.     PRE-PROCEDURE PAIN SCORE: 0/10  POST-PROCEDURE PAIN SCORE:  0/10     The patient tolerated the procedure well.  After a short period of   observation the patient was discharged under their own power.   The   patient was instructed to call the Spine Clinic if any questions/concerns   arise after the procedure.  Patient will follow-up with Liset Goyal   in 2 to 4 weeks.     XR Knee Standing Bilateral 3 Views    Narrative    EXAM: XR KNEE STANDING BILATERAL 3 VIEWS  DATE/TIME: 5/24/2024 1:28 PM    INDICATION: Bilateral knee pain; Bilateral knee pain  COMPARISON: 12/2/2020      Impression    IMPRESSION:     Right: Mild narrowing of the medial and moderate narrowing of the  patellofemoral compartments. No acute fracture. Small joint effusion.    Left: Advanced narrowing of the medial compartment with slight genu  varus. Moderate narrowing of the patellofemoral compartment. No acute  fracture. Trace joint effusion. Benign chondroid lesion within the  proximal tibial metadiaphysis, likely an enchondroma and unchanged.    SHIRA PARKINSON DO         SYSTEM ID:  [id number]       Assessment:  (M25.561,  M25.562) Acute pain of both knees  (primary encounter diagnosis)  Plan: XR Knee Standing Bilateral 3 Views  (M17.0) Primary osteoarthritis of both knees  (M76.02) Gluteal tendinitis of left buttock  (M16.11) Primary osteoarthritis of right hip      Plan:  Discussed the assessment with the patient.  Follow up: As needed  based on clinical progress, plan for 4 weeks if relief is incomplete in hip or knee regions  Known left knee arthritis, likely compensatory component between the right hip and left knee, reviewed in detail again today  Medial compartment narrowing has progressed on XR, reviewed today, also with milder DJD changes in the right knee which is more acutely painful  Right hip joint injection helped significantly at prior visit, not having the same groin pain as before, more posterior and lateral hip pain today, consistent with some measure of trochanteric bursitis and gluteal tendinitis  Reviewed option for alternative injection locations for the hip, can revisit if not significantly improved after 4 weeks  Repeat US guided left knee viscosupplementation injection today, reviewed goals  US guided right knee CSI with aspiration today  GTB corticosteroid injection performed today as well  Prior and new XR images independently visualized and reviewed with patient again today in clinic  Physical therapy options and low impact impact activity strategies to build core strength reviewed in detail today  Reviewed options for potential steroid vs viscosupplementation injections and the possibility for future orthopedic referral prn for the knee  Reviewed safe and appropriate OTC medication choices, try tylenol first  Up to 3000mg daily of tylenol is generally safe, NSAID dosing and duration limitations reviewed  Discussed nature of degenerative arthrosis of the knee and hip  Discussed symptom treatment with ice or heat, topical treatments, and rest if needed.   Expectations and limitations of synvisc were reviewed in detail  Often 4-6 weeks before full effect may be noticed  Usually covered up to every 6 months by insurance, but does not need to be repeated unless pain returns, at which point we would re-evaluate  Potential use of CSI in future for flares of pain reviewed in detail  Encouraged modified progressive pain-free  activity as tolerated  Expectations and goals of CSI reviewed  Often 2-3 days for steroid effect, and can take up to two weeks for maximum effect  We discussed modified progressive pain-free activity as tolerated  Do not overuse in first two weeks if feeling better due to concern for vulnerability while steroid is working  Supportive care reviewed  All questions were answered today  Contact us with additional questions or concerns  Signs and sx of concern reviewed      Leroy Peacock DO, MANI  Sports Medicine Physician  [facility]      Time spent in chart review, one-on-one evaluation, discussion with patient regarding: nature of problem, clinical course, prior treatments, therapeutic options, shared-decision making, potential procedures and referrals, and charting related to the visit: 36 minutes.  If applicable, time does not include time spent performing any procedure.        Again, thank you for allowing me to participate in the care of your patient.        Sincerely,        Leroy Peacock DO

## 2024-05-24 NOTE — PROGRESS NOTES
Andree Turjillo  :  1950  DOS: 2024  MRN: [id number]    Sports Medicine Clinic Visit    PCP: Yani Miller    Andree Trujillo is a 70 year old female who is seen in follow-up presenting with chronic left knee and right hip pain.    Interim History - 2023  Since last visit on 3/11/2022, patient has increased pain in the knee,more so than her hip. Last synvisc injection gave relief for around a year in the knee. Both knee and hip are starting to bother her again.  No interim injury.       Interim History - 2021  Since last visit on 2021 patient has moderate-severe left knee and right hip pain.  Left knee steroid injection completed on 2020 by Sin Navarrete PA-C provided good relief for ~ 3 months.  Right hip intra-articular steroid injection was last completed 21 with good relief for ~ 3 months.  Patient notes that hip and knee pain are worse with walking and going from sit to stand.  She is also noting generalized lower lumbar spine pain with walking and bending at the waist.  Currently treating with OTC pain medication with only mild relief.  No new injury in the interim.    Social History: retired    Interim History - 2022  Since last visit on 2021 patient has noticed an increase in medial left knee pain.  Left knee corticosteroid injection completed on 2021 provided 6 months of great relief and pain has returned over the past month and a half. She takes Advil. Moments of instability noted and denies swelling and tingling. She is interested in a repeat corticosteroid injection today.  No new injury in the interim.    Interim History - November 10, 2023  Since last visit on 2023 patient has recurrent pain in the left knee and right hip region.  Prior injections were helpful and interested in repeat options today, though also endorsing new posterior hip pain on the right.  No interim injury.       Interim History - May 24, 2024  Since last  visit on 11/10/23 patient has noted worsening right knee pain and mild swelling over the last 2-3 weeks while walking up steps. She notes that the right knee is more painful than the left knee today. Pain is worse with flexion and walking. She has not tried any treatment for the right knee.  11/10/23 left knee Synvisc injection and right hip joint steroid injection was performed. Right knee and left hip pain both returned about 1 month ago. She reports good relief with prior injections. No interim injury.       Review of Systems  Musculoskeletal: as above  Remainder of review of systems is negative including constitutional, CV, pulmonary, GI, Skin and Neurologic except as noted in HPI or medical history.    Past Medical History:   Diagnosis Date    Allergies     Anxiety     Cyst of breast     Essential hypertension     Osteoarthritis of spine with radiculopathy, lumbar region     Osteopenia     Right hip pain     Right knee pain     Spinal stenosis of lumbar region with neurogenic claudication     Tendinitis of both feet      Past Surgical History:   Procedure Laterality Date    HYSTERECTOMY TOTAL ABDOMINAL      11 years ago. Benign ovarian tumor. Everything removed.    HYSTERECTOMY, PAP NO LONGER INDICATED  2009    Total hysterectomy with bilateral oophorectomy due to Pelvic pain     Family History   Problem Relation Age of Onset    Breast Cancer Mother     Arthritis Mother     Hypertension Father     Alcohol/Drug Father     Arthritis Father     Cerebrovascular Disease Maternal Grandmother     Cancer Maternal Grandmother     Cancer Maternal Grandfather     Hypertension Paternal Grandfather     Hypertension Brother     Prostate Cancer Brother     Alcohol/Drug Brother     Allergies Brother     Diabetes Paternal Aunt     Uterine Cancer Sister     Coronary Artery Disease No family hx of        Objective  BP (!) 163/92   Pulse 67   Wt 83.6 kg (184 lb 4.8 oz)   BMI 31.15 kg/m      General: healthy, alert and in no  distress    HEENT: no scleral icterus or conjunctival erythema   Skin: no suspicious lesions or rash. No jaundice.   CV: regular rhythm by palpation, 2+ distal pulses, no pedal edema    Resp: normal respiratory effort without conversational dyspnea   Psych: normal mood and affect    Gait: mildly antalgic, appropriate coordination and balance   Neuro: normal light touch sensory exam of the extremities. Motor strength as noted below     Bilateral Knee exam    ROM:        Flexion 130 degrees       Extension -2 degrees       Range of motion limited by pain in terminal flexion > extension, R>L    Inspection:       no visible ecchymosis       effusion noted trace left, small/moderate right    Skin:       no visible deformities       well perfused       capillary refill brisk    Patellar Motion:        Crepitus noted in the patellofemoral joint    Tender:        medial joint line       lateral joint line    Non Tender:         remainder of knee area        along MCL        distal IT Band        infrapatellar tendon        tibial tubercle       pes anserine bursa    Special Tests:        neg (-) varus at 0 deg and 30 deg       neg (-) valgus at 0 deg and 30 deg    Right hip exam     Inspection:        no edema or ecchymosis in hip area     ROM:       Flexion 120       internal rotation 20      external rotation 40      Range of motion limited by pain with terminal adduction>flexion and IR     Strength:        flexion 5-/5       extension 5/5       abduction 5-/5       adduction 5-/5     Tender:        greater trochanter moderate       Anterior hip joint mild       Gluteal muscles, glute med and pirformis     Non Tender:        remainder of hip area       illiac crest       ASIS       pubis     Sensation:        grossly intact in hip and thigh     Skin:       well perfused       capillary refill brisk     Special Tests:        neg (-) NATI       weakly positive (+) FADIR       negative scour, neg Albert    Large Joint  Injection/Arthocentesis: L knee joint    Date/Time: 5/24/2024 2:14 PM    Performed by: Leroy Peacock DO  Authorized by: Leroy Peacock DO    Indications:  Pain and osteoarthritis  Needle Size:  21 G  Guidance: ultrasound    Approach:  Superolateral  Location:  Knee      Medications:  48 mg hylan 48 MG/6ML  Aspirate amount (mL):  3  Aspirate:  Serous and yellow  Outcome:  Tolerated well, no immediate complications  Procedure discussed: discussed risks, benefits, and alternatives    Consent Given by:  Patient  Timeout: timeout called immediately prior to procedure    Prep: patient was prepped and draped in usual sterile fashion     Ultrasound images of procedure were permanently stored.     Large Joint Injection/Arthocentesis: R knee joint    Date/Time: 5/24/2024 2:15 PM    Performed by: Leroy Peacock DO  Authorized by: Leroy Peacock DO    Indications:  Pain and osteoarthritis  Needle Size:  21 G  Guidance: ultrasound    Approach:  Superolateral  Location:  Knee      Medications:  40 mg triamcinolone 40 MG/ML; 3 mL ROPivacaine 5 MG/ML  Aspirate amount (mL):  10  Aspirate:  Serous and yellow  Outcome:  Tolerated well, no immediate complications  Procedure discussed: discussed risks, benefits, and alternatives    Consent Given by:  Patient  Timeout: timeout called immediately prior to procedure    Prep: patient was prepped and draped in usual sterile fashion     Ultrasound images of procedure were permanently stored.     Large Joint Injection/Arthocentesis: R greater trochanteric bursa    Date/Time: 5/24/2024 2:16 PM    Performed by: Leroy Peacock DO  Authorized by: Leroy Peacock DO    Indications:  Pain  Needle Size:  22 G  Guidance: landmark guided    Approach:  Lateral  Location:  Hip      Site:  R greater trochanteric bursa  Medications:  40 mg triamcinolone 40 MG/ML; 3 mL ROPivacaine 5 MG/ML  Outcome:  Tolerated well, no immediate  complications  Procedure discussed: discussed risks, benefits, and alternatives    Consent Given by:  Patient  Timeout: timeout called immediately prior to procedure    Prep: patient was prepped and draped in usual sterile fashion        Radiology  HIP RIGHT 2-3 VIEWS   12/2/2020 11:05 AM      HISTORY: Hip pain, right.     FINDINGS: Lower lumbar spine degenerative change.                                                                      IMPRESSION:   1. Mild femoral head osteophytosis consistent with early  osteoarthritis.  2. There is a acetabular pincer-type configuration. This can  predispose to femoroacetabular impingement, and clinical correlation  is recommended.    KNEE LEFT THREE VIEWS December 2, 2020 11:04 AM      HISTORY: Left knee pain, unspecified chronicity.                                                                      IMPRESSION:   1. Slight lateral patellar subluxation.  2. I cannot exclude one or two loose bodies in the notch region.  3. No fracture identified.  4. Small asymmetric dense area within the tibial proximal diaphysis.  This could represent a normal variant area of focal trabeculation, but  a small benign enchondroma or chronic bone infarct cannot be excluded.     Recent Results (from the past 744 hour(s))   PAIN Transforaminal SAI Inj Lumbosacral Dalton    Narrative    LUMBAR EPIDURAL STEROID INJECTION TRANSFORAMINAL APPROACH WITH   FLUOROSCOPIC GUIDANCE  Performed on: 5/15/24    Pre Procedure Diagnosis:  LBP, Lumbar radiculitis  Post Procedure Diagnosis:  Same  Procedure Performed:  Lumbar Transforaminal Epidural Steroid Injection   with Fluoroscopic Guidance  Clinical Scenario:  As per office notes  Anesthesia/Fluids:  As per intra-procedure documentation  Vital Signs:  As per intra-procedure documentation  Level Injected: L3-4  Side Injected: Bilateral  CC:    Andree Trujillo  is a 73 year-old female who presents today for a bilateral   L3-4 transforminal epidural steroid  injections as ordered by Liset Goyal.  The patient complains of lower extremity pain.  The patient's   MRI is reviewed.  The patient would like to proceed with the injection   today.     The patient denies any symptoms of an active infection and denies taking   antibiotics. The patient denies taking any prescription blood thinning   medications. The patient denies any allergies to iodine or iodine   contrast.      The patient has had other conservative treatment but wishes to pursue   spine injections.  The procedure of lumbar transforaminal epidural steroid   injection was discussed in detail, using a spine model to demonstrate.    The patient had the opportunity to directly ask the physician questions   regarding the procedure and the questions were answered prior to the   consent form being presented.  The risks of the procedure, including but   not limited to:  Back pain/soreness, infection, bleeding, permanent, nerve   damage/injury, epidural hematoma (with the need for surgical evaluation),   paralysis, allergic reaction,  worsening of back/leg pain or no change in   pain. The patient elected to proceed and signed informed consent.       The patient was placed in a prone position on the fluoroscopy table.  A   procedure pause was performed to verify the patient's identity, site, and   side of the procedure.    The lower back was prepped and draped in usual   fashion.  After anesthetizing the skin with 1% lidocaine, a 5 inch, 22   gauge needle was introduced at the rightL3 pedicle under fluoroscopic   guidance.  After aspiration was negative for blood or CSF, 1 mL of   Omnipaque-300 was injected on each side.  An epidural flow pattern without   vascular uptake was seen.   Subsequently, 1 mL of 1% lidocaine was   injected on the right side.  After waiting 2 minutes the patient sensation   and muscle strength was tested and found to have no substantial change in   strength or sensation.  Then 1 mL 10 mg  of dexamethasone was slowly   injected on the right side.  The exact same procedure was repeated on the   left side with with a test dose of 1 mL of 1% lidocaine, followed by a 1   mL 10 mg of dexamethasone was injected epidural flow pattern was seen.     PRE-PROCEDURE PAIN SCORE: 0/10  POST-PROCEDURE PAIN SCORE:  0/10     The patient tolerated the procedure well.  After a short period of   observation the patient was discharged under their own power.   The   patient was instructed to call the Spine Clinic if any questions/concerns   arise after the procedure.  Patient will follow-up with Liset Goyal   in 2 to 4 weeks.     XR Knee Standing Bilateral 3 Views    Narrative    EXAM: XR KNEE STANDING BILATERAL 3 VIEWS  DATE/TIME: 5/24/2024 1:28 PM    INDICATION: Bilateral knee pain; Bilateral knee pain  COMPARISON: 12/2/2020      Impression    IMPRESSION:     Right: Mild narrowing of the medial and moderate narrowing of the  patellofemoral compartments. No acute fracture. Small joint effusion.    Left: Advanced narrowing of the medial compartment with slight genu  varus. Moderate narrowing of the patellofemoral compartment. No acute  fracture. Trace joint effusion. Benign chondroid lesion within the  proximal tibial metadiaphysis, likely an enchondroma and unchanged.    SHIRA PARKINSON DO         SYSTEM ID:  [id number]       Assessment:  (M25.561,  M25.562) Acute pain of both knees  (primary encounter diagnosis)  Plan: XR Knee Standing Bilateral 3 Views  (M17.0) Primary osteoarthritis of both knees  (M76.02) Gluteal tendinitis of left buttock  (M16.11) Primary osteoarthritis of right hip      Plan:  Discussed the assessment with the patient.  Follow up: As needed based on clinical progress, plan for 4 weeks if relief is incomplete in hip or knee regions  Known left knee arthritis, likely compensatory component between the right hip and left knee, reviewed in detail again today  Medial compartment narrowing has  progressed on XR, reviewed today, also with milder DJD changes in the right knee which is more acutely painful  Right hip joint injection helped significantly at prior visit, not having the same groin pain as before, more posterior and lateral hip pain today, consistent with some measure of trochanteric bursitis and gluteal tendinitis  Reviewed option for alternative injection locations for the hip, can revisit if not significantly improved after 4 weeks  Repeat US guided left knee viscosupplementation injection today, reviewed goals  US guided right knee CSI with aspiration today  GTB corticosteroid injection performed today as well  Prior and new XR images independently visualized and reviewed with patient again today in clinic  Physical therapy options and low impact impact activity strategies to build core strength reviewed in detail today  Reviewed options for potential steroid vs viscosupplementation injections and the possibility for future orthopedic referral prn for the knee  Reviewed safe and appropriate OTC medication choices, try tylenol first  Up to 3000mg daily of tylenol is generally safe, NSAID dosing and duration limitations reviewed  Discussed nature of degenerative arthrosis of the knee and hip  Discussed symptom treatment with ice or heat, topical treatments, and rest if needed.   Expectations and limitations of synvisc were reviewed in detail  Often 4-6 weeks before full effect may be noticed  Usually covered up to every 6 months by insurance, but does not need to be repeated unless pain returns, at which point we would re-evaluate  Potential use of CSI in future for flares of pain reviewed in detail  Encouraged modified progressive pain-free activity as tolerated  Expectations and goals of CSI reviewed  Often 2-3 days for steroid effect, and can take up to two weeks for maximum effect  We discussed modified progressive pain-free activity as tolerated  Do not overuse in first two weeks if  feeling better due to concern for vulnerability while steroid is working  Supportive care reviewed  All questions were answered today  Contact us with additional questions or concerns  Signs and sx of concern reviewed      Leroy Peacock DO, MANI  Sports Medicine Physician  [facility]      Time spent in chart review, one-on-one evaluation, discussion with patient regarding: nature of problem, clinical course, prior treatments, therapeutic options, shared-decision making, potential procedures and referrals, and charting related to the visit: 36 minutes.  If applicable, time does not include time spent performing any procedure.

## 2024-06-20 NOTE — PROGRESS NOTES
ASSESSMENT & PLAN    Helen was seen today for pain, follow up, pain and follow up.    Diagnoses and all orders for this visit:    Primary osteoarthritis of left knee  -     Physical Therapy  Referral; Future  -     Large Joint Injection/Arthocentesis: L knee joint    Primary osteoarthritis of right knee  -     Physical Therapy  Referral; Future  -     (PRE-AUTH REQUEST) 48 mg hylan (SYNVISC ONE) injection 48 mg/6mL-ONCE      This issue is acute on chronic and Unchanged. Helen presents to our clinic today to discuss her acute on chronic left and right knee pain.  Her previous radiographs were reviewed and show severe, end-stage osteoarthritis of the medial compartment of the left knee as well as mild to moderate osteoarthritis of the medial and patellofemoral compartments of the right knee.  She received a hyaluronic acid injection in the left knee and a corticosteroid injection in the right knee approximately 1 month ago which has not provided much relief.  He has not participated in physical therapy.  We discussed the importance of PT for the long-term pain management and function of the knee.  In terms of her left knee, given her injection 1 month ago was a hyaluronic acid injection, we could perform a corticosteroid injection today and the patient wished to proceed with this.  In terms of the right knee, we would need to obtain prior authorization for a gel injection, the patient wished to proceed with this as well.  We determined the following plan:  - CSI to the left knee performed today under ultrasound guidance, see procedure note below for details  - Prior authorization for HA injection for the right knee placed today  - Physical therapy referral placed  - She will follow-up in 2 weeks for procedure only visit for an HA injection to the right knee      Sin Pope DO  [facility] SPORTS MEDICINE [facility]    SUBJECTIVE- Interim History June 20, 2024    Chief  Complaint   Patient presents with    Left Knee - Pain, Follow Up    Right Knee - Pain, Follow Up       Andree Trujillo is a 73 year old female who is seen in f/u up for    Primary osteoarthritis of left knee  Primary osteoarthritis of right knee. Since last visit on 5/24/24 with Dr. Peacock, patient has noticed both of her knees are still bothersome.  She is having trouble sleeping, walking with her pain.  She feels her pain is from her hips all the way down to her feet.  - Now ~ several years from initial onset    Worsened by: walking, driving  Better with: rest  Treatments tried: corticosteroid injection bilateral knee (most recent date: 5/24/24) that provided  no relief  Associated symptoms:  tingling and locking or catching    The patient is seen by themselves.  The patient is Right handed    Orthopedic/Surgical history: YES - Date: history of bilateral knee pain, has had several left knee cortisone injections.  Social History/Occupation: retired      REVIEW OF SYSTEMS:  Review of Systems    OBJECTIVE:  /66   Wt 83.9 kg (185 lb)   BMI 31.26 kg/m     General: healthy, alert and in no distress  Skin: no suspicious lesions or rash.  CV: distal perfusion intact   Resp: normal respiratory effort without conversational dyspnea   Psych: normal mood and affect  Gait: NORMAL  Neuro: Normal light sensory exam of LL extremity     left Knee exam  Gait: Normal  Alignment:  [x] Normal  [] Anatomic valgus  [] Anatomic varus  Inspection: [x] Normal  [] Ecchymosis present []Other   Palpation:  Joint Tenderness: [] No Tenderness  [x] MJL [] LJL [] MCL Margin [] LCL Margin [] Pes Anserine [] Distal Quadricep  [] Other   Peripatellar Tenderness: [x] None [] Lateral pole [] Medial pole [] Superior pole [] Inferior pole    Patellar tendon pain: [x] None [] Present    Patellar apprehension: [x] None [] Present    Patellar motion: [x] Normal [] Abnormal  Crepitus: [x] None [] Present  Effusion: [x] None [] Trace [] 1+ [] 2+ []  3+  Range of motion:  Flexion: 135, Extension: 0  Strength:  [x] Full in all planes, including intact extensor mechanism [] Limited as described  Neurologic: Normal sensation   Vascular: Normal pulses   Special tests:   Lachman: Negative  Anterior Drawer: Negative  Sag/quad Activation: NP  Posterior Drawer: Negative  Valgus Stress: Negative at 0/30  Varus Stress: Negative at 0/30  Pamela's: Negative  Thessaly: NP     Other notable findings/comments: None      RADIOLOGY:  Final results and radiologist's interpretation, available in the [facility] health record.  Images were reviewed with the patient in the office today.  My personal interpretation of the performed imaging: Radiographs from 5/24/2024 reviewed and show severe joint space narrowing osteophytosis of the left medial compartment of the knee with moderate joint space narrowing of the patellofemoral compartment.  There is also mild to moderate joint space narrowing osteophytosis of the right medial and patellofemoral compartments.    Large Joint Injection/Arthocentesis: L knee joint    Date/Time: 6/28/2024 2:52 PM    Performed by: Sin Pope DO  Authorized by: Sin Pope DO    Indications:  Pain and osteoarthritis  Needle Size:  25 G  Guidance: ultrasound    Approach:  Anterolateral  Location:  Knee      Medications:  5 mL lidocaine 1 %; 2 mL ROPivacaine 5 MG/ML; 6 mg betamethasone acet & sod phos 6 (3-3) MG/ML  Medications comment:  1ml of 8.4% Sodium Bicarbonate solution was used to buffer the local numbing agent for today's injection  Outcome:  Tolerated well, no immediate complications  Procedure discussed: discussed risks, benefits, and alternatives    Consent Given by:  Patient  Timeout: timeout called immediately prior to procedure    Prep: patient was prepped and draped in usual sterile fashion     Ultrasound was used to ensure safe and accurate needle placement and injection. Ultrasound images of the procedure were permanently  stored.

## 2024-06-28 ENCOUNTER — OFFICE VISIT (OUTPATIENT)
Dept: ORTHOPEDICS | Facility: CLINIC | Age: 74
End: 2024-06-28
Payer: COMMERCIAL

## 2024-06-28 VITALS — BODY MASS INDEX: 31.26 KG/M2 | WEIGHT: 185 LBS | SYSTOLIC BLOOD PRESSURE: 122 MMHG | DIASTOLIC BLOOD PRESSURE: 66 MMHG

## 2024-06-28 DIAGNOSIS — M17.12 PRIMARY OSTEOARTHRITIS OF LEFT KNEE: Primary | ICD-10-CM

## 2024-06-28 DIAGNOSIS — M17.11 PRIMARY OSTEOARTHRITIS OF RIGHT KNEE: ICD-10-CM

## 2024-06-28 PROCEDURE — 20611 DRAIN/INJ JOINT/BURSA W/US: CPT | Mod: LT | Performed by: STUDENT IN AN ORGANIZED HEALTH CARE EDUCATION/TRAINING PROGRAM

## 2024-06-28 PROCEDURE — 99213 OFFICE O/P EST LOW 20 MIN: CPT | Mod: 25 | Performed by: STUDENT IN AN ORGANIZED HEALTH CARE EDUCATION/TRAINING PROGRAM

## 2024-06-28 RX ORDER — ROPIVACAINE HYDROCHLORIDE 5 MG/ML
2 INJECTION, SOLUTION EPIDURAL; INFILTRATION; PERINEURAL
Status: SHIPPED | OUTPATIENT
Start: 2024-06-28

## 2024-06-28 RX ORDER — BETAMETHASONE SODIUM PHOSPHATE AND BETAMETHASONE ACETATE 3; 3 MG/ML; MG/ML
6 INJECTION, SUSPENSION INTRA-ARTICULAR; INTRALESIONAL; INTRAMUSCULAR; SOFT TISSUE
Status: SHIPPED | OUTPATIENT
Start: 2024-06-28

## 2024-06-28 RX ORDER — LIDOCAINE HYDROCHLORIDE 10 MG/ML
5 INJECTION, SOLUTION INFILTRATION; PERINEURAL
Status: SHIPPED | OUTPATIENT
Start: 2024-06-28

## 2024-06-28 RX ADMIN — BETAMETHASONE SODIUM PHOSPHATE AND BETAMETHASONE ACETATE 6 MG: 3; 3 INJECTION, SUSPENSION INTRA-ARTICULAR; INTRALESIONAL; INTRAMUSCULAR; SOFT TISSUE at 14:52

## 2024-06-28 RX ADMIN — LIDOCAINE HYDROCHLORIDE 5 ML: 10 INJECTION, SOLUTION INFILTRATION; PERINEURAL at 14:52

## 2024-06-28 RX ADMIN — ROPIVACAINE HYDROCHLORIDE 2 ML: 5 INJECTION, SOLUTION EPIDURAL; INFILTRATION; PERINEURAL at 14:52

## 2024-06-28 NOTE — LETTER
6/28/2024      Andree Trujillo  [address] [facility] [address]      Dear Colleague,    Thank you for referring your patient, Andree Trujillo, to the [facility]. Please see a copy of my visit note below.    ASSESSMENT & PLAN    Helen was seen today for pain, follow up, pain and follow up.    Diagnoses and all orders for this visit:    Primary osteoarthritis of left knee  -     Physical Therapy  Referral; Future  -     Large Joint Injection/Arthocentesis: L knee joint    Primary osteoarthritis of right knee  -     Physical Therapy  Referral; Future  -     (PRE-AUTH REQUEST) 48 mg hylan (SYNVISC ONE) injection 48 mg/6mL-ONCE      This issue is acute on chronic and Unchanged. Helen presents to our clinic today to discuss her acute on chronic left and right knee pain.  Her previous radiographs were reviewed and show severe, end-stage osteoarthritis of the medial compartment of the left knee as well as mild to moderate osteoarthritis of the medial and patellofemoral compartments of the right knee.  She received a hyaluronic acid injection in the left knee and a corticosteroid injection in the right knee approximately 1 month ago which has not provided much relief.  He has not participated in physical therapy.  We discussed the importance of PT for the long-term pain management and function of the knee.  In terms of her left knee, given her injection 1 month ago was a hyaluronic acid injection, we could perform a corticosteroid injection today and the patient wished to proceed with this.  In terms of the right knee, we would need to obtain prior authorization for a gel injection, the patient wished to proceed with this as well.  We determined the following plan:  - CSI to the left knee performed today under ultrasound guidance, see procedure note below for details  - Prior authorization for HA injection for the right knee placed today  - Physical  therapy referral placed  - She will follow-up in 2 weeks for procedure only visit for an HA injection to the right knee      Sin Pope DO  [facility]    SUBJECTIVE- Interim History June 20, 2024    Chief Complaint   Patient presents with     Left Knee - Pain, Follow Up     Right Knee - Pain, Follow Up       Andree Trujillo is a 73 year old female who is seen in f/u up for    Primary osteoarthritis of left knee  Primary osteoarthritis of right knee. Since last visit on 5/24/24 with Dr. Peacock, patient has noticed both of her knees are still bothersome.  She is having trouble sleeping, walking with her pain.  She feels her pain is from her hips all the way down to her feet.  - Now ~ several years from initial onset    Worsened by: walking, driving  Better with: rest  Treatments tried: corticosteroid injection bilateral knee (most recent date: 5/24/24) that provided  no relief  Associated symptoms:  tingling and locking or catching    The patient is seen by themselves.  The patient is Right handed    Orthopedic/Surgical history: YES - Date: history of bilateral knee pain, has had several left knee cortisone injections.  Social History/Occupation: retired      REVIEW OF SYSTEMS:  Review of Systems    OBJECTIVE:  /66   Wt 83.9 kg (185 lb)   BMI 31.26 kg/m     General: healthy, alert and in no distress  Skin: no suspicious lesions or rash.  CV: distal perfusion intact   Resp: normal respiratory effort without conversational dyspnea   Psych: normal mood and affect  Gait: NORMAL  Neuro: Normal light sensory exam of LL extremity     left Knee exam  Gait: Normal  Alignment:  [x] Normal  [] Anatomic valgus  [] Anatomic varus  Inspection: [x] Normal  [] Ecchymosis present []Other   Palpation:  Joint Tenderness: [] No Tenderness  [x] MJL [] LJL [] MCL Margin [] LCL Margin [] Pes Anserine [] Distal Quadricep  [] Other   Peripatellar Tenderness: [x] None [] Lateral pole []  Medial pole [] Superior pole [] Inferior pole    Patellar tendon pain: [x] None [] Present    Patellar apprehension: [x] None [] Present    Patellar motion: [x] Normal [] Abnormal  Crepitus: [x] None [] Present  Effusion: [x] None [] Trace [] 1+ [] 2+ [] 3+  Range of motion:  Flexion: 135, Extension: 0  Strength:  [x] Full in all planes, including intact extensor mechanism [] Limited as described  Neurologic: Normal sensation   Vascular: Normal pulses   Special tests:   Lachman: Negative  Anterior Drawer: Negative  Sag/quad Activation: NP  Posterior Drawer: Negative  Valgus Stress: Negative at 0/30  Varus Stress: Negative at 0/30  Pamela's: Negative  Thessaly: NP     Other notable findings/comments: None      RADIOLOGY:  Final results and radiologist's interpretation, available in the [facility] health record.  Images were reviewed with the patient in the office today.  My personal interpretation of the performed imaging: Radiographs from 5/24/2024 reviewed and show severe joint space narrowing osteophytosis of the left medial compartment of the knee with moderate joint space narrowing of the patellofemoral compartment.  There is also mild to moderate joint space narrowing osteophytosis of the right medial and patellofemoral compartments.    Large Joint Injection/Arthocentesis: L knee joint    Date/Time: 6/28/2024 2:52 PM    Performed by: Sin Pope DO  Authorized by: Sin Pope DO    Indications:  Pain and osteoarthritis  Needle Size:  25 G  Guidance: ultrasound    Approach:  Anterolateral  Location:  Knee      Medications:  5 mL lidocaine 1 %; 2 mL ROPivacaine 5 MG/ML; 6 mg betamethasone acet & sod phos 6 (3-3) MG/ML  Medications comment:  1ml of 8.4% Sodium Bicarbonate solution was used to buffer the local numbing agent for today's injection  Outcome:  Tolerated well, no immediate complications  Procedure discussed: discussed risks, benefits, and alternatives    Consent Given by:  Patient  Timeout:  timeout called immediately prior to procedure    Prep: patient was prepped and draped in usual sterile fashion     Ultrasound was used to ensure safe and accurate needle placement and injection. Ultrasound images of the procedure were permanently stored.                 Again, thank you for allowing me to participate in the care of your patient.        Sincerely,        Sin Pope, DO

## 2024-07-03 ENCOUNTER — TELEPHONE (OUTPATIENT)
Dept: FAMILY MEDICINE | Facility: CLINIC | Age: 74
End: 2024-07-03
Payer: COMMERCIAL
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
## 2024-07-09 NOTE — PROGRESS NOTES
Sports Medicine Procedure Note    Helen was seen today for pain.    Diagnoses and all orders for this visit:    Primary osteoarthritis of right knee  -     Large Joint Injection/Arthocentesis: R knee joint        Andree Trujillo is a/an 73 year old female who is seen for Synvisc injection of R knee.   Last injection: CSI to R knee on 5/24/24. CSI to L knee on 6/28/24    -Aspiration and HA injection to the R knee performed today under USG, see procedure note below for details  -Post-injection instructions were provided to the patient    Follow up as needed.       Post-Injection Discharge Instructions    You may shower, however avoid swimming, tub baths or hot tubs for 24 hours following your procedure  You may have a mild to moderate increase in pain for a few days following the injection.  The lidocaine (local numbing medicine) will wear off in several hours. It usually takes 3-5 days for the steroid medication to start working although it may take up to 14 days for full effect.   You may use ice packs for 10-15 minutes, 3 to 4 times a day at the injection site for comfort if needed  You may use extra strength Tylenol for pain control if necessary   If you were fasting, you may resume your normal diet and medications after the procedure  If you have diabetes, your blood sugar may be higher than normal for 10-14 days following a steroid injection. Contact your doctor who manages your diabetes if your blood sugar is significantly higher than usual    If you experience any of the following, call Sports Medicine @ [phone] or [phone]  -Fever over 100 degrees F  -Swelling, bleeding, redness, drainage, warmth at the injection site  -New or significant worsening pain        Dr. BLILY Pope DO CAQ  [facility]  Sports Medicine     -----  Large Joint Injection/Arthocentesis: R knee joint    Date/Time: 7/20/2024 12:07 PM    Performed by: Sin Pope DO  Authorized by: Sin Pope DO     Needle Size:  22 G  Guidance: ultrasound    Approach:  Anterolateral  Location:  Knee      Medications:  48 mg hylan 48 MG/6ML; 3 mL lidocaine 1 %  Medications comment:  1ml of 8.4% Sodium Bicarbonate solution was used to buffer the local numbing agent for today's injection    Aspirate amount (mL):  12  Aspirate:  Yellow and clear  Outcome:  Tolerated well, no immediate complications  Procedure discussed: discussed risks, benefits, and alternatives    Consent Given by:  Patient  Timeout: timeout called immediately prior to procedure    Prep: patient was prepped and draped in usual sterile fashion     Ultrasound was used to ensure proper needle placement and ensure patient safety. Permanent images are stored in the patient's record.

## 2024-07-12 DIAGNOSIS — I10 HYPERTENSION GOAL BP (BLOOD PRESSURE) < 140/90: ICD-10-CM

## 2024-07-12 RX ORDER — METOPROLOL SUCCINATE 100 MG/1
100 TABLET, EXTENDED RELEASE ORAL DAILY
Qty: 90 TABLET | Refills: 1 | Status: SHIPPED | OUTPATIENT
Start: 2024-07-12

## 2024-07-12 NOTE — TELEPHONE ENCOUNTER
Pending Prescriptions:                       Disp   Refills    metoprolol succinate ER (TOPROL XL) 100 M*90 tab*3            Sig: Take 1 tablet (100 mg) by mouth daily    Pharmacy: [id number] - [facility] [id number] [facility]

## 2024-07-20 ENCOUNTER — OFFICE VISIT (OUTPATIENT)
Dept: ORTHOPEDICS | Facility: CLINIC | Age: 74
End: 2024-07-20
Payer: COMMERCIAL

## 2024-07-20 DIAGNOSIS — M17.11 PRIMARY OSTEOARTHRITIS OF RIGHT KNEE: Primary | ICD-10-CM

## 2024-07-20 PROCEDURE — 20611 DRAIN/INJ JOINT/BURSA W/US: CPT | Mod: RT | Performed by: STUDENT IN AN ORGANIZED HEALTH CARE EDUCATION/TRAINING PROGRAM

## 2024-07-20 RX ORDER — LIDOCAINE HYDROCHLORIDE 10 MG/ML
3 INJECTION, SOLUTION INFILTRATION; PERINEURAL
Status: SHIPPED | OUTPATIENT
Start: 2024-07-20

## 2024-07-20 RX ADMIN — LIDOCAINE HYDROCHLORIDE 3 ML: 10 INJECTION, SOLUTION INFILTRATION; PERINEURAL at 12:07

## 2024-07-20 NOTE — LETTER
7/20/2024      Andree Trujillo  [address] [facility] [address]      Dear Colleague,    Thank you for referring your patient, Andree Trujillo, to the [facility]. Please see a copy of my visit note below.    Sports Medicine Procedure Note    Helen was seen today for pain.    Diagnoses and all orders for this visit:    Primary osteoarthritis of right knee  -     Large Joint Injection/Arthocentesis: R knee joint        Andree Trujillo is a/an 73 year old female who is seen for Synvisc injection of R knee.   Last injection: CSI to R knee on 5/24/24. CSI to L knee on 6/28/24    -Aspiration and HA injection to the R knee performed today under USG, see procedure note below for details  -Post-injection instructions were provided to the patient    Follow up as needed.       Post-Injection Discharge Instructions    You may shower, however avoid swimming, tub baths or hot tubs for 24 hours following your procedure  You may have a mild to moderate increase in pain for a few days following the injection.  The lidocaine (local numbing medicine) will wear off in several hours. It usually takes 3-5 days for the steroid medication to start working although it may take up to 14 days for full effect.   You may use ice packs for 10-15 minutes, 3 to 4 times a day at the injection site for comfort if needed  You may use extra strength Tylenol for pain control if necessary   If you were fasting, you may resume your normal diet and medications after the procedure  If you have diabetes, your blood sugar may be higher than normal for 10-14 days following a steroid injection. Contact your doctor who manages your diabetes if your blood sugar is significantly higher than usual    If you experience any of the following, call Sports Medicine @ [phone] or [phone]  -Fever over 100 degrees F  -Swelling, bleeding, redness, drainage, warmth at the injection site  -New or significant  worsening pain        Dr. BILLY Pope DO CAQ  [facility]  Sports Medicine     -----  Large Joint Injection/Arthocentesis: R knee joint    Date/Time: 7/20/2024 12:07 PM    Performed by: Sin Pope DO  Authorized by: Sin Pope DO    Needle Size:  22 G  Guidance: ultrasound    Approach:  Anterolateral  Location:  Knee      Medications:  48 mg hylan 48 MG/6ML; 3 mL lidocaine 1 %  Medications comment:  1ml of 8.4% Sodium Bicarbonate solution was used to buffer the local numbing agent for today's injection    Aspirate amount (mL):  12  Aspirate:  Yellow and clear  Outcome:  Tolerated well, no immediate complications  Procedure discussed: discussed risks, benefits, and alternatives    Consent Given by:  Patient  Timeout: timeout called immediately prior to procedure    Prep: patient was prepped and draped in usual sterile fashion     Ultrasound was used to ensure proper needle placement and ensure patient safety. Permanent images are stored in the patient's record.           Again, thank you for allowing me to participate in the care of your patient.        Sincerely,        Sin Pope DO

## 2024-07-22 NOTE — TELEPHONE ENCOUNTER
LMTCB. Completed and placed behind  at [facility] for patient to . If patient calls back, please notify patient.

## 2024-07-24 NOTE — PROGRESS NOTES
ASSESSMENT & PLAN    Helen was seen today for pain.    Diagnoses and all orders for this visit:    Primary osteoarthritis of right hip  -     Cancel: XR Pelvis w Hip Right G/E 2 Views; Future  -     Large Joint Injection/Arthocentesis: R hip joint      This issue is chronic and Worsening. Helen presents to our clinic today to discuss her chronic right hip pain.  Her history, exam findings, and imaging findings are consistent with her radiographically moderate to severe hip osteoarthritis as the main  of her symptoms.  She has had corticosteroid injections for her hip in the past, last intra-articular injection being in 2023 which provided good relief.  She did have an injection to the greater trochanteric area approximately 1 month ago that did not change her symptoms much.  She has not participated in physical therapy.  We discussed the role of corticosteroid injections for pain relief and to allow the patient to better participate in PT, we discussed the benefits of PT to help strengthen the muscles around the joint and support the hip and maximize function.  The patient does state that she is considering getting either a knee or a hip replacement, but currently cannot do so due to commitments to helping watch her family's young children.  We determined the following plan:  - CSI to the right hip performed today under ultrasound guidance, see procedure note below for details  - Physical therapy for her hip was placed today  - She can continue to use over-the-counter pain medicines as needed  - She will follow-up in our clinic as needed      Sin Pope DO  [facility]    -----  Chief Complaint   Patient presents with    Right Hip - Pain       SUBJECTIVE  Andree Trujillo is a/an 73 year old female who is seen as a self referral for evaluation of right hip.     The patient is seen by themselves.  The patient is Right handed    Onset: 1 month(s) ago. Reports  "insidious onset without acute precipitating event.  Location of Pain: right she is getting pain in the buttocks area   Worsened by: laying on right side bringing leg across body   Better with: injection,   Treatments tried: corticosteroid injection (most recent date: 5/24/24) that provided  1 month(s) of relief. Unsure if she received injection on that day though   Associated symptoms: no distal numbness or tingling; denies swelling or warmth    Orthopedic/Surgical history: yes, history of injectio in the right hip   Social History/Occupation: retired       REVIEW OF SYSTEMS:  Review of systems negative unless mentioned in HPI     OBJECTIVE:  Ht 1.638 m (5' 4.5\")   Wt 83.9 kg (185 lb)   BMI 31.26 kg/m     General: healthy, alert and in no distress  Skin: no suspicious lesions or rash.  CV: distal perfusion intact   Resp: normal respiratory effort without conversational dyspnea   Psych: normal mood and affect  Gait: NORMAL  Neuro: Normal light sensory exam of RL extremity     Hip Exam:    Musculoskeletal Exam   Gait Normal     Right Left   Inspection Grossly Normal  Grossly Normal    Palpation     Tenderness over  None None   Range of Motion     Flexion - Supine  Full to about 90  Full to about 90   ER at 90 of flexion 40 55   IR at 90 of flexion 20 40   Strength 5/5 Flexion  5/5 Abduction in Neutral  5/5 Abduction in Extension    Grossly Nml otherwise  5/5 Flexion  5/5 Abduction in Neutral  5/5 Abduction in Extension    Grossly Nml otherwise    Pain Provocation Tests     FADIR POS NEG   NATI POS  NEG    Instability/log roll NEG  NEG    Trochanteric Pain Sign NEG NEG    Straight leg raise (passive) NEG  NEG    Posterior/Ischiofemoral Impingement Provocation NEG  NEG    Neurologic Intact sensation          RADIOLOGY:  Final results and radiologist's interpretation, available in the [facility] health record.  Images were reviewed with the patient in the office today.  My personal interpretation of the performed imaging: " Moderate to severe joint space narrowing and osteophytosis of the right hip.  No acute fractures or bony abnormality seen.       Large Joint Injection/Arthocentesis: R hip joint    Date/Time: 7/26/2024 1:25 PM    Performed by: Sin Pope DO  Authorized by: Sin Pope DO    Needle Size:  22 G  Guidance: ultrasound    Approach:  Anterior  Location:  Hip      Site:  R hip joint  Medications:  6 mg betamethasone acet & sod phos 6 (3-3) MG/ML; 5 mL lidocaine 1 %; 2 mL ROPivacaine 5 MG/ML  Medications comment:  1ml of 8.4% Sodium Bicarbonate solution was used to buffer the local numbing agent for today's injection    Outcome:  Tolerated well, no immediate complications  Procedure discussed: discussed risks, benefits, and alternatives    Consent Given by:  Patient  Timeout: timeout called immediately prior to procedure    Prep: patient was prepped and draped in usual sterile fashion     Ultrasound guidance was used to ensure proper needle placement and patient safety. Permanent images were stored in the patient's record.

## 2024-07-26 ENCOUNTER — ANCILLARY PROCEDURE (OUTPATIENT)
Dept: GENERAL RADIOLOGY | Facility: CLINIC | Age: 74
End: 2024-07-26
Attending: STUDENT IN AN ORGANIZED HEALTH CARE EDUCATION/TRAINING PROGRAM
Payer: COMMERCIAL

## 2024-07-26 ENCOUNTER — OFFICE VISIT (OUTPATIENT)
Dept: ORTHOPEDICS | Facility: CLINIC | Age: 74
End: 2024-07-26
Payer: COMMERCIAL

## 2024-07-26 ENCOUNTER — ANCILLARY ORDERS (OUTPATIENT)
Dept: ORTHOPEDICS | Facility: CLINIC | Age: 74
End: 2024-07-26

## 2024-07-26 VITALS — BODY MASS INDEX: 30.82 KG/M2 | WEIGHT: 185 LBS | HEIGHT: 65 IN

## 2024-07-26 DIAGNOSIS — M16.11 PRIMARY OSTEOARTHRITIS OF RIGHT HIP: Primary | ICD-10-CM

## 2024-07-26 DIAGNOSIS — M16.11 PRIMARY OSTEOARTHRITIS OF RIGHT HIP: ICD-10-CM

## 2024-07-26 PROCEDURE — 20611 DRAIN/INJ JOINT/BURSA W/US: CPT | Mod: RT | Performed by: STUDENT IN AN ORGANIZED HEALTH CARE EDUCATION/TRAINING PROGRAM

## 2024-07-26 PROCEDURE — 99213 OFFICE O/P EST LOW 20 MIN: CPT | Mod: 25 | Performed by: STUDENT IN AN ORGANIZED HEALTH CARE EDUCATION/TRAINING PROGRAM

## 2024-07-26 PROCEDURE — 73502 X-RAY EXAM HIP UNI 2-3 VIEWS: CPT | Mod: TC | Performed by: RADIOLOGY

## 2024-07-26 RX ORDER — LIDOCAINE HYDROCHLORIDE 10 MG/ML
5 INJECTION, SOLUTION INFILTRATION; PERINEURAL
Status: SHIPPED | OUTPATIENT
Start: 2024-07-26

## 2024-07-26 RX ORDER — ROPIVACAINE HYDROCHLORIDE 5 MG/ML
2 INJECTION, SOLUTION EPIDURAL; INFILTRATION; PERINEURAL
Status: SHIPPED | OUTPATIENT
Start: 2024-07-26

## 2024-07-26 RX ORDER — BETAMETHASONE SODIUM PHOSPHATE AND BETAMETHASONE ACETATE 3; 3 MG/ML; MG/ML
6 INJECTION, SUSPENSION INTRA-ARTICULAR; INTRALESIONAL; INTRAMUSCULAR; SOFT TISSUE
Status: SHIPPED | OUTPATIENT
Start: 2024-07-26

## 2024-07-26 RX ADMIN — BETAMETHASONE SODIUM PHOSPHATE AND BETAMETHASONE ACETATE 6 MG: 3; 3 INJECTION, SUSPENSION INTRA-ARTICULAR; INTRALESIONAL; INTRAMUSCULAR; SOFT TISSUE at 13:25

## 2024-07-26 RX ADMIN — LIDOCAINE HYDROCHLORIDE 5 ML: 10 INJECTION, SOLUTION INFILTRATION; PERINEURAL at 13:25

## 2024-07-26 RX ADMIN — ROPIVACAINE HYDROCHLORIDE 2 ML: 5 INJECTION, SOLUTION EPIDURAL; INFILTRATION; PERINEURAL at 13:25

## 2024-07-26 NOTE — LETTER
7/26/2024      Andree Trujillo  [address] [facility] [address]      Dear Colleague,    Thank you for referring your patient, Andree Trujillo, to the [facility] Please see a copy of my visit note below.    ASSESSMENT & PLAN    Helen was seen today for pain.    Diagnoses and all orders for this visit:    Primary osteoarthritis of right hip  -     Cancel: XR Pelvis w Hip Right G/E 2 Views; Future  -     Large Joint Injection/Arthocentesis: R hip joint      This issue is chronic and Worsening. Helen presents to our clinic today to discuss her chronic right hip pain.  Her history, exam findings, and imaging findings are consistent with her radiographically moderate to severe hip osteoarthritis as the main  of her symptoms.  She has had corticosteroid injections for her hip in the past, last intra-articular injection being in 2023 which provided good relief.  She did have an injection to the greater trochanteric area approximately 1 month ago that did not change her symptoms much.  She has not participated in physical therapy.  We discussed the role of corticosteroid injections for pain relief and to allow the patient to better participate in PT, we discussed the benefits of PT to help strengthen the muscles around the joint and support the hip and maximize function.  The patient does state that she is considering getting either a knee or a hip replacement, but currently cannot do so due to commitments to helping watch her family's young children.  We determined the following plan:  - CSI to the right hip performed today under ultrasound guidance, see procedure note below for details  - Physical therapy for her hip was placed today  - She can continue to use over-the-counter pain medicines as needed  - She will follow-up in our clinic as needed      Sin Pope, DO  [facility] SP[facility]TS [facility]    -----  Chief Complaint  "  Patient presents with     Right Hip - Pain       SUBJECTIVE  Andree Trujillo is a/an 73 year old female who is seen as a self referral for evaluation of right hip.     The patient is seen by themselves.  The patient is Right handed    Onset: 1 month(s) ago. Reports insidious onset without acute precipitating event.  Location of Pain: right she is getting pain in the buttocks area   Worsened by: laying on right side bringing leg across body   Better with: injection,   Treatments tried: corticosteroid injection (most recent date: 5/24/24) that provided  1 month(s) of relief. Unsure if she received injection on that day though   Associated symptoms: no distal numbness or tingling; denies swelling or warmth    Orthopedic/Surgical history: yes, history of injectio in the right hip   Social History/Occupation: retired       REVIEW OF SYSTEMS:  Review of systems negative unless mentioned in HPI     OBJECTIVE:  Ht 1.638 m (5' 4.5\")   Wt 83.9 kg (185 lb)   BMI 31.26 kg/m     General: healthy, alert and in no distress  Skin: no suspicious lesions or rash.  CV: distal perfusion intact   Resp: normal respiratory effort without conversational dyspnea   Psych: normal mood and affect  Gait: NORMAL  Neuro: Normal light sensory exam of RL extremity     Hip Exam:    Musculoskeletal Exam   Gait Normal     Right Left   Inspection Grossly Normal  Grossly Normal    Palpation     Tenderness over  None None   Range of Motion     Flexion - Supine  Full to about 90  Full to about 90   ER at 90 of flexion 40 55   IR at 90 of flexion 20 40   Strength 5/5 Flexion  5/5 Abduction in Neutral  5/5 Abduction in Extension    Grossly Nml otherwise  5/5 Flexion  5/5 Abduction in Neutral  5/5 Abduction in Extension    Grossly Nml otherwise    Pain Provocation Tests     FADIR POS NEG   NATI POS  NEG    Instability/log roll NEG  NEG    Trochanteric Pain Sign NEG NEG    Straight leg raise (passive) NEG  NEG    Posterior/Ischiofemoral Impingement " Provocation NEG  NEG    Neurologic Intact sensation          RADIOLOGY:  Final results and radiologist's interpretation, available in the [facility] health record.  Images were reviewed with the patient in the office today.  My personal interpretation of the performed imaging: Moderate to severe joint space narrowing and osteophytosis of the right hip.  No acute fractures or bony abnormality seen.       Large Joint Injection/Arthocentesis: R hip joint    Date/Time: 7/26/2024 1:25 PM    Performed by: Sin Pope DO  Authorized by: Sin Pope DO    Needle Size:  22 G  Guidance: ultrasound    Approach:  Anterior  Location:  Hip      Site:  R hip joint  Medications:  6 mg betamethasone acet & sod phos 6 (3-3) MG/ML; 5 mL lidocaine 1 %; 2 mL ROPivacaine 5 MG/ML  Medications comment:  1ml of 8.4% Sodium Bicarbonate solution was used to buffer the local numbing agent for today's injection    Outcome:  Tolerated well, no immediate complications  Procedure discussed: discussed risks, benefits, and alternatives    Consent Given by:  Patient  Timeout: timeout called immediately prior to procedure    Prep: patient was prepped and draped in usual sterile fashion     Ultrasound guidance was used to ensure proper needle placement and patient safety. Permanent images were stored in the patient's record.         Again, thank you for allowing me to participate in the care of your patient.        Sincerely,        Sin Pope DO

## 2024-08-28 NOTE — PROGRESS NOTES
PHYSICAL THERAPY EVALUATION  Type of Visit: Evaluation       Fall Risk Screen:  Fall screen completed by: PT  Have you fallen 2 or more times in the past year?: No  Have you fallen and had an injury in the past year?: No  Is patient a fall risk?: No    Subjective     Pt is a 73 year old female presenting with complaints of low back, R hip/glute, and B knee pain.   The symptoms started many years ago. Recently she has been getting full body aches. Today is a better day.   Prior treatments: injections - joint 2023 with benefit but trochanteric bursa 1 month ago without change in symptoms  Pain is worse with walking, getting out of chair (benjie lower ones), standing, stairs.   Pain is better with sitting  Sleep Quality: poor. Troubles getting comfortable at night. She won't usually wake due to the pain.  Current level of activity: nothing. She used to walk more but not since the pain got worse.   Red Flags: none  Goals of therapy: stairs, getting up, walking     XR HIP  IMPRESSION: Moderate to severe right hip arthrosis. No evidence of an acute fracture or dislocation. Arthritic changes lower lumbar spine.     XR knees   IMPRESSION:   Right: Mild narrowing of the medial and moderate narrowing of the  patellofemoral compartments. No acute fracture. Small joint effusion.  Left: Advanced narrowing of the medial compartment with slight genu  varus. Moderate narrowing of the patellofemoral compartment. No acute  fracture. Trace joint effusion. Benign chondroid lesion within the  proximal tibial metadiaphysis, likely an enchondroma and unchanged.        Presenting condition or subjective complaint: artritis  Date of onset: 08/29/24 (chronic - no specific onset date)    Relevant medical history: Arthritis; Dizziness; High blood pressure; Overweight; Pain at night or rest; Vision problems   Dates & types of surgery: historectomy  2009    Prior diagnostic imaging/testing results: X-ray     Prior therapy history for the same  diagnosis, illness or injury: No      Prior Level of Function  Transfers:   Ambulation:   ADL:   IADL:     Living Environment  Social support: Alone   Type of home: [facility]; 1 level   Stairs to enter the home: No       Ramp: No   Stairs inside the home: No       Help at home: None  Equipment owned:       Employment:      Hobbies/Interests:      Patient goals for therapy: get up out of chair  kneel  stairs  walk    Pain assessment:      Objective       LUMBAR SPINE/HIP/KNEE EVALUATION    PAIN: Pain Level at Rest: 0/10  Pain Level with Use: 3/10 + weakness  Pain Location: lumbar spine, hip, knee, ankle, and foot   Pain Quality: Aching, Dull, throbbing, and intermittent electric feeling in leg=s  Pain Frequency: intermittent now but it was constant a few weeks ago     POSTURE: poor    GAIT:   Weightbearing Status: WBAT  Assistive Device(s): None  Gait Deviations: Antalgic      AROM Min, mod, max loss   Lumbar Flexion WFL   Lumbar Extension WFL   Lumbar Side Bend  Min B pain    Torso Rotation (seated)    Knee Flexion  R: 120, L: 125   Knee Extension    Hip Flexion  R: 112   Hip IR R: tight to neutral, L: a little better but only a few deg past neutral    Hip ER Tighter on R         LE Flexibility (Supine) ROM    Trunk Rotation Pain in LB with  full motion   Hamstring 90-90    Piriformis    Single/Double Knee to Chest        MYOTOMES:    Right Left   T12-L3 (Hip Flexion) 3+ 3+   L2-4 (Quads)  4 pain 4+ pain   L4 (Ankle DF) 4 4   L5 (Great Toe Ext)     S1 (Toe Raise and Knee Flexion) 4 4       Functional Core/Gluteal Strength:   Bridging: no pain   Squat: unable --> STS with challenge with use of hands and significant forward lean   SLS: unable to stay balances  SLR: mild extensor lag B      LUMBAR/HIP Special Tests:    Right  Left   NATI - -   FADIR/Labrum/ROBERT Pain in knee -       Assessment & Plan   CLINICAL IMPRESSIONS  Medical Diagnosis: Primary osteoarthritis of right hip    Treatment Diagnosis: widespread low  back, R hip, and B knee pain   Impression/Assessment: Patient is a 73 year old female with widespread LB, hip and knee complaints.  The following significant findings have been identified: Pain, Decreased ROM/flexibility, Decreased joint mobility, Decreased strength, Impaired balance, Impaired gait, Impaired muscle performance, Decreased activity tolerance, Impaired posture, and Instability. These impairments interfere with their ability to perform self care tasks, work tasks, recreational activities, household chores, driving , household mobility, and community mobility as compared to previous level of function.     Clinical Decision Making (Complexity):  Clinical Presentation: Stable/Uncomplicated  Clinical Presentation Rationale: based on medical and personal factors listed in PT evaluation  Clinical Decision Making (Complexity): Low complexity    PLAN OF CARE  Treatment Interventions:  Interventions: Gait Training, Manual Therapy, Neuromuscular Re-education, Therapeutic Activity, Therapeutic Exercise, Self-Care/Home Management    Long Term Goals     PT Goal 1  Goal Identifier: Walking  Goal Description: Pt will be able to walk for >30 min without increase in symptoms <2/10 NRS  Rationale: to maximize safety and independence with performance of ADLs and functional tasks;to maximize safety and independence within the home;to maximize safety and independence within the community;to maximize safety and independence with transportation;to maximize safety and independence with self cares  Target Date: 11/21/24  PT Goal 2  Goal Identifier: Stairs  Goal Description: Pt will be able to complete stairs at her niece's house without pain or too much challenge in order to help with childcare and funcational mobility  Target Date: 11/21/24  PT Goal 3  Goal Identifier: getitng up  Goal Description: Pt will be able to get up from a chair without pain or challenge in order to improve functional mobility  Target Date:  11/21/24      Frequency of Treatment: 1x/week  Duration of Treatment: 12 weeks    Recommended Referrals to Other Professionals:   Education Assessment:   Learner/Method: Patient  Education Comments: Eager to participate in therapy    Risks and benefits of evaluation/treatment have been explained.   Patient/Family/caregiver agrees with Plan of Care.     Evaluation Time:     PT Eval, Low Complexity Minutes (25828): 20       Signing Clinician: Dian Muir PT        [facility]                                                                                   OUTPATIENT PHYSICAL THERAPY      PLAN OF TREATMENT FOR OUTPATIENT REHABILITATION   Patient's Last Name, First Name, BI TrujilloAndree  NELIA YOB: 1950   Provider's Name   [facility]   Medical Record No.  [id number]     Onset Date: 08/29/24 (chronic - no specific onset date)  Start of Care Date: 08/29/24     Medical Diagnosis:  Primary osteoarthritis of right hip      PT Treatment Diagnosis:  widespread low back, R hip, and B knee pain Plan of Treatment  Frequency/Duration: 1x/week/ 12 weeks    Certification date from 08/29/24 to 11/21/24         See note for plan of treatment details and functional goals     Dian Muir, PT                         I CERTIFY THE NEED FOR THESE SERVICES FURNISHED UNDER        THIS PLAN OF TREATMENT AND WHILE UNDER MY CARE     (Physician attestation of this document indicates review and certification of the therapy plan).              Referring Provider:  Sin Pope    Initial Assessment  See Epic Evaluation- Start of Care Date: 08/29/24

## 2024-08-29 ENCOUNTER — THERAPY VISIT (OUTPATIENT)
Dept: PHYSICAL THERAPY | Facility: REHABILITATION | Age: 74
End: 2024-08-29
Attending: STUDENT IN AN ORGANIZED HEALTH CARE EDUCATION/TRAINING PROGRAM
Payer: COMMERCIAL

## 2024-08-29 DIAGNOSIS — M16.11 PRIMARY OSTEOARTHRITIS OF RIGHT HIP: ICD-10-CM

## 2024-08-29 PROCEDURE — 97110 THERAPEUTIC EXERCISES: CPT | Mod: GP | Performed by: PHYSICAL THERAPIST

## 2024-08-29 PROCEDURE — 97161 PT EVAL LOW COMPLEX 20 MIN: CPT | Mod: GP | Performed by: PHYSICAL THERAPIST

## 2024-08-29 ASSESSMENT — ACTIVITIES OF DAILY LIVING (ADL)
HOW_WOULD_YOU_RATE_YOUR_CURRENT_LEVEL_OF_FUNCTION_DURING_YOUR_USUAL_ACTIVITIES_OF_DAILY_LIVING_FROM_0_TO_100_WITH_100_BEING_YOUR_LEVEL_OF_FUNCTION_PRIOR_TO_YOUR_HIP_PROBLEM_AND_0_BEING_THE_INABILITY_TO_PERFORM_ANY_OF_YOUR_USUAL_DAILY_ACTIVITIES?: 50
PUTTING ON SOCKS AND SHOES: SLIGHT DIFFICULTY
HOS_ADL_HIGHEST_POTENTIAL_SCORE: 68
TWISTING/PIVOTING ON INVOLVED LEG: MODERATE DIFFICULTY
GIVING WAY, BUCKLING OR SHIFTING OF KNEE: THE SYMPTOM AFFECTS MY ACTIVITY MODERATELY
HOW_WOULD_YOU_RATE_YOUR_CURRENT_LEVEL_OF_FUNCTION?: ABNORMAL
LIMPING: THE SYMPTOM AFFECTS MY ACTIVITY SLIGHTLY
HOW_WOULD_YOU_RATE_THE_OVERALL_FUNCTION_OF_YOUR_KNEE_DURING_YOUR_USUAL_DAILY_ACTIVITIES?: ABNORMAL
WALKING_DOWN_STEEP_HILLS: EXTREME DIFFICULTY
HEAVY_WORK: EXTREME DIFFICULTY
RAW_SCORE: 35
KNEEL ON THE FRONT OF YOUR KNEE: I AM UNABLE TO DO THE ACTIVITY
SPORTS_COUNT: 9
WALKING_APPROXIMATELY_10_MINUTES: MODERATE DIFFICULTY
STANDING FOR 15 MINUTES: EXTREME DIFFICULTY
SIT WITH YOUR KNEE BENT: ACTIVITY IS NOT DIFFICULT
HOW_WOULD_YOU_RATE_YOUR_CURRENT_LEVEL_OF_FUNCTION_DURING_YOUR_SPORTS_RELATED_ACTIVITIES_FROM_0_TO_100_WITH_100_BEING_YOUR_LEVEL_OF_FUNCTION_PRIOR_TO_YOUR_HIP_PROBLEM_AND_0_BEING_THE_INABILITY_TO_PERFORM_ANY_OF_YOUR_USUAL_DAILY_ACTIVITIES?: 10
SWINGING_OBJECTS_LIKE_A_GOLF_CLUB: EXTREME DIFFICULTY
GETTING_INTO_AND_OUT_OF_A_BATHTUB: UNABLE TO DO
GOING_UP_1_FLIGHT_OF_STAIRS: MODERATE DIFFICULTY
HEAVY_WORK: EXTREME DIFFICULTY
SITTING FOR 15 MINUTES: NO DIFFICULTY AT ALL
WALK: ACTIVITY IS FAIRLY DIFFICULT
PAIN: THE SYMPTOM AFFECTS MY ACTIVITY MODERATELY
AS_A_RESULT_OF_YOUR_KNEE_INJURY,_HOW_WOULD_YOU_RATE_YOUR_CURRENT_LEVEL_OF_DAILY_ACTIVITY?: ABNORMAL
STEPPING_UP_AND_DOWN_CURBS: SLIGHT DIFFICULTY
SQUAT: I AM UNABLE TO DO THE ACTIVITY
STANDING_FOR_15_MINUTES: EXTREME DIFFICULTY
STIFFNESS: THE SYMPTOM AFFECTS MY ACTIVITY MODERATELY
RISE FROM A CHAIR: ACTIVITY IS SOMEWHAT DIFFICULT
SWELLING: I DO NOT HAVE THE SYMPTOM
HOS_ADL_SCORE(%): 44.12
WALKING_INITIALLY: SLIGHT DIFFICULTY
TWISTING/PIVOTING_ON_INVOLVED_LEG: MODERATE DIFFICULTY
KNEE_ACTIVITY_OF_DAILY_LIVING_SUM: 35
GO UP STAIRS: ACTIVITY IS FAIRLY DIFFICULT
SPORTS_HIGHEST_POTENTIAL_SCORE: 36
WALKING_INITIALLY: SLIGHT DIFFICULTY
PLEASE_INDICATE_YOR_PRIMARY_REASON_FOR_REFERRAL_TO_THERAPY:: KNEE
DEEP SQUATTING: UNABLE TO DO
ADL_COUNT: 17
WEAKNESS: THE SYMPTOM AFFECTS MY ACTIVITY SLIGHTLY
GETTING INTO AND OUT OF AN AVERAGE CAR: SLIGHT DIFFICULTY
STAND: ACTIVITY IS SOMEWHAT DIFFICULT
ADL_SCORE(%): 0
ADL_TOTAL_ITEM_SCORE: 0
GOING DOWN 1 FLIGHT OF STAIRS: MODERATE DIFFICULTY
WALKING_UP_STEEP_HILLS: EXTREME DIFFICULTY
PLEASE_INDICATE_YOR_PRIMARY_REASON_FOR_REFERRAL_TO_THERAPY:: HIP
JUMPING: UNABLE TO DO
RISE FROM A CHAIR: ACTIVITY IS SOMEWHAT DIFFICULT
HOW_WOULD_YOU_RATE_YOUR_CURRENT_LEVEL_OF_FUNCTION_DURING_YOUR_USUAL_ACTIVITIES_OF_DAILY_LIVING_FROM_0_TO_100_WITH_100_BEING_YOUR_LEVEL_OF_FUNCTION_PRIOR_TO_YOUR_HIP_PROBLEM_AND_0_BEING_THE_INABILITY_TO_PERFORM_ANY_OF_YOUR_USUAL_DAILY_ACTIVITIES?: 50
HOS_ADL_ITEM_SCORE_TOTAL: 30
SWELLING: I DO NOT HAVE THE SYMPTOM
LOW_IMPACT_ACTIVITIES_LIKE_FAST_WALKING: UNABLE TO DO
SIT WITH YOUR KNEE BENT: ACTIVITY IS NOT DIFFICULT
LIGHT_TO_MODERATE_WORK: MODERATE DIFFICULTY
ROLLING OVER IN BED: SLIGHT DIFFICULTY
STAND: ACTIVITY IS SOMEWHAT DIFFICULT
SPORTS_TOTAL_ITEM_SCORE: 0
GETTING_INTO_AND_OUT_OF_A_BATHTUB: UNABLE TO DO
WEAKNESS: THE SYMPTOM AFFECTS MY ACTIVITY SLIGHTLY
SPORTS_SCORE(%): 0
WALKING_UP_STEEP_HILLS: EXTREME DIFFICULTY
ABILITY_TO_PERFORM_ACTIVITY_WITH_YOUR_NORMAL_TECHNIQUE: MODERATE DIFFICULTY
KNEEL ON THE FRONT OF YOUR KNEE: I AM UNABLE TO DO THE ACTIVITY
DEEP_SQUATTING: UNABLE TO DO
LIMPING: THE SYMPTOM AFFECTS MY ACTIVITY SLIGHTLY
RECREATIONAL ACTIVITIES: MODERATE DIFFICULTY
RECREATIONAL_ACTIVITIES: MODERATE DIFFICULTY
STIFFNESS: THE SYMPTOM AFFECTS MY ACTIVITY MODERATELY
GOING_DOWN_1_FLIGHT_OF_STAIRS: MODERATE DIFFICULTY
STEPPING UP AND DOWN CURBS: SLIGHT DIFFICULTY
WALK: ACTIVITY IS FAIRLY DIFFICULT
ROLLING_OVER_IN_BED: SLIGHT DIFFICULTY
AS_A_RESULT_OF_YOUR_KNEE_INJURY,_HOW_WOULD_YOU_RATE_YOUR_CURRENT_LEVEL_OF_DAILY_ACTIVITY?: ABNORMAL
SITTING_FOR_15_MINUTES: NO DIFFICULTY AT ALL
WALKING_15_MINUTES_OR_GREATER: MODERATE DIFFICULTY
GO UP STAIRS: ACTIVITY IS FAIRLY DIFFICULT
GETTING_INTO_AND_OUT_OF_AN_AVERAGE_CAR: SLIGHT DIFFICULTY
HOW_WOULD_YOU_RATE_THE_OVERALL_FUNCTION_OF_YOUR_KNEE_DURING_YOUR_USUAL_DAILY_ACTIVITIES?: ABNORMAL
GOING UP 1 FLIGHT OF STAIRS: MODERATE DIFFICULTY
GO DOWN STAIRS: ACTIVITY IS SOMEWHAT DIFFICULT
SQUAT: I AM UNABLE TO DO THE ACTIVITY
WALKING_FOR_APPROXIMATELY_10_MINUTES: MODERATE DIFFICULTY
KNEE_ACTIVITY_OF_DAILY_LIVING_SCORE: 50
PUTTING_ON_SOCKS_AND_SHOES: SLIGHT DIFFICULTY
WALKING_DOWN_STEEP_HILLS: EXTREME DIFFICULTY
RUNNING_ONE_MILE: UNABLE TO DO
LIGHT_TO_MODERATE_WORK: MODERATE DIFFICULTY
GO DOWN STAIRS: ACTIVITY IS SOMEWHAT DIFFICULT
GIVING WAY, BUCKLING OR SHIFTING OF KNEE: THE SYMPTOM AFFECTS MY ACTIVITY MODERATELY
ADL_HIGHEST_POTENTIAL_SCORE: 68
WALKING_15_MINUTES_OR_GREATER: MODERATE DIFFICULTY
PAIN: THE SYMPTOM AFFECTS MY ACTIVITY MODERATELY

## 2024-09-12 ENCOUNTER — THERAPY VISIT (OUTPATIENT)
Dept: PHYSICAL THERAPY | Facility: REHABILITATION | Age: 74
End: 2024-09-12
Attending: STUDENT IN AN ORGANIZED HEALTH CARE EDUCATION/TRAINING PROGRAM
Payer: COMMERCIAL

## 2024-09-12 DIAGNOSIS — M16.11 PRIMARY OSTEOARTHRITIS OF RIGHT HIP: Primary | ICD-10-CM

## 2024-09-12 PROCEDURE — 97110 THERAPEUTIC EXERCISES: CPT | Mod: GP | Performed by: PHYSICAL THERAPIST

## 2024-09-12 PROCEDURE — 97140 MANUAL THERAPY 1/> REGIONS: CPT | Mod: GP | Performed by: PHYSICAL THERAPIST

## 2024-09-17 ENCOUNTER — THERAPY VISIT (OUTPATIENT)
Dept: PHYSICAL THERAPY | Facility: REHABILITATION | Age: 74
End: 2024-09-17
Payer: COMMERCIAL

## 2024-09-17 DIAGNOSIS — M16.11 PRIMARY OSTEOARTHRITIS OF RIGHT HIP: Primary | ICD-10-CM

## 2024-09-17 PROCEDURE — 97110 THERAPEUTIC EXERCISES: CPT | Mod: GP | Performed by: PHYSICAL THERAPIST

## 2024-09-24 ENCOUNTER — PATIENT OUTREACH (OUTPATIENT)
Dept: CARE COORDINATION | Facility: CLINIC | Age: 74
End: 2024-09-24
Payer: COMMERCIAL

## 2024-10-01 ENCOUNTER — THERAPY VISIT (OUTPATIENT)
Dept: PHYSICAL THERAPY | Facility: REHABILITATION | Age: 74
End: 2024-10-01
Payer: COMMERCIAL

## 2024-10-01 DIAGNOSIS — M16.11 PRIMARY OSTEOARTHRITIS OF RIGHT HIP: Primary | ICD-10-CM

## 2024-10-01 PROCEDURE — 97110 THERAPEUTIC EXERCISES: CPT | Mod: GP | Performed by: PHYSICAL THERAPIST

## 2024-10-01 PROCEDURE — 97140 MANUAL THERAPY 1/> REGIONS: CPT | Mod: GP | Performed by: PHYSICAL THERAPIST

## 2024-10-28 DIAGNOSIS — I10 ESSENTIAL HYPERTENSION: ICD-10-CM

## 2024-10-28 RX ORDER — HYDROCHLOROTHIAZIDE 12.5 MG/1
CAPSULE ORAL
Qty: 90 CAPSULE | Refills: 0 | Status: SHIPPED | OUTPATIENT
Start: 2024-10-28

## 2024-11-05 NOTE — PROGRESS NOTES
DISCHARGE  Reason for Discharge: Patient chooses to discontinue therapy.  Patient cancelled remaining scheduled PT appointments, and has not scheduled further appointments.    Equipment Issued: NA    Discharge Plan: Patient to continue home program.    Referring Provider:  Sin Pope     10/01/24 0500   Appointment Info   Signing clinician's name / credentials Mary Byrd, PT DPT   Total/Authorized Visits E&T (12 planned)   Visits Used 4   Medical Diagnosis Primary osteoarthritis of right hip   PT Tx Diagnosis widespread low back, R hip, and B knee pain   Progress Note/Certification   Start of Care Date 08/29/24   Onset of illness/injury or Date of Surgery 08/29/24  (chronic - no specific onset date)   Therapy Frequency 1x/week   Predicted Duration 12 weeks   Certification date from 08/29/24   Certification date to 11/21/24   Progress Note Due Date 10/28/24   Progress Note Completed Date 08/29/24   GOALS   PT Goals 2;3   PT Goal 1   Goal Identifier Walking   Goal Description Pt will be able to walk for >30 min without increase in symptoms <2/10 NRS   Rationale to maximize safety and independence with performance of ADLs and functional tasks;to maximize safety and independence within the home;to maximize safety and independence within the community;to maximize safety and independence with transportation;to maximize safety and independence with self cares   Target Date 11/21/24   PT Goal 2   Goal Identifier Stairs   Goal Description Pt will be able to complete stairs at her niece's house without pain or too much challenge in order to help with childcare and funcational mobility   Target Date 11/21/24   PT Goal 3   Goal Identifier getitng up   Goal Description Pt will be able to get up from a chair without pain or challenge in order to improve functional mobility   Target Date 11/21/24   Subjective Report   Subjective Report The patient reports that she does feel like things are going better. walking  ">1/2 a block, getting out of low chairs and stairs are still challenging. Feels that she is 30% better than when things got to be so bad in May.   Therapeutic Procedure/Exercise   Therapeutic Procedures: strength, endurance, ROM, flexibility minutes (68117) 30   Ther Proc 1 Nustep  seat 9, WL 3 x 6 min   Ther Proc 1 - Details some mild knee pain during   Ther Proc 2 sit to stand   Ther Proc 2 - Details x 10 without UE support   Ther Proc 3 Hip Flexion Straight Leg Raise   Ther Proc 3 - Details x 10 R/L cueing for quad activation   Ther Proc 4 Bridging #1   Ther Proc 4 - Details x 10, cues for not arching low back, tolerated well   Ther Proc 5 LTR   Ther Proc 5 - Details x 20 B cues to keep it in a comfortable range of motion   Ther Proc 6 supine butterfly stretch   Ther Proc 6 - Details slow reps x 10 B   Ther Proc 7 sidelying hip abduction   Ther Proc 7 - Details not today   Ther Proc 8 seated piriformis stretch re-instruction   Ther Proc 8 - Details 30\" x 2 R/L   Ther Proc 9 seated ER stretch with overpressure   Ther Proc 9 - Details 30\" x 2 R/L   Ther Proc 10 LAQ   Ther Proc 10 - Details 5\" x 10 R/L   Skilled Intervention exercise to improve flexiblity and hip mobility and strength-educated regarding the importance of continuing   Patient Response/Progress tolerated well   Manual Therapy   Manual Therapy: Mobilization, MFR, MLD, friction massage minutes (93792) 10   Manual Therapy 1 MFR layers 1-3 right quad and hamstring supine.   Manual Therapy 2 Right patellar mobs grade 2,3 medial, superior, inferior   Manual Therapy 3 right hip distraction mob grade 2,3 suipne   Skilled Intervention manual therapy to improve hip mobility and decrease pain   Patient Response/Progress tolerated well, good improvement with hip distraction   Education   Learner/Method Patient   Education Comments Eager to participate in therapy   Plan   Home program See PTRX- prefers printed   Plan for next session progress balance, WB " strength/stairs   Comments   Comments Patient partially compliant with HEP.  She tolerated it well today with report of some mild back soreness.  We were able to progress the HEP a little bit today. Added some additional core strengthening today.   Total Session Time   Timed Code Treatment Minutes 40   Total Treatment Time (sum of timed and untimed services) 40

## 2024-11-25 ENCOUNTER — TELEPHONE (OUTPATIENT)
Dept: OTHER | Age: 74
End: 2024-11-25

## 2024-11-25 NOTE — TELEPHONE ENCOUNTER
Other: pt is scheduled to have knee inj, wanted an provider.  Has had synvisc before but is not sure if both knees have a prior auth.  Can you check.   Pt is saying would go with 1 as synvisc and 1 as cortisone to be able to come in in a week.      Could we send this information to you in [facility] or would you prefer to receive a phone call?:   Patient would prefer a phone call   Okay to leave a detailed message?: Yes at Cell number on file:    Telephone Information:   Mobile [phone]

## 2024-11-25 NOTE — TELEPHONE ENCOUNTER
Upon chart review, patient is unable to get a Synvisc injection again, due to last injection being 7/20/24. Patient eligible to receive bilateral corticosteroid injections. Outbound call made to patient to discuss. Patient stated that she will proceed with bilateral corticosteroid injections. Patient appointment note will be updated to reflect this.    WILLEM Oviedo, LAT, ATC

## 2024-12-02 ENCOUNTER — ANCILLARY PROCEDURE (OUTPATIENT)
Dept: GENERAL RADIOLOGY | Facility: CLINIC | Age: 74
End: 2024-12-02
Attending: STUDENT IN AN ORGANIZED HEALTH CARE EDUCATION/TRAINING PROGRAM
Payer: COMMERCIAL

## 2024-12-02 ENCOUNTER — OFFICE VISIT (OUTPATIENT)
Dept: ORTHOPEDICS | Facility: CLINIC | Age: 74
End: 2024-12-02
Payer: COMMERCIAL

## 2024-12-02 DIAGNOSIS — M25.511 ACUTE PAIN OF RIGHT SHOULDER: ICD-10-CM

## 2024-12-02 DIAGNOSIS — M17.0 BILATERAL PRIMARY OSTEOARTHRITIS OF KNEE: Primary | ICD-10-CM

## 2024-12-02 PROCEDURE — 20611 DRAIN/INJ JOINT/BURSA W/US: CPT | Mod: 50 | Performed by: STUDENT IN AN ORGANIZED HEALTH CARE EDUCATION/TRAINING PROGRAM

## 2024-12-02 PROCEDURE — 99212 OFFICE O/P EST SF 10 MIN: CPT | Mod: 25 | Performed by: STUDENT IN AN ORGANIZED HEALTH CARE EDUCATION/TRAINING PROGRAM

## 2024-12-02 PROCEDURE — 73030 X-RAY EXAM OF SHOULDER: CPT | Mod: TC | Performed by: STUDENT IN AN ORGANIZED HEALTH CARE EDUCATION/TRAINING PROGRAM

## 2024-12-02 RX ORDER — TRIAMCINOLONE ACETONIDE 40 MG/ML
40 INJECTION, SUSPENSION INTRA-ARTICULAR; INTRAMUSCULAR
Status: COMPLETED | OUTPATIENT
Start: 2024-12-02 | End: 2024-12-02

## 2024-12-02 RX ORDER — LIDOCAINE HYDROCHLORIDE 10 MG/ML
3 INJECTION, SOLUTION INFILTRATION; PERINEURAL
Status: COMPLETED | OUTPATIENT
Start: 2024-12-02 | End: 2024-12-02

## 2024-12-02 RX ORDER — ROPIVACAINE HYDROCHLORIDE 5 MG/ML
4 INJECTION, SOLUTION EPIDURAL; INFILTRATION; PERINEURAL
Status: COMPLETED | OUTPATIENT
Start: 2024-12-02 | End: 2024-12-02

## 2024-12-02 RX ADMIN — TRIAMCINOLONE ACETONIDE 40 MG: 40 INJECTION, SUSPENSION INTRA-ARTICULAR; INTRAMUSCULAR at 15:09

## 2024-12-02 RX ADMIN — ROPIVACAINE HYDROCHLORIDE 4 ML: 5 INJECTION, SOLUTION EPIDURAL; INFILTRATION; PERINEURAL at 15:09

## 2024-12-02 RX ADMIN — LIDOCAINE HYDROCHLORIDE 3 ML: 10 INJECTION, SOLUTION INFILTRATION; PERINEURAL at 15:09

## 2024-12-02 NOTE — PATIENT INSTRUCTIONS
"1. Bilateral primary osteoarthritis of knee    2. Acute pain of right shoulder        Plan:  -Bilateral knee corticosteroid injections performed in clinic today.  Can return for repeat hyaluronic acid injection after it has been 6 months since previous injection  - Continue home exercise program  - Will return tomorrow to discuss right shoulder pain.  X-ray placed for right shoulder to get after injections today    If you had imaging performed/ordered at your appointment today, you can expect to see your radiology results in [facility] within approximately 48 business hours.     If you have questions/concerns after your appointment, please send my team a TeamPages message or call the clinic at [phone].     Dr. Mana Monge, DO, [facility]  Sports Medicine and Orthopedics    Dr. Monge's Clinic Locations and Contact Numbers:   [facility] APPOINTMENTS: [phone]      [address] [facility] RADIOLOGY: [phone]   [place] [address] PHYSICAL THERAPY: [phone]    HAND THERAPY/OT: [phone]   [facility] BILLING QUESTIONS: [phone] [id number] FAX: [phone]   [place] [address]       Non-Operative treatment of Knee Osteoarthritis    To Decrease Stress  Try to get regular exercise and stay active, try lower impact activity such as elliptical, bicycle, swimming, or walking  Muscle Strengthening: Consider physical therapy and start home exercise program  Weight Control  Consider using walking poles/cane or a knee brace to offload knee    To Decrease Pain  Tylenol (Acetaminophen) as needed 2 tablets (1,000 mg) three times per day, not to exceed 3,000 mg per day   Trial topical Voltaren (Diclofenac) gel up to 4x daily to area of pain  Glucosamine chondroitin or Tumeric (Curcumin) supplements. (May try taking for 3 months and if helpful, continue)  Steroid injections (no sooner than every 3 months)  \"Gel\" (Viscosupplementation) injections (Synvisc, Euflexxa, etc. are brand " names)  Platelet Rich Plasma (Not covered by insurance)    Free Online Resources for Knee Osteoarthritis  My Knee exercise: Online program provides education and a 6-month knee strengthening program: [email or web address].org.[email or web address]/  My Joint Yoga: Online 12-week yoga program with videos for knee/hip osteoarthritis: https://[employer].au/     Post-Injection Discharge Instructions    You may shower, however avoid swimming, tub baths or hot tubs for 24 hours following your procedure  You may have a mild to moderate increase in pain for a few days following the injection.  The lidocaine (local numbing medicine) will wear off in several hours. It usually takes 3-5 days for the steroid medication to start working although it may take up to 14 days for full effect.   You may use ice packs for 10-15 minutes, 3 to 4 times a day at the injection site for comfort if needed  You may use extra strength Tylenol for pain control if necessary   If you were fasting, you may resume your normal diet and medications after the procedure  If you have diabetes, your blood sugar may be higher than normal for 10-14 days following a steroid injection. Contact your doctor who manages your diabetes if your blood sugar is significantly higher than usual    If you experience any of the following, call Sports Medicine @ [phone]  -Fever over 100 degrees F  -Swelling, bleeding, redness, drainage, warmth at the injection site  -New or significant worsening pain

## 2024-12-02 NOTE — LETTER
12/2/2024      Andree Trujillo  [address] [facility] [address]      Dear Colleague,    Thank you for referring your patient, Andree Trujillo, to the [facility]. Please see a copy of my visit note below.    Sports Medicine Procedure Note    Helen was seen today for pain and pain.    Diagnoses and all orders for this visit:    Bilateral primary osteoarthritis of knee    Acute pain of right shoulder  -     XR Shoulder Right G/E 3 Views; Future    Other orders  -     Large Joint Injection/Arthocentesis: bilateral knee        Andree Trujillo is a/an 74 year old female who is seen for Corticosteroid injection of bilateral knees.   Last injection: Hyaluronic acid right knee, provided 2-3 months relief.     Plan:  -Bilateral knee corticosteroid injections performed in clinic today.  Can return for repeat hyaluronic acid injection after it has been 6 months since previous injection  - Continue home exercise program  - Will return tomorrow to discuss right shoulder pain.  X-ray placed for right shoulder to get after injections today  -Post-injection instructions were provided to the patient  -Consider referral to orthopedic surgery to discuss knee replacement if injections are no longer providing adequate relief  -Follow-up: PRN      Post-Injection Discharge Instructions    You may shower, however avoid swimming, tub baths or hot tubs for 24 hours following your procedure  You may have a mild to moderate increase in pain for a few days following the injection.  The lidocaine (local numbing medicine) will wear off in several hours. It usually takes 3-5 days for the steroid medication to start working although it may take up to 14 days for full effect.   You may use ice packs for 10-15 minutes, 3 to 4 times a day at the injection site for comfort if needed  You may use extra strength Tylenol for pain control if necessary   If you were fasting, you may resume your normal diet  and medications after the procedure  If you have diabetes, your blood sugar may be higher than normal for 10-14 days following a steroid injection. Contact your doctor who manages your diabetes if your blood sugar is significantly higher than usual    If you experience any of the following, call Sports Medicine @  [phone]  -Fever over 100 degrees F  -Swelling, bleeding, redness, drainage, warmth at the injection site  -New or significant worsening pain    Large Joint Injection/Arthocentesis: bilateral knee    Date/Time: 12/2/2024 3:09 PM    Performed by: Mana Monge DO  Authorized by: Mana Monge DO    Indications:  Osteoarthritis  Needle Size:  25 G  Guidance: ultrasound    Approach:  Anterolateral  Location:  Knee  Laterality:  Bilateral      Medications (Right):  40 mg triamcinolone 40 MG/ML; 3 mL lidocaine 1 %; 4 mL ROPivacaine 5 MG/ML  Aspirate amount (mL):  6  Aspirate:  Clear and yellow  Medications (Left):  40 mg triamcinolone 40 MG/ML; 3 mL lidocaine 1 %; 4 mL ROPivacaine 5 MG/ML  Outcome:  Tolerated well, no immediate complications  Procedure discussed: discussed risks, benefits, and alternatives    Consent Given by:  Patient  Timeout: timeout called immediately prior to procedure    Prep: patient was prepped and draped in usual sterile fashion     Ultrasound was used to ensure safe and accurate needle placement and injection. Ultrasound images of the procedure were permanently stored.  1ml of 8.4% Sodium Bicarbonate solution was used to buffer the local numbing agent for today's injection. 1ml of 8.4% Sodium Bicarbonate solution was used to buffer the local numbing agent for today's injection          Dr. Mana Monge DO  [facility]  Sports Medicine     -----      Again, thank you for allowing me to participate in the care of your patient.        Sincerely,        Mana Monge DO

## 2024-12-02 NOTE — PROGRESS NOTES
Sports Medicine Procedure Note    Helen was seen today for pain and pain.    Diagnoses and all orders for this visit:    Bilateral primary osteoarthritis of knee    Acute pain of right shoulder  -     XR Shoulder Right G/E 3 Views; Future    Other orders  -     Large Joint Injection/Arthocentesis: bilateral knee        Andree Trujillo is a/an 74 year old female who is seen for Corticosteroid injection of bilateral knees.   Last injection: Hyaluronic acid right knee, provided 2-3 months relief.     Plan:  -Bilateral knee corticosteroid injections performed in clinic today.  Can return for repeat hyaluronic acid injection after it has been 6 months since previous injection  - Continue home exercise program  - Will return tomorrow to discuss right shoulder pain.  X-ray placed for right shoulder to get after injections today  -Post-injection instructions were provided to the patient  -Consider referral to orthopedic surgery to discuss knee replacement if injections are no longer providing adequate relief  -Follow-up: PRN      Post-Injection Discharge Instructions    You may shower, however avoid swimming, tub baths or hot tubs for 24 hours following your procedure  You may have a mild to moderate increase in pain for a few days following the injection.  The lidocaine (local numbing medicine) will wear off in several hours. It usually takes 3-5 days for the steroid medication to start working although it may take up to 14 days for full effect.   You may use ice packs for 10-15 minutes, 3 to 4 times a day at the injection site for comfort if needed  You may use extra strength Tylenol for pain control if necessary   If you were fasting, you may resume your normal diet and medications after the procedure  If you have diabetes, your blood sugar may be higher than normal for 10-14 days following a steroid injection. Contact your doctor who manages your diabetes if your blood sugar is significantly higher than usual    If you  experience any of the following, call Sports Medicine @  [phone]  -Fever over 100 degrees F  -Swelling, bleeding, redness, drainage, warmth at the injection site  -New or significant worsening pain    Large Joint Injection/Arthocentesis: bilateral knee    Date/Time: 12/2/2024 3:09 PM    Performed by: Mana Monge DO  Authorized by: Mana Monge DO    Indications:  Osteoarthritis  Needle Size:  25 G  Guidance: ultrasound    Approach:  Anterolateral  Location:  Knee  Laterality:  Bilateral      Medications (Right):  40 mg triamcinolone 40 MG/ML; 3 mL lidocaine 1 %; 4 mL ROPivacaine 5 MG/ML  Aspirate amount (mL):  6  Aspirate:  Clear and yellow  Medications (Left):  40 mg triamcinolone 40 MG/ML; 3 mL lidocaine 1 %; 4 mL ROPivacaine 5 MG/ML  Outcome:  Tolerated well, no immediate complications  Procedure discussed: discussed risks, benefits, and alternatives    Consent Given by:  Patient  Timeout: timeout called immediately prior to procedure    Prep: patient was prepped and draped in usual sterile fashion     Ultrasound was used to ensure safe and accurate needle placement and injection. Ultrasound images of the procedure were permanently stored.  1ml of 8.4% Sodium Bicarbonate solution was used to buffer the local numbing agent for today's injection. 1ml of 8.4% Sodium Bicarbonate solution was used to buffer the local numbing agent for today's injection          Dr. Mana Monge DO  [facility]  Sports Medicine     -----

## 2024-12-03 ENCOUNTER — OFFICE VISIT (OUTPATIENT)
Dept: ORTHOPEDICS | Facility: CLINIC | Age: 74
End: 2024-12-03
Payer: COMMERCIAL

## 2024-12-03 DIAGNOSIS — M65.20 CALCIFIC TENDONITIS: ICD-10-CM

## 2024-12-03 DIAGNOSIS — M19.011 OSTEOARTHRITIS OF RIGHT GLENOHUMERAL JOINT: ICD-10-CM

## 2024-12-03 DIAGNOSIS — M75.81 RIGHT ROTATOR CUFF TENDONITIS: Primary | ICD-10-CM

## 2024-12-03 PROCEDURE — G2211 COMPLEX E/M VISIT ADD ON: HCPCS | Performed by: STUDENT IN AN ORGANIZED HEALTH CARE EDUCATION/TRAINING PROGRAM

## 2024-12-03 PROCEDURE — 99213 OFFICE O/P EST LOW 20 MIN: CPT | Performed by: STUDENT IN AN ORGANIZED HEALTH CARE EDUCATION/TRAINING PROGRAM

## 2024-12-03 NOTE — PATIENT INSTRUCTIONS
1. Right rotator cuff tendonitis    2. Osteoarthritis of right glenohumeral joint    3. Calcific tendonitis        Plan:  -Start home exercise program and consider formal PT in the future  -Can return any time for injections (subacromial bursa injection)  -Can try TENEX procedure in the future   -Tylenol Extra Strength (1000mg) up to three times daily as needed for pain  -Aleve as needed, try Tylenol first       If you had imaging performed/ordered at your appointment today, you can expect to see your radiology results in [facility] within approximately 48 business hours.     If you have questions/concerns after your appointment, please send my team a [facility] message or call the clinic at [phone].     Dr. Mana Monge, DO, [facility]  Sports Medicine and Orthopedics    Dr. Monge's Clinic Locations and Contact Numbers:   [place] APPOINTMENTS: [phone]      [address] [facility] RADIOLOGY: [phone]   [place] [address] PHYSICAL THERAPY: [phone]    HAND THERAPY/OT: [phone]   [place] BILLING QUESTIONS: [phone] [address] FAX: [phone]   [place] [address]       Rotator Cuff: Exercises  Introduction  Here are some examples of exercises for you to try to help with your rotator cuff tendonitis. The exercises may be suggested for a condition or for rehabilitation. Start each exercise slowly. Ease off the exercises if you start to have pain. You will be told when to start these exercises and which ones will work best for you.    How to do the exercises    Pendulum swing- Start with this exercise first    If you have pain in your back, do not do this exercise.  Hold on to a table or the back of a chair with your good arm. Then bend forward a little and let your sore arm hang straight down. This exercise does not use the arm muscles. Rather, use your legs and your hips to create movement that makes your arm swing freely.  Use the movement from your hips and legs to  "guide the slightly swinging arm back and forth like a pendulum (or elephant trunk). Then guide it in circles that start small (about the size of a dinner plate). Make the circles a bit larger each day, as your pain allows.  Do this exercise for 10 swings in each direction (clockwise and counter-clockwise), 3-4 times each day.  As you have less pain, try bending over a little farther to do this exercise. This will increase the amount of movement at your shoulder.        Scapular exercise: Arm reach    Lie flat on your back. This exercise is a very slight motion that starts with your arms raised (elbows straight, arms straight).  From this position, reach higher toward the russ or ceiling. Keep your elbows straight. All motion should be from your shoulder blade only.  Relax your arms back to where you started.  Repeat 8 to 12 times.    Arm raise to the side    During this strengthening exercise, your arm should stay about 30 degrees to the front of your side.  Slowly raise your injured arm to the side, with your thumb facing up. Raise your arm 60 degrees at the most (shoulder level is 90 degrees).  Hold the position for 3 to 5 seconds. Then lower your arm back to your side. If you need to, bring your \"good\" arm across your body and place it under the elbow as you lower your injured arm. Use your good arm to keep your injured arm from dropping down too fast.  Repeat 8 to 12 times.  When you first start out, don't hold any extra weight in your hand. As you get stronger, you may use a 1-pound to 2-pound dumbbell or a small can of food.    Shoulder flexor and extensor exercise    These are isometric exercises. That means you contract your muscles without actually moving.  Push forward (flex): Stand facing a wall or doorjamb, about 6 inches or less back. Hold your injured arm against your body. Make a closed fist with your thumb on top. Then gently push your hand forward into the wall with about 25% to 50% of your strength. " Don't let your body move backward as you push. Hold for about 6 seconds. Relax for a few seconds. Repeat 8 to 12 times.  Push backward (extend): Stand with your back flat against a wall. Your upper arm should be against the wall, with your elbow bent 90 degrees (your hand straight ahead). Push your elbow gently back against the wall with about 25% to 50% of your strength. Don't let your body move forward as you push. Hold for about 6 seconds. Relax for a few seconds. Repeat 8 to 12 times.    Scapular exercise: Wall push-ups    This exercise is best done with your fingers somewhat turned out, rather than straight up and down.  Stand facing a wall, about 12 inches to 18 inches away.  Place your hands on the wall at shoulder height.  Slowly bend your elbows and bring your face to the wall. Keep your back and hips straight.  Push back to where you started.  Repeat 8 to 12 times.  When you can do this exercise against a wall comfortably, you can try it against a counter. You can then slowly progress to the end of a couch, then to a sturdy chair, and finally to the floor.    Scapular exercise: Retraction    For this exercise, you will need elastic exercise material, such as surgical tubing or Thera-Band.  Put the band around a solid object at about waist level. (A bedpost will work well.) Each hand should hold an end of the band.  With your elbows at your sides and bent to 90 degrees, pull the band back. Your shoulder blades should move toward each other. Then move your arms back where you started.  Repeat 8 to 12 times.  If you have good range of motion in your shoulders, try this exercise with your arms lifted out to the sides. Keep your elbows at a 90-degree angle. Raise the elastic band up to about shoulder level. Pull the band back to move your shoulder blades toward each other. Then move your arms back where you started.    Internal rotator strengthening exercise    Start by tying a piece of elastic exercise  material to a doorknob. You can use surgical tubing or Thera-Band.  Stand or sit with your shoulder relaxed and your elbow bent 90 degrees. Your upper arm should rest comfortably against your side. Squeeze a rolled towel between your elbow and your body for comfort. This will help keep your arm at your side.  Hold one end of the elastic band in the hand of the painful arm.  Slowly rotate your forearm toward your body until it touches your belly. Slowly move it back to where you started.  Keep your elbow and upper arm firmly tucked against the towel roll or at your side.  Repeat 8 to 12 times.    External rotator strengthening exercise    Start by tying a piece of elastic exercise material to a doorknob. You can use surgical tubing or Thera-Band. (You may also hold one end of the band in each hand.)  Stand or sit with your shoulder relaxed and your elbow bent 90 degrees. Your upper arm should rest comfortably against your side. Squeeze a rolled towel between your elbow and your body for comfort. This will help keep your arm at your side.  Hold one end of the elastic band with the hand of the painful arm.  Start with your forearm across your belly. Slowly rotate the forearm out away from your body. Keep your elbow and upper arm tucked against the towel roll or the side of your body until you begin to feel tightness in your shoulder. Slowly move your arm back to where you started.  Repeat 8 to 12 times.    Follow-up care is a key part of your treatment and safety. Be sure to make and go to all appointments, and call your doctor if you are having problems. It's also a good idea to know your test results and keep a list of the medicines you take.    Current as of: November 9, 2022               Content Version: 13.7    6981-8178 [employer].   Care instructions adapted under license by your healthcare professional. If you have questions about a medical condition or this instruction, always ask your  healthcare professional. [employer] disclaims any warranty or liability for your use of this information.

## 2024-12-03 NOTE — PROGRESS NOTES
ASSESSMENT & PLAN    Helen was seen today for pain.    Diagnoses and all orders for this visit:    Right rotator cuff tendonitis    Osteoarthritis of right glenohumeral joint    Calcific tendonitis        Andree is a 74 year old female who presents for gradual right shoulder pain over the past 3 months. On exam she has weakness and pain with supraspinatus testing. Shoulder x-ray is suggestive of mild osteoarthritis of AC joint and calcific tendonitis.  Her pain is improved since bilateral knee corticosteroid injections yesterday and she would like to start with conservative treatment as this does not consistently bother her.     Plan:  -Start home exercise program and consider formal PT in the future  -Can return any time for injections (subacromial bursa injection)  -Can try TENEX procedure in the future   -Tylenol Extra Strength (1000mg) up to three times daily as needed for pain  -Aleve as needed, try Tylenol first       Dr. Mana Monge, DO, CAQ  [facility]  Sports Medicine     -----  Chief Complaint   Patient presents with    Right Shoulder - Pain       SUBJECTIVE  Andree Trujillo is a/an 74 year old female who is seen as a self referral for evaluation of right shoulder pain. Gradual onset that started 2-4 months ago - patient thinks it is from pushing out of chairs due to knee pain. Pain is located anterior to posterior, occasionally lateral arm. Symptoms are worsened by pulling, pushing, extension. She has tried Gabapentin (prescribed for back), Aleve. Associated symptoms include: radiating/referred pain to the lateral arm ; occasionally has right sided neck pain after reading - may be unrelated to shoulder.    The patient is seen alone    Prior injury/Surgical history of affected joint: No  Social History/Occupation: Retired    REVIEW OF SYSTEMS:  Pertinent positives/negative: As stated above in HPI    OBJECTIVE:  There were no vitals taken for this visit.   General: Alert and  in no distress  CV: Extremities appear well perfused   Resp: normal respiratory effort  MSK:  Right Shoulder Exam:  Appearance: Symmetric shoulder heights, No scapular winging observed  Palpation: Tender to palpation of AC joint  Rotator cuff strength:    Supraspinatus: 4/5   ER: 5/5   IR: 5/5  AROM:    Forward flexion: 180   Abduction: 170   ER at side: 90   IR: To belt line  Special tests: + for Empty can       RADIOLOGY:  Final results and radiologist's interpretation available in the [facility] health record.  Images below were personally reviewed and discussed with the patient in the office today.  My personal interpretation of the performed imaging: X-ray of the right shoulder reveals apparent calcific tendinitis of the rotator cuff, mild degenerative changes of the glenohumeral and acromioclavicular joint, no acute fractures                 Disclaimer: This note consists of text and symbols derived from dictation and/or voice recognition software. As a result, there may be errors in the script that have gone undetected. Please consider this when interpreting information found in this chart.

## 2024-12-03 NOTE — LETTER
12/3/2024      Andree Trujillo  [address] [facility] [address]      Dear Colleague,    Thank you for referring your patient, Andree Trujillo, to the [facility]TS MEDICINE CLIN[facility]. Please see a copy of my visit note below.    ASSESSMENT & PLAN    Helen was seen today for pain.    Diagnoses and all orders for this visit:    Right rotator cuff tendonitis    Osteoarthritis of right glenohumeral joint    Calcific tendonitis        Andree is a 74 year old female who presents for gradual right shoulder pain over the past 3 months. On exam she has weakness and pain with supraspinatus testing. Shoulder x-ray is suggestive of mild osteoarthritis of AC joint and calcific tendonitis.  Her pain is improved since bilateral knee corticosteroid injections yesterday and she would like to start with conservative treatment as this does not consistently bother her.     Plan:  -Start home exercise program and consider formal PT in the future  -Can return any time for injections (subacromial bursa injection)  -Can try TENEX procedure in the future   -Tylenol Extra Strength (1000mg) up to three times daily as needed for pain  -Aleve as needed, try Tylenol first       Dr. Mana Monge, DO, CAQ  [facility]  Sports Medicine     -----  Chief Complaint   Patient presents with     Right Shoulder - Pain       SUBJECTIVE  Andree Trujillo is a/an 74 year old female who is seen as a self referral for evaluation of right shoulder pain. Gradual onset that started 2-4 months ago - patient thinks it is from pushing out of chairs due to knee pain. Pain is located anterior to posterior, occasionally lateral arm. Symptoms are worsened by pulling, pushing, extension. She has tried Gabapentin (prescribed for back), Aleve. Associated symptoms include: radiating/referred pain to the lateral arm ; occasionally has right sided neck pain after reading - may be unrelated to shoulder.    The patient  is seen alone    Prior injury/Surgical history of affected joint: No  Social History/Occupation: Retired    REVIEW OF SYSTEMS:  Pertinent positives/negative: As stated above in HPI    OBJECTIVE:  There were no vitals taken for this visit.   General: Alert and in no distress  CV: Extremities appear well perfused   Resp: normal respiratory effort  MSK:  Right Shoulder Exam:  Appearance: Symmetric shoulder heights, No scapular winging observed  Palpation: Tender to palpation of AC joint  Rotator cuff strength:    Supraspinatus: 4/5   ER: 5/5   IR: 5/5  AROM:    Forward flexion: 180   Abduction: 170   ER at side: 90   IR: To belt line  Special tests: + for Empty can       RADIOLOGY:  Final results and radiologist's interpretation available in the [facility] health record.  Images below were personally reviewed and discussed with the patient in the office today.  My personal interpretation of the performed imaging: X-ray of the right shoulder reveals apparent calcific tendinitis of the rotator cuff, mild degenerative changes of the glenohumeral and acromioclavicular joint, no acute fractures                 Disclaimer: This note consists of text and symbols derived from dictation and/or voice recognition software. As a result, there may be errors in the script that have gone undetected. Please consider this when interpreting information found in this chart.        Again, thank you for allowing me to participate in the care of your patient.        Sincerely,        Mana Monge, DO

## 2024-12-05 DIAGNOSIS — G25.81 RESTLESS LEG SYNDROME: ICD-10-CM

## 2024-12-05 RX ORDER — PRAMIPEXOLE DIHYDROCHLORIDE 0.12 MG/1
TABLET ORAL
Qty: 180 TABLET | Refills: 0 | Status: SHIPPED | OUTPATIENT
Start: 2024-12-05

## 2024-12-17 ENCOUNTER — OFFICE VISIT (OUTPATIENT)
Dept: ORTHOPEDICS | Facility: CLINIC | Age: 74
End: 2024-12-17
Payer: COMMERCIAL

## 2024-12-17 DIAGNOSIS — M75.41 IMPINGEMENT SYNDROME OF RIGHT SHOULDER: Primary | ICD-10-CM

## 2024-12-17 DIAGNOSIS — M75.81 RIGHT ROTATOR CUFF TENDONITIS: ICD-10-CM

## 2024-12-17 PROCEDURE — 99207 PR NO BILLABLE SERVICE THIS VISIT: CPT | Mod: 25 | Performed by: STUDENT IN AN ORGANIZED HEALTH CARE EDUCATION/TRAINING PROGRAM

## 2024-12-17 PROCEDURE — 20611 DRAIN/INJ JOINT/BURSA W/US: CPT | Mod: RT | Performed by: STUDENT IN AN ORGANIZED HEALTH CARE EDUCATION/TRAINING PROGRAM

## 2024-12-17 RX ORDER — TRIAMCINOLONE ACETONIDE 40 MG/ML
40 INJECTION, SUSPENSION INTRA-ARTICULAR; INTRAMUSCULAR
Status: COMPLETED | OUTPATIENT
Start: 2024-12-17 | End: 2024-12-17

## 2024-12-17 RX ORDER — LIDOCAINE HYDROCHLORIDE 10 MG/ML
1 INJECTION, SOLUTION INFILTRATION; PERINEURAL
Status: COMPLETED | OUTPATIENT
Start: 2024-12-17 | End: 2024-12-17

## 2024-12-17 RX ORDER — LIDOCAINE HYDROCHLORIDE 10 MG/ML
3 INJECTION, SOLUTION INFILTRATION; PERINEURAL
Status: COMPLETED | OUTPATIENT
Start: 2024-12-17 | End: 2024-12-17

## 2024-12-17 RX ADMIN — LIDOCAINE HYDROCHLORIDE 1 ML: 10 INJECTION, SOLUTION INFILTRATION; PERINEURAL at 15:26

## 2024-12-17 RX ADMIN — LIDOCAINE HYDROCHLORIDE 3 ML: 10 INJECTION, SOLUTION INFILTRATION; PERINEURAL at 15:26

## 2024-12-17 RX ADMIN — TRIAMCINOLONE ACETONIDE 40 MG: 40 INJECTION, SUSPENSION INTRA-ARTICULAR; INTRAMUSCULAR at 15:26

## 2024-12-17 NOTE — LETTER
12/17/2024      Andree Trujillo  [address] [facility] [address]      Dear Colleague,    Thank you for referring your patient, Andree Trujillo, to the [facility] SPORTS MEDICINE CLINIC [facility]. Please see a copy of my visit note below.    Sports Medicine Procedure Note    Diagnoses and all orders for this visit:    Impingement syndrome of right shoulder  -     Large Joint Injection/Arthocentesis: R subacromial bursa    Right rotator cuff tendonitis  -     Large Joint Injection/Arthocentesis: R subacromial bursa        Andree Trujillo is a/an 74 year old female who is seen for Corticosteroid injection of right subacromial bursa.   Last injection: 12/2/24 for bilateral knees    Plan:  -Right subacromial bursa injection performed in clinic today  - Start home exercise program  - If no improvement consider formal physical therapy  - Follow-up as needed or if symptoms worsen or fail to improve  -Post-injection instructions were provided to the patient  -Follow-up: PRN      Post-Injection Discharge Instructions    You may shower, however avoid swimming, tub baths or hot tubs for 24 hours following your procedure  You may have a mild to moderate increase in pain for a few days following the injection.  The lidocaine (local numbing medicine) will wear off in several hours. It usually takes 3-5 days for the steroid medication to start working although it may take up to 14 days for full effect.   You may use ice packs for 10-15 minutes, 3 to 4 times a day at the injection site for comfort if needed  You may use extra strength Tylenol for pain control if necessary   If you were fasting, you may resume your normal diet and medications after the procedure  If you have diabetes, your blood sugar may be higher than normal for 10-14 days following a steroid injection. Contact your doctor who manages your diabetes if your blood sugar is significantly higher than usual    If you experience any of the following,  call Sports Medicine @  [phone]  -Fever over 100 degrees F  -Swelling, bleeding, redness, drainage, warmth at the injection site  -New or significant worsening pain    Large Joint Injection/Arthocentesis: R subacromial bursa    Date/Time: 12/17/2024 3:26 PM    Performed by: Mana Monge DO  Authorized by: Mana Monge DO    Indications:  Pain  Needle Size:  25 G  Guidance: ultrasound    Approach:  Posterior  Location:  Shoulder      Site:  R subacromial bursa  Medications:  40 mg triamcinolone 40 MG/ML; 1 mL lidocaine 1 %; 3 mL lidocaine 1 %  Outcome:  Tolerated well, no immediate complications  Procedure discussed: discussed risks, benefits, and alternatives    Consent Given by:  Patient  Timeout: timeout called immediately prior to procedure    Prep: patient was prepped and draped in usual sterile fashion     Ultrasound was used to ensure safe and accurate needle placement and injection. Ultrasound images of the procedure were permanently stored. 1ml of 8.4% Sodium Bicarbonate solution was used to buffer the local numbing agent for today's injection        Dr. Maan Monge DO  [facility]  Sports Medicine     -----      Again, thank you for allowing me to participate in the care of your patient.        Sincerely,        Mana Monge DO

## 2024-12-17 NOTE — PROGRESS NOTES
Sports Medicine Procedure Note    Diagnoses and all orders for this visit:    Impingement syndrome of right shoulder  -     Large Joint Injection/Arthocentesis: R subacromial bursa    Right rotator cuff tendonitis  -     Large Joint Injection/Arthocentesis: R subacromial bursa        Andree Trujillo is a/an 74 year old female who is seen for Corticosteroid injection of right subacromial bursa.   Last injection: 12/2/24 for bilateral knees    Plan:  -Right subacromial bursa injection performed in clinic today  - Start home exercise program  - If no improvement consider formal physical therapy  - Follow-up as needed or if symptoms worsen or fail to improve  -Post-injection instructions were provided to the patient  -Follow-up: PRN      Post-Injection Discharge Instructions    You may shower, however avoid swimming, tub baths or hot tubs for 24 hours following your procedure  You may have a mild to moderate increase in pain for a few days following the injection.  The lidocaine (local numbing medicine) will wear off in several hours. It usually takes 3-5 days for the steroid medication to start working although it may take up to 14 days for full effect.   You may use ice packs for 10-15 minutes, 3 to 4 times a day at the injection site for comfort if needed  You may use extra strength Tylenol for pain control if necessary   If you were fasting, you may resume your normal diet and medications after the procedure  If you have diabetes, your blood sugar may be higher than normal for 10-14 days following a steroid injection. Contact your doctor who manages your diabetes if your blood sugar is significantly higher than usual    If you experience any of the following, call Sports Medicine @  [phone]  -Fever over 100 degrees F  -Swelling, bleeding, redness, drainage, warmth at the injection site  -New or significant worsening pain    Large Joint Injection/Arthocentesis: R subacromial bursa    Date/Time: 12/17/2024 3:26  PM    Performed by: Mana Monge DO  Authorized by: Mana Monge DO    Indications:  Pain  Needle Size:  25 G  Guidance: ultrasound    Approach:  Posterior  Location:  Shoulder      Site:  R subacromial bursa  Medications:  40 mg triamcinolone 40 MG/ML; 1 mL lidocaine 1 %; 3 mL lidocaine 1 %  Outcome:  Tolerated well, no immediate complications  Procedure discussed: discussed risks, benefits, and alternatives    Consent Given by:  Patient  Timeout: timeout called immediately prior to procedure    Prep: patient was prepped and draped in usual sterile fashion     Ultrasound was used to ensure safe and accurate needle placement and injection. Ultrasound images of the procedure were permanently stored. 1ml of 8.4% Sodium Bicarbonate solution was used to buffer the local numbing agent for today's injection        Dr. Mana Monge DO  [facility]  Sports Medicine     -----

## 2024-12-17 NOTE — PATIENT INSTRUCTIONS
1. Impingement syndrome of right shoulder    2. Right rotator cuff tendonitis        Plan:  -Right subacromial bursa injection performed in clinic today  - Start home exercise program  - If no improvement consider formal physical therapy  - Follow-up as needed or if symptoms worsen or fail to improve    If you had imaging performed/ordered at your appointment today, you can expect to see your radiology results in "[employer]hart within approximately 48 business hours.     If you have questions/concerns after your appointment, please send my team a [employer] message or call the clinic at [phone].     Dr. Mana Monge, DO, [facility]  Sports Medicine and Orthopedics    Dr. Monge's Clinic Locations and Contact Numbers:   [place] APPOINTMENTS: [phone]      [address] [facility] RADIOLOGY: [phone]   [place] [address] PHYSICAL THERAPY: [phone]    HAND THERAPY/OT: [phone]   [place] BILLING QUESTIONS: [phone] [address] FAX: [phone]   [place] [address]       Post-Injection Discharge Instructions    You may shower, however avoid swimming, tub baths or hot tubs for 24 hours following your procedure  You may have a mild to moderate increase in pain for a few days following the injection.  The lidocaine (local numbing medicine) will wear off in several hours. It usually takes 3-5 days for the steroid medication to start working although it may take up to 14 days for full effect.   You may use ice packs for 10-15 minutes, 3 to 4 times a day at the injection site for comfort if needed  You may use extra strength Tylenol for pain control if necessary   If you were fasting, you may resume your normal diet and medications after the procedure  If you have diabetes, your blood sugar may be higher than normal for 10-14 days following a steroid injection. Contact your doctor who manages your diabetes if your blood sugar is significantly higher than usual    If you experience any  of the following, call Sports Medicine @ [phone]  -Fever over 100 degrees F  -Swelling, bleeding, redness, drainage, warmth at the injection site  -New or significant worsening pain     Rotator Cuff: Exercises  Introduction  Here are some examples of exercises for you to try to help with your rotator cuff tendonitis. The exercises may be suggested for a condition or for rehabilitation. Start each exercise slowly. Ease off the exercises if you start to have pain. You will be told when to start these exercises and which ones will work best for you.    How to do the exercises    Pendulum swing- Start with this exercise first    If you have pain in your back, do not do this exercise.  Hold on to a table or the back of a chair with your good arm. Then bend forward a little and let your sore arm hang straight down. This exercise does not use the arm muscles. Rather, use your legs and your hips to create movement that makes your arm swing freely.  Use the movement from your hips and legs to guide the slightly swinging arm back and forth like a pendulum (or elephant trunk). Then guide it in circles that start small (about the size of a dinner plate). Make the circles a bit larger each day, as your pain allows.  Do this exercise for 10 swings in each direction (clockwise and counter-clockwise), 3-4 times each day.  As you have less pain, try bending over a little farther to do this exercise. This will increase the amount of movement at your shoulder.      Shoulder blade squeeze    Stand with your arms at your sides, and squeeze your shoulder blades together. Do not raise your shoulders up as you squeeze.  Hold 6 seconds.  Repeat 8 to 12 times    Scapular exercise: Arm reach    Lie flat on your back. This exercise is a very slight motion that starts with your arms raised (elbows straight, arms straight).  From this position, reach higher toward the russ or ceiling. Keep your elbows straight. All motion should be from your  "shoulder blade only.  Relax your arms back to where you started.  Repeat 8 to 12 times.    Arm raise to the side    During this strengthening exercise, your arm should stay about 30 degrees to the front of your side.  Slowly raise your injured arm to the side, with your thumb facing up. Raise your arm 60 degrees at the most (shoulder level is 90 degrees).  Hold the position for 3 to 5 seconds. Then lower your arm back to your side. If you need to, bring your \"good\" arm across your body and place it under the elbow as you lower your injured arm. Use your good arm to keep your injured arm from dropping down too fast.  Repeat 8 to 12 times.  When you first start out, don't hold any extra weight in your hand. As you get stronger, you may use a 1-pound to 2-pound dumbbell or a small can of food.    Shoulder flexor and extensor exercise    These are isometric exercises. That means you contract your muscles without actually moving.  Push forward (flex): Stand facing a wall or doorjamb, about 6 inches or less back. Hold your injured arm against your body. Make a closed fist with your thumb on top. Then gently push your hand forward into the wall with about 25% to 50% of your strength. Don't let your body move backward as you push. Hold for about 6 seconds. Relax for a few seconds. Repeat 8 to 12 times.  Push backward (extend): Stand with your back flat against a wall. Your upper arm should be against the wall, with your elbow bent 90 degrees (your hand straight ahead). Push your elbow gently back against the wall with about 25% to 50% of your strength. Don't let your body move forward as you push. Hold for about 6 seconds. Relax for a few seconds. Repeat 8 to 12 times.    Scapular exercise: Wall push-ups    This exercise is best done with your fingers somewhat turned out, rather than straight up and down.  Stand facing a wall, about 12 inches to 18 inches away.  Place your hands on the wall at shoulder height.  Slowly bend " your elbows and bring your face to the wall. Keep your back and hips straight.  Push back to where you started.  Repeat 8 to 12 times.  When you can do this exercise against a wall comfortably, you can try it against a counter. You can then slowly progress to the end of a couch, then to a sturdy chair, and finally to the floor.    Scapular exercise: Retraction    For this exercise, you will need elastic exercise material, such as surgical tubing or Thera-Band.  Put the band around a solid object at about waist level. (A bedpost will work well.) Each hand should hold an end of the band.  With your elbows at your sides and bent to 90 degrees, pull the band back. Your shoulder blades should move toward each other. Then move your arms back where you started.  Repeat 8 to 12 times.  If you have good range of motion in your shoulders, try this exercise with your arms lifted out to the sides. Keep your elbows at a 90-degree angle. Raise the elastic band up to about shoulder level. Pull the band back to move your shoulder blades toward each other. Then move your arms back where you started.    Internal rotator strengthening exercise    Start by tying a piece of elastic exercise material to a doorknob. You can use surgical tubing or Thera-Band.  Stand or sit with your shoulder relaxed and your elbow bent 90 degrees. Your upper arm should rest comfortably against your side. Squeeze a rolled towel between your elbow and your body for comfort. This will help keep your arm at your side.  Hold one end of the elastic band in the hand of the painful arm.  Slowly rotate your forearm toward your body until it touches your belly. Slowly move it back to where you started.  Keep your elbow and upper arm firmly tucked against the towel roll or at your side.  Repeat 8 to 12 times.    External rotator strengthening exercise    Start by tying a piece of elastic exercise material to a doorknob. You can use surgical tubing or Thera-Band. (You  may also hold one end of the band in each hand.)  Stand or sit with your shoulder relaxed and your elbow bent 90 degrees. Your upper arm should rest comfortably against your side. Squeeze a rolled towel between your elbow and your body for comfort. This will help keep your arm at your side.  Hold one end of the elastic band with the hand of the painful arm.  Start with your forearm across your belly. Slowly rotate the forearm out away from your body. Keep your elbow and upper arm tucked against the towel roll or the side of your body until you begin to feel tightness in your shoulder. Slowly move your arm back to where you started.  Repeat 8 to 12 times.    Follow-up care is a key part of your treatment and safety. Be sure to make and go to all appointments, and call your doctor if you are having problems. It's also a good idea to know your test results and keep a list of the medicines you take.    Current as of: November 9, 2022               Content Version: 13.7    9196-1313 [employer].   Care instructions adapted under license by your healthcare professional. If you have questions about a medical condition or this instruction, always ask your healthcare professional. [employer] disclaims any warranty or liability for your use of this information.

## 2025-01-27 DIAGNOSIS — I10 HYPERTENSION GOAL BP (BLOOD PRESSURE) < 140/90: ICD-10-CM

## 2025-01-27 DIAGNOSIS — I10 ESSENTIAL HYPERTENSION: ICD-10-CM

## 2025-01-27 RX ORDER — METOPROLOL SUCCINATE 100 MG/1
100 TABLET, EXTENDED RELEASE ORAL DAILY
Qty: 90 TABLET | Refills: 0 | Status: SHIPPED | OUTPATIENT
Start: 2025-01-27

## 2025-01-27 RX ORDER — HYDROCHLOROTHIAZIDE 12.5 MG/1
CAPSULE ORAL
Qty: 90 CAPSULE | Refills: 0 | Status: SHIPPED | OUTPATIENT
Start: 2025-01-27

## 2025-02-04 LAB — NONINV COLON CA DNA+OCC BLD SCRN STL QL: NEGATIVE

## 2025-02-19 ENCOUNTER — PATIENT OUTREACH (OUTPATIENT)
Dept: CARE COORDINATION | Facility: CLINIC | Age: 75
End: 2025-02-19
Payer: COMMERCIAL

## 2025-03-04 DIAGNOSIS — G25.81 RESTLESS LEG SYNDROME: ICD-10-CM

## 2025-03-05 ENCOUNTER — PATIENT OUTREACH (OUTPATIENT)
Dept: CARE COORDINATION | Facility: CLINIC | Age: 75
End: 2025-03-05
Payer: COMMERCIAL

## 2025-03-10 RX ORDER — PRAMIPEXOLE DIHYDROCHLORIDE 0.12 MG/1
TABLET ORAL
Qty: 180 TABLET | Refills: 1 | Status: SHIPPED | OUTPATIENT
Start: 2025-03-10

## 2025-03-10 NOTE — TELEPHONE ENCOUNTER
Pt called back, scheduled first available appt, 5/14/25.  Pt asking for refills as needed until this appt

## 2025-03-12 DIAGNOSIS — I10 ESSENTIAL HYPERTENSION: ICD-10-CM

## 2025-03-12 RX ORDER — AMLODIPINE BESYLATE 10 MG/1
TABLET ORAL
Qty: 90 TABLET | Refills: 11 | Status: SHIPPED | OUTPATIENT
Start: 2025-03-12

## 2025-04-11 NOTE — PROGRESS NOTES
Sports Medicine Procedure Note    Diagnoses and all orders for this visit:    Bilateral primary osteoarthritis of knee    Other orders  -     Large Joint Injection/Arthocentesis: bilateral knee        Andree Trujillo is a/an 74 year old female who is seen for Corticosteroid injection of bilateral knees.   Last injection: 12/2/25, provided approximately 3.5 - 4 months of relief.     Plan:  - Bilateral knee corticosteroid injections performed in clinic today  - Consider bracing or therapy in the future  - Can repeat no sooner than every 3 months  - Follow-up as needed or for repeat injections  -Post-injection instructions were provided to the patient  -Follow-up: PRN      Post-Injection Discharge Instructions    You may shower, however avoid swimming, tub baths or hot tubs for 24 hours following your procedure  You may have a mild to moderate increase in pain for a few days following the injection.  The lidocaine (local numbing medicine) will wear off in several hours. It usually takes 3-5 days for the steroid medication to start working although it may take up to 14 days for full effect.   You may use ice packs for 10-15 minutes, 3 to 4 times a day at the injection site for comfort if needed  You may use extra strength Tylenol for pain control if necessary   If you were fasting, you may resume your normal diet and medications after the procedure  If you have diabetes, your blood sugar may be higher than normal for 10-14 days following a steroid injection. Contact your doctor who manages your diabetes if your blood sugar is significantly higher than usual    If you experience any of the following, call Sports Medicine @  [phone]  -Fever over 100 degrees F  -Swelling, bleeding, redness, drainage, warmth at the injection site  -New or significant worsening pain    Large Joint Injection/Arthocentesis: bilateral knee    Date/Time: 4/14/2025 10:03 AM    Performed by: Mana Monge DO  Authorized by: Saleem  Mana Castañeda DO    Indications:  Pain  Needle Size:  25 G  Guidance: ultrasound    Approach:  Anterolateral  Location:  Knee  Laterality:  Bilateral      Medications (Right):  40 mg triamcinolone 40 MG/ML; 3 mL lidocaine 1 %; 4 mL ROPivacaine 5 MG/ML  Medications (Left):  40 mg triamcinolone 40 MG/ML; 3 mL lidocaine 1 %; 4 mL ROPivacaine 5 MG/ML  Outcome:  Tolerated well, no immediate complications  Procedure discussed: discussed risks, benefits, and alternatives    Consent Given by:  Patient  Timeout: timeout called immediately prior to procedure    Prep: patient was prepped and draped in usual sterile fashion     Ultrasound was used to ensure safe and accurate needle placement and injection. Ultrasound images of the procedure were permanently stored.  1ml of 8.4% Sodium Bicarbonate solution was used to buffer the local numbing agent for today's injection          Dr. Mana Monge DO  [facility]  Sports Medicine     -----

## 2025-04-14 ENCOUNTER — OFFICE VISIT (OUTPATIENT)
Dept: ORTHOPEDICS | Facility: CLINIC | Age: 75
End: 2025-04-14
Payer: COMMERCIAL

## 2025-04-14 DIAGNOSIS — M17.0 BILATERAL PRIMARY OSTEOARTHRITIS OF KNEE: Primary | ICD-10-CM

## 2025-04-14 PROCEDURE — 20611 DRAIN/INJ JOINT/BURSA W/US: CPT | Mod: 50 | Performed by: STUDENT IN AN ORGANIZED HEALTH CARE EDUCATION/TRAINING PROGRAM

## 2025-04-14 PROCEDURE — 99207 PR NO BILLABLE SERVICE THIS VISIT: CPT | Performed by: STUDENT IN AN ORGANIZED HEALTH CARE EDUCATION/TRAINING PROGRAM

## 2025-04-14 RX ORDER — LIDOCAINE HYDROCHLORIDE 10 MG/ML
3 INJECTION, SOLUTION INFILTRATION; PERINEURAL
Status: COMPLETED | OUTPATIENT
Start: 2025-04-14 | End: 2025-04-14

## 2025-04-14 RX ORDER — TRIAMCINOLONE ACETONIDE 40 MG/ML
40 INJECTION, SUSPENSION INTRA-ARTICULAR; INTRAMUSCULAR
Status: COMPLETED | OUTPATIENT
Start: 2025-04-14 | End: 2025-04-14

## 2025-04-14 RX ORDER — ROPIVACAINE HYDROCHLORIDE 5 MG/ML
4 INJECTION, SOLUTION EPIDURAL; INFILTRATION; PERINEURAL
Status: COMPLETED | OUTPATIENT
Start: 2025-04-14 | End: 2025-04-14

## 2025-04-14 RX ADMIN — TRIAMCINOLONE ACETONIDE 40 MG: 40 INJECTION, SUSPENSION INTRA-ARTICULAR; INTRAMUSCULAR at 10:03

## 2025-04-14 RX ADMIN — ROPIVACAINE HYDROCHLORIDE 4 ML: 5 INJECTION, SOLUTION EPIDURAL; INFILTRATION; PERINEURAL at 10:03

## 2025-04-14 RX ADMIN — LIDOCAINE HYDROCHLORIDE 3 ML: 10 INJECTION, SOLUTION INFILTRATION; PERINEURAL at 10:03

## 2025-04-14 NOTE — LETTER
4/14/2025      Andree Trujillo  [address] [facility] [address]      Dear Colleague,    Thank you for referring your patient, Andree Trujillo, to the [facility]. Please see a copy of my visit note below.    Sports Medicine Procedure Note    Diagnoses and all orders for this visit:    Bilateral primary osteoarthritis of knee    Other orders  -     Large Joint Injection/Arthocentesis: bilateral knee        Andree Trujillo is a/an 74 year old female who is seen for Corticosteroid injection of bilateral knees.   Last injection: 12/2/25, provided approximately 3.5 - 4 months of relief.     Plan:  - Bilateral knee corticosteroid injections performed in clinic today  - Consider bracing or therapy in the future  - Can repeat no sooner than every 3 months  - Follow-up as needed or for repeat injections  -Post-injection instructions were provided to the patient  -Follow-up: PRN      Post-Injection Discharge Instructions    You may shower, however avoid swimming, tub baths or hot tubs for 24 hours following your procedure  You may have a mild to moderate increase in pain for a few days following the injection.  The lidocaine (local numbing medicine) will wear off in several hours. It usually takes 3-5 days for the steroid medication to start working although it may take up to 14 days for full effect.   You may use ice packs for 10-15 minutes, 3 to 4 times a day at the injection site for comfort if needed  You may use extra strength Tylenol for pain control if necessary   If you were fasting, you may resume your normal diet and medications after the procedure  If you have diabetes, your blood sugar may be higher than normal for 10-14 days following a steroid injection. Contact your doctor who manages your diabetes if your blood sugar is significantly higher than usual    If you experience any of the following, call Sports Medicine @  [phone]  -Fever over 100 degrees  F  -Swelling, bleeding, redness, drainage, warmth at the injection site  -New or significant worsening pain    Large Joint Injection/Arthocentesis: bilateral knee    Date/Time: 4/14/2025 10:03 AM    Performed by: Mana Monge DO  Authorized by: Mana Monge DO    Indications:  Pain  Needle Size:  25 G  Guidance: ultrasound    Approach:  Anterolateral  Location:  Knee  Laterality:  Bilateral      Medications (Right):  40 mg triamcinolone 40 MG/ML; 3 mL lidocaine 1 %; 4 mL ROPivacaine 5 MG/ML  Medications (Left):  40 mg triamcinolone 40 MG/ML; 3 mL lidocaine 1 %; 4 mL ROPivacaine 5 MG/ML  Outcome:  Tolerated well, no immediate complications  Procedure discussed: discussed risks, benefits, and alternatives    Consent Given by:  Patient  Timeout: timeout called immediately prior to procedure    Prep: patient was prepped and draped in usual sterile fashion     Ultrasound was used to ensure safe and accurate needle placement and injection. Ultrasound images of the procedure were permanently stored.  1ml of 8.4% Sodium Bicarbonate solution was used to buffer the local numbing agent for today's injection          Dr. Mana Monge DO  [facility]  Sports Medicine     -----      Again, thank you for allowing me to participate in the care of your patient.        Sincerely,        Mana Monge DO    Electronically signed

## 2025-04-14 NOTE — PATIENT INSTRUCTIONS
1. Bilateral primary osteoarthritis of knee        Plan:  - Bilateral knee corticosteroid injections performed in clinic today  - Consider bracing or therapy in the future  - Can repeat no sooner than every 3 months  - Follow-up as needed or for repeat injections    If you had imaging performed/ordered at your appointment today, you can expect to see your radiology results in [employer] within approximately 48 business hours.     If you have questions/concerns after your appointment, please send my team a [employer] message or call the clinic at [phone].     Dr. Mana Monge, DO, [facility]  Sports Medicine and Orthopedics    Dr. Monge's Clinic Locations and Contact Numbers:   [place] APPOINTMENTS: [phone]      [address] [facility] RADIOLOGY: [phone]   [place] [address] PHYSICAL THERAPY: [phone]    HAND THERAPY/OT: [phone]   [place] BILLING QUESTIONS: [phone] [address] FAX: [phone]   [place] [address]       Post-Injection Discharge Instructions    You may shower, however avoid swimming, tub baths or hot tubs for 24 hours following your procedure  You may have a mild to moderate increase in pain for a few days following the injection.  The lidocaine (local numbing medicine) will wear off in several hours. It usually takes 3-5 days for the steroid medication to start working although it may take up to 14 days for full effect.   You may use ice packs for 10-15 minutes, 3 to 4 times a day at the injection site for comfort if needed  You may use extra strength Tylenol for pain control if necessary   If you were fasting, you may resume your normal diet and medications after the procedure  If you have diabetes, your blood sugar may be higher than normal for 10-14 days following a steroid injection. Contact your doctor who manages your diabetes if your blood sugar is significantly higher than usual    If you experience any of the following, call Sports Medicine   [phone]  -Fever over 100 degrees F  -Swelling, bleeding, redness, drainage, warmth at the injection site  -New or significant worsening pain

## 2025-04-26 DIAGNOSIS — I10 ESSENTIAL HYPERTENSION: ICD-10-CM

## 2025-04-26 DIAGNOSIS — I10 HYPERTENSION GOAL BP (BLOOD PRESSURE) < 140/90: ICD-10-CM

## 2025-04-28 RX ORDER — METOPROLOL SUCCINATE 100 MG/1
100 TABLET, EXTENDED RELEASE ORAL DAILY
Qty: 90 TABLET | Refills: 0 | Status: SHIPPED | OUTPATIENT
Start: 2025-04-28

## 2025-04-28 RX ORDER — HYDROCHLOROTHIAZIDE 12.5 MG/1
CAPSULE ORAL
Qty: 90 CAPSULE | Refills: 0 | Status: SHIPPED | OUTPATIENT
Start: 2025-04-28

## 2025-05-12 DIAGNOSIS — M51.369 DDD (DEGENERATIVE DISC DISEASE), LUMBAR: ICD-10-CM

## 2025-05-12 RX ORDER — GABAPENTIN 300 MG/1
900 CAPSULE ORAL 3 TIMES DAILY
Qty: 810 CAPSULE | Refills: 11 | Status: SHIPPED | OUTPATIENT
Start: 2025-05-12

## 2025-05-14 ENCOUNTER — OFFICE VISIT (OUTPATIENT)
Dept: FAMILY MEDICINE | Facility: CLINIC | Age: 75
End: 2025-05-14
Payer: COMMERCIAL

## 2025-05-14 ENCOUNTER — ANCILLARY PROCEDURE (OUTPATIENT)
Dept: GENERAL RADIOLOGY | Facility: CLINIC | Age: 75
End: 2025-05-14
Attending: INTERNAL MEDICINE
Payer: COMMERCIAL

## 2025-05-14 VITALS
WEIGHT: 190.9 LBS | BODY MASS INDEX: 31.81 KG/M2 | TEMPERATURE: 98.6 F | DIASTOLIC BLOOD PRESSURE: 78 MMHG | HEART RATE: 66 BPM | OXYGEN SATURATION: 96 % | RESPIRATION RATE: 16 BRPM | SYSTOLIC BLOOD PRESSURE: 138 MMHG | HEIGHT: 65 IN

## 2025-05-14 DIAGNOSIS — Z13.6 SCREENING FOR CARDIOVASCULAR CONDITION: ICD-10-CM

## 2025-05-14 DIAGNOSIS — E55.9 VITAMIN D DEFICIENCY: ICD-10-CM

## 2025-05-14 DIAGNOSIS — E78.5 HYPERLIPIDEMIA LDL GOAL <100: ICD-10-CM

## 2025-05-14 DIAGNOSIS — Z82.49 FAMILY HISTORY OF ISCHEMIC HEART DISEASE: ICD-10-CM

## 2025-05-14 DIAGNOSIS — E78.00 HYPERCHOLESTEROLEMIA: ICD-10-CM

## 2025-05-14 DIAGNOSIS — Z12.31 ENCOUNTER FOR SCREENING MAMMOGRAM FOR BREAST CANCER: ICD-10-CM

## 2025-05-14 DIAGNOSIS — M25.50 POLYARTHRALGIA: ICD-10-CM

## 2025-05-14 DIAGNOSIS — I10 HYPERTENSION GOAL BP (BLOOD PRESSURE) < 140/90: ICD-10-CM

## 2025-05-14 DIAGNOSIS — R73.03 PREDIABETES: ICD-10-CM

## 2025-05-14 DIAGNOSIS — M85.852 OSTEOPENIA OF BOTH HIPS: ICD-10-CM

## 2025-05-14 DIAGNOSIS — M85.851 OSTEOPENIA OF BOTH HIPS: ICD-10-CM

## 2025-05-14 DIAGNOSIS — M25.511 ACUTE PAIN OF RIGHT SHOULDER: ICD-10-CM

## 2025-05-14 DIAGNOSIS — G25.81 RESTLESS LEG SYNDROME: ICD-10-CM

## 2025-05-14 DIAGNOSIS — I10 ESSENTIAL HYPERTENSION: Primary | ICD-10-CM

## 2025-05-14 DIAGNOSIS — M25.512 ACUTE PAIN OF LEFT SHOULDER: ICD-10-CM

## 2025-05-14 DIAGNOSIS — H61.23 BILATERAL IMPACTED CERUMEN: ICD-10-CM

## 2025-05-14 DIAGNOSIS — Z00.00 ENCOUNTER FOR MEDICARE ANNUAL WELLNESS EXAM: ICD-10-CM

## 2025-05-14 LAB
ERYTHROCYTE [SEDIMENTATION RATE] IN BLOOD BY WESTERGREN METHOD: 16 MM/HR (ref 0–30)
EST. AVERAGE GLUCOSE BLD GHB EST-MCNC: 117 MG/DL
HBA1C MFR BLD: 5.7 % (ref 0–5.6)

## 2025-05-14 PROCEDURE — 83036 HEMOGLOBIN GLYCOSYLATED A1C: CPT | Performed by: INTERNAL MEDICINE

## 2025-05-14 PROCEDURE — 73030 X-RAY EXAM OF SHOULDER: CPT | Mod: TC | Performed by: RADIOLOGY

## 2025-05-14 PROCEDURE — 36415 COLL VENOUS BLD VENIPUNCTURE: CPT | Performed by: INTERNAL MEDICINE

## 2025-05-14 PROCEDURE — 85652 RBC SED RATE AUTOMATED: CPT | Performed by: INTERNAL MEDICINE

## 2025-05-14 RX ORDER — ATORVASTATIN CALCIUM 10 MG/1
10 TABLET, FILM COATED ORAL DAILY
Qty: 90 TABLET | Refills: 11 | Status: SHIPPED | OUTPATIENT
Start: 2025-05-14

## 2025-05-14 RX ORDER — HYDROCHLOROTHIAZIDE 12.5 MG/1
CAPSULE ORAL
Qty: 90 CAPSULE | Refills: 11 | Status: SHIPPED | OUTPATIENT
Start: 2025-05-14

## 2025-05-14 RX ORDER — PRAMIPEXOLE DIHYDROCHLORIDE 0.12 MG/1
.125-.25 TABLET ORAL AT BEDTIME
Qty: 180 TABLET | Refills: 11 | Status: SHIPPED | OUTPATIENT
Start: 2025-05-14

## 2025-05-14 RX ORDER — CELECOXIB 100 MG/1
100 CAPSULE ORAL 2 TIMES DAILY
Qty: 180 CAPSULE | Refills: 3 | Status: SHIPPED | OUTPATIENT
Start: 2025-05-14

## 2025-05-14 RX ORDER — METOPROLOL SUCCINATE 100 MG/1
100 TABLET, EXTENDED RELEASE ORAL DAILY
Qty: 90 TABLET | Refills: 11 | Status: SHIPPED | OUTPATIENT
Start: 2025-05-14

## 2025-05-14 NOTE — PROGRESS NOTES
Assessment & Plan     Essential hypertension:  - Blood pressure today is 138/78, which is within the target range.  - Monitor blood pressure, especially after switching from Aleve to Celebrex, as it may help with blood pressure by reducing sodium intake.    Screening for cardiovascular condition:  - Family history of ischemic heart disease noted.  - Order CT calcium score to assess plaque deposition in coronary arteries. Check lipoprotein A and cholesterol levels.    Polyarthralgia- will get labs and consider evaluation by rheumatology with multiple joint affected- bilateral shoulders, hip, knees    Hypercholesterolemia:  - Check cholesterol levels as part of cardiovascular screening.    Hyperlipidemia LDL goal <100:  LDL Cholesterol Calculated   Date Value Ref Range Status   03/21/2024 66 <=100 mg/dL Final   12/02/2020 75 <100 mg/dL Final     Comment:     Desirable:       <100 mg/dl     - Monitor cholesterol levels to guide atorvastatin dosage.    Hypertension goal BP < 140/90:  - Current blood pressure is within goal.  - Continue monitoring blood pressure.    Polyarthralgia:  - Multiple joint pain possibly due to osteoarthritis; differential diagnosis includes rheumatoid arthritis or chronic gout.  - Conduct lab tests to rule out other causes like rheumatoid arthritis or chronic gout. Trial of Celebrex for joint pain management.  - Risks and side effects: Avoid taking Aleve or ibuprofen with Celebrex to prevent doubling up on similar medications.    Family history of ischemic heart disease:  - Noted as a significant risk factor.  - Order CT calcium score and check lipoprotein A as part of cardiovascular screening.    Encounter for screening mammogram for breast cancer:  - Due for annual mammogram based on family history.  - Schedule mammogram along with CT calcium score.    Prediabetes:  Lab Results   Component Value Date    A1C 5.7 05/14/2025    A1C 5.7 03/21/2024    A1C 5.5 12/30/2022    A1C 5.7 09/22/2009  "    - Check A1c level to monitor blood sugar control.    Osteopenia of both hips:  - Previous DEXA scan showed osteopenia below treatment threshold.  - Repeat DEXA scan to assess bone strength.    Encounter for Medicare annual wellness exam:  - Comprehensive review conducted during the visit.  - Follow-up appointment scheduled for October 1st to reassess joint pain and overall health status.    Bilateral impacted cerumen: washed out by MA using water  - Cerumen impaction noted, affecting hearing in crowds.  - Perform ear wash; use Debrox regularly if needed to manage earwax buildup.    Bilateral shoulder pain- xrays obtained today.    This note was formulated using [employer] scribe technology and may have unintentional omissions.    Consent was obtained from the patient to use an AI documentation tool in the creation of this note.          BMI  Estimated body mass index is 32.26 kg/m  as calculated from the following:    Height as of this encounter: 1.638 m (5' 4.5\").    Weight as of this encounter: 86.6 kg (190 lb 14.4 oz).   Weight management plan: Discussed healthy diet and exercise guidelines          Terri Cervantes is a 74 year old, presenting for the following health issues:  Recheck Medication, Ear Problem (Wax concerns), Shoulder Pain (-both- [id number]), and Lab Result Notice ([place] results)        5/14/2025    11:07 AM   Additional Questions   Roomed by jose     Ear Problem    Shoulder Pain    History of Present Illness       Reason for visit:  Taryn valdes   She is taking medications regularly.   Helen Trujillo, age: 74 years    Arthritis and Joint Pain  - Reports discomfort and need for knee surgery, which has not been done yet  - Has arthritis affecting multiple joints, including shoulders and thumbs  - Shoulder pain worsened after a cortisone shot in December, now aching again  - Thumbs ache, with symptoms worse in the morning and improving throughout the day  - Family history of arthritis, with both " parents affected    Blood Pressure Concerns  - Believes blood pressure might still be running high  - Does not check blood pressure at home due to previous high readings    Hearing Issues  - Reports difficulty hearing in crowds but is fine in one-on-one situations    Sleep and Restless Legs  - Never been a good sleeper, but reports improvement compared to the past  - Experiences bad nights occasionally  - Restless legs not specifically mentioned as a current issue    Memory Concerns  - Reports memory is not as good as it used to be    Family History of Heart Disease  - Two cousins in their 60s have had bypass surgery  - Brother recently  from a sudden heart attack  - Family history of lipoprotein A, a genetic condition    Cologuard Test  - Cologuard test was sent in three months ago and was negative    Earwax  - Earwax buildup mentioned, but no specific symptoms reported    Mood and Motivation  - Does not feel depressed but lacks motivation, attributing it to physical discomfort    Insect Bites  - Experienced itching from insect bites last week, which have since improved    Swelling  - Occasional ankle swelling, especially after standing, but resolves on its own    Alcohol Use  - Uses cannabis gummies for relaxation and sleep when traveling, as recommended by brother-in-law    Lifestyle Changes  - Used to shop frequently but now limits time in stores due to discomfort        Annual Wellness Visit     Patient has been advised of split billing requirements and indicates understanding: Yes          Health Care Directive  Patient does not have a Health Care Directive: Discussed advance care planning with patient; however, patient declined at this time.  In general, how would you rate your overall physical health? (!) POOR   Discussed with patient their rating of physical health; information has been provided.   Do you have a special diet?  Regular (no restrictions)        3/21/2024   Exercise, Social Connection,  Stress   Days per week of moderate/strenous exercise 0 days   Frequency of gathering with friends or relatives Three times   Feel stress (tense, anxious, or unable to sleep) To some exte     Do you see a dentist two times every year?  Yes  Have you been more tired than usual lately?  No  If you drink alcohol do you typically have >3 drinks per day or >7 drinks per week? No  Do you have a current opioid prescription? No  Do you use any other controlled substances or medications that are not prescribed by a provider? None  Social History     Tobacco Use    Smoking status: Former     Current packs/day: 0.00     Types: Cigarettes     Quit date: 1991     Years since quittin.3     Passive exposure: Never    Smokeless tobacco: Never   Vaping Use    Vaping status: Never Used   Substance Use Topics    Alcohol use: Yes     Comment: occ    Drug use: Never       Needs assistance for the following daily activities: no assistance needed  Which of the following safety concerns are present in your home?  none identified   Do you (or your family members) have any concerns about your safety while driving?  No  Do you have any of the following hearing concerns?: (!) TROUBLE UNDERSTANDING A SPEAKER AT A PUBLIC MEETING OR [employer] SERVICE  In the past 6 months, have you been bothered by leaking of urine? No    Normal clock and recall of 3 objects        3/21/2024   Social Factors   Frequency of gathering with friends or relatives Three times a week   Worry food won't last until get money to buy more No   Food not last or not have enough money for food? No   Do you have housing? (Housing is defined as stable permanent housing and does not include staying outside in a car, in a tent, in an abandoned building, in an overnight shelter, or couch-surfing.) Yes   Are you worried about losing your housing? No   Lack of transportation? No   Unable to get utilities (heat,electricity)? No          2025   Fall Risk   Fallen 2 or  more times in the past year? No   Trouble with walking or balance? Yes           Today's PHQ-2 Score:       5/14/2025    10:36 AM   PHQ-2 ( 1999 [employer])   Q1: Little interest or pleasure in doing things 1   Q2: Feeling down, depressed or hopeless 0   PHQ-2 Score 1    Q1: Little interest or pleasure in doing things Several days   Q2: Feeling down, depressed or hopeless Not at all   PHQ-2 Score 1       Patient-reported           1/11/2024   LAST FHS-7 RESULTS   1st degree relative breast or ovarian cancer Yes   Any relative bilateral breast cancer No   Any male have breast cancer No   Any ONE woman have BOTH breast AND ovarian cancer No   Any woman with breast cancer before 50yrs No   2 or more relatives with breast AND/OR ovarian cancer No   2 or more relatives with breast AND/OR bowel cancer No            ASCVD Risk   The 10-year ASCVD risk score (Sari ZAMORANO, et al., 2019) is: 16.9%    Values used to calculate the score:      Age: 74 years      Sex: Female      Is Non- : No      Diabetic: No      Tobacco smoker: No      Systolic Blood Pressure: 138 mmHg      Is BP treated: Yes      HDL Cholesterol: 66 mg/dL      Total Cholesterol: 168 mg/dL            Reviewed and updated as needed this visit by Provider                        Current providers sharing in care for this patient include:  Patient Care Team:  Golden Christensen MD as PCP - General (Internal Medicine - Pediatrics)  Golden Christensen MD as Assigned PCP  Sin Pope DO as Assigned Musculoskeletal Provider  Mana Monge DO as Assigned Neuroscience Provider    The following health maintenance items are reviewed in Epic and correct as of today:  Health Maintenance   Topic Date Due    COVID-19 Vaccine (5 - 2024-25 season) 09/01/2024    DEXA  01/13/2025    BMP  03/21/2025    LIPID  03/21/2025    INFLUENZA VACCINE (Season Ended) 09/01/2025    MAMMO SCREENING  01/11/2026    MEDICARE ANNUAL WELLNESS VISIT   "05/14/2026    ANNUAL REVIEW OF HM ORDERS  05/14/2026    FALL RISK ASSESSMENT  05/14/2026    RSV VACCINE (1 - 1-dose 75+ series) 10/08/2027    COLORECTAL CANCER SCREENING  01/28/2028    DIABETES SCREENING  05/14/2028    ADVANCE CARE PLANNING  05/14/2030    DTAP/TDAP/TD IMMUNIZATION (3 - Td or Tdap) 05/03/2033    HEPATITIS C SCREENING  Completed    PHQ-2 (once per calendar year)  Completed    Pneumococcal Vaccine: 50+ Years  Completed    ZOSTER IMMUNIZATION  Completed    HPV IMMUNIZATION  Aged Out    MENINGITIS IMMUNIZATION  Aged Out    LUNG CANCER SCREENING  Discontinued       Appropriate preventive services were discussed with this patient, including applicable screening as appropriate for fall prevention, nutrition, physical activity, Tobacco-use cessation, weight loss and cognition.  Checklist reviewing preventive services available has been given to the patient.          3/21/2024   Mini Cog   Clock Draw Score 2 Normal   3 Item Recall 3 objects recalled   Mini Cog Total Score 5                Review of Systems  Constitutional, HEENT, cardiovascular, pulmonary, gi and gu systems are negative, except as otherwise noted.      Objective    /78   Pulse 66   Temp 98.6  F (37  C) (Oral)   Resp 16   Ht 1.638 m (5' 4.5\")   Wt 86.6 kg (190 lb 14.4 oz)   SpO2 96%   BMI 32.26 kg/m    Body mass index is 32.26 kg/m .  Physical Exam   GENERAL: alert and no distress  EYES: Eyes grossly normal to inspection, PERRL and conjunctivae and sclerae normal  HENT: noted bilateral cerumen in canals, MMM without lesions  NECK: no adenopathy, no asymmetry, masses, or scars  RESP: lungs clear to auscultation - no rales, rhonchi or wheezes  CV: regular rate and rhythm, normal S1 S2, no S3 or S4, no murmur, click or rub, no peripheral edema  ABDOMEN: soft, nontender, no hepatosplenomegaly, no masses and bowel sounds normal  MS: decreased ROM of bilateral shoulders, slower gait, balances self for a couple of moments before " walking  PSYCH: mentation appears normal, affect normal/bright            The longitudinal plan of care for the diagnosis(es)/condition(s) as documented were addressed during this visit. Due to the added complexity in care, I will continue to support Helen in the subsequent management and with ongoing continuity of care.    Signed Electronically by: Golden Christensen MD

## 2025-05-14 NOTE — PATIENT INSTRUCTIONS
Labs today as we discussed.    Let's change to the celebrex to see if this helps with joint pain. Ok to use tylenol with this. Do not take with ibuprofen or aleve type products.    Other medications refilled.    Labs to look at joint inflammation today.    I would like your blood pressure to be less than 140/90, let me know if you are consistently getting numbers above this.     They will call to set up the heart scan as we discussed.     They will call to set up mammogram and the dexa scan (bone strength testing).    Let me know if issus are coming up.    Golden Christensen MD    Patient Education   Preventive Care Advice   This is general advice given by our system to help you stay healthy. However, your care team may have specific advice just for you. Please talk to your care team about your preventive care needs.  Nutrition  Eat 5 or more servings of fruits and vegetables each day.  Try wheat bread, brown rice and whole grain pasta (instead of white bread, rice, and pasta).  Get enough calcium and vitamin D. Check the label on foods and aim for 100% of the RDA (recommended daily allowance).  Lifestyle  Exercise at least 150 minutes each week  (30 minutes a day, 5 days a week).  Do muscle strengthening activities 2 days a week. These help control your weight and prevent disease.  No smoking.  Wear sunscreen to prevent skin cancer.  Have a dental exam and cleaning every 6 months.  Yearly exams  See your health care team every year to talk about:  Any changes in your health.  Any medicines your care team has prescribed.  Preventive care, family planning, and ways to prevent chronic diseases.  Shots (vaccines)   HPV shots (up to age 26), if you've never had them before.  Hepatitis B shots (up to age 59), if you've never had them before.  COVID-19 shot: Get this shot when it's due.  Flu shot: Get a flu shot every year.  Tetanus shot: Get a tetanus shot every 10 years.  Pneumococcal, hepatitis A, and RSV shots: Ask your care  team if you need these based on your risk.  Shingles shot (for age 50 and up)  General health tests  Diabetes screening:  Starting at age 35, Get screened for diabetes at least every 3 years.  If you are younger than age 35, ask your care team if you should be screened for diabetes.  Cholesterol test: At age 39, start having a cholesterol test every 5 years, or more often if advised.  Bone density scan (DEXA): At age 50, ask your care team if you should have this scan for osteoporosis (brittle bones).  Hepatitis C: Get tested at least once in your life.  STIs (sexually transmitted infections)  Before age 24: Ask your care team if you should be screened for STIs.  After age 24: Get screened for STIs if you're at risk. You are at risk for STIs (including HIV) if:  You are sexually active with more than one person.  You don't use condoms every time.  You or a partner was diagnosed with a sexually transmitted infection.  If you are at risk for HIV, ask about PrEP medicine to prevent HIV.  Get tested for HIV at least once in your life, whether you are at risk for HIV or not.  Cancer screening tests  Cervical cancer screening: If you have a cervix, begin getting regular cervical cancer screening tests starting at age 21.  Breast cancer scan (mammogram): If you've ever had breasts, begin having regular mammograms starting at age 40. This is a scan to check for breast cancer.  Colon cancer screening: It is important to start screening for colon cancer at age 45.  Have a colonoscopy test every 10 years (or more often if you're at risk) Or, ask your provider about stool tests like a FIT test every year or Cologuard test every 3 years.  To learn more about your testing options, visit:   .  For help making a decision, visit:   https://bit.ly/zn37206.  Prostate cancer screening test: If you have a prostate, ask your care team if a prostate cancer screening test (PSA) at age 55 is right for you.  Lung cancer screening: If you are  a current or former smoker ages 50 to 80, ask your care team if ongoing lung cancer screenings are right for you.  For informational purposes only. Not to replace the advice of your health care provider. Copyright   2023 [facility] [employer]. All rights reserved. Clinically reviewed by the [facility] Transitions Program. [employer] [id number] - REV 01/24.  Preventing Falls: Care Instructions  Injuries and health problems such as trouble walking or poor eyesight can increase your risk of falling. So can some medicines. But there are things you can do to help prevent falls. You can exercise to get stronger. You can also arrange your home to make it safer.    Talk to your doctor about the medicines you take. Ask if any of them increase the risk of falls and whether they can be changed or stopped.   Try to exercise regularly. It can help improve your strength and balance. This can help lower your risk of falling.         Practice fall safety and prevention.   Wear low-heeled shoes that fit well and give your feet good support. Talk to your doctor if you have foot problems that make this hard.  Carry a cellphone or wear a medical alert device that you can use to call for help.  Use stepladders instead of chairs to reach high objects. Don't climb if you're at risk for falls. Ask for help, if needed.  Wear the correct eyeglasses, if you need them.        Make your home safer.   Remove rugs, cords, clutter, and furniture from walkways.  Keep your house well lit. Use night-lights in hallways and bathrooms.  Install and use sturdy handrails on stairways.  Wear nonskid footwear, even inside. Don't walk barefoot or in socks without shoes.        Be safe outside.   Use handrails, curb cuts, and ramps whenever possible.  Keep your hands free by using a shoulder bag or backpack.  Try to walk in well-lit areas. Watch out for uneven ground, changes in pavement, and debris.  Be careful in the winter. Walk on the grass or  "gravel when sidewalks are slippery. Use de-icer on steps and walkways. Add non-slip devices to shoes.    Put grab bars and nonskid mats in your shower or tub and near the toilet. Try to use a shower chair or bath bench when bathing.   Get into a tub or shower by putting in your weaker leg first. Get out with your strong side first. Have a phone or medical alert device in the bathroom with you.   Where can you learn more?  Go to https://www.[employer].net/patiented  Enter G117 in the search box to learn more about \"Preventing Falls: Care Instructions.\"  Current as of: July 31, 2024  Content Version: 14.4    4434-6104 [employer].   Care instructions adapted under license by your healthcare professional. If you have questions about a medical condition or this instruction, always ask your healthcare professional. [employer] disclaims any warranty or liability for your use of this information.       "

## 2025-05-15 ENCOUNTER — PATIENT OUTREACH (OUTPATIENT)
Dept: CARE COORDINATION | Facility: CLINIC | Age: 75
End: 2025-05-15
Payer: COMMERCIAL

## 2025-05-15 ENCOUNTER — RESULTS FOLLOW-UP (OUTPATIENT)
Dept: FAMILY MEDICINE | Facility: CLINIC | Age: 75
End: 2025-05-15

## 2025-05-15 LAB
ALBUMIN SERPL BCG-MCNC: 4.2 G/DL (ref 3.5–5.2)
ALP SERPL-CCNC: 124 U/L (ref 40–150)
ALT SERPL W P-5'-P-CCNC: 32 U/L (ref 0–50)
ANA SER QL IF: NEGATIVE
ANION GAP SERPL CALCULATED.3IONS-SCNC: 12 MMOL/L (ref 7–15)
APO A-I SERPL-MCNC: 37 MG/DL
AST SERPL W P-5'-P-CCNC: 24 U/L (ref 0–45)
BILIRUB SERPL-MCNC: 0.3 MG/DL
BUN SERPL-MCNC: 14.4 MG/DL (ref 8–23)
CALCIUM SERPL-MCNC: 10 MG/DL (ref 8.8–10.4)
CHLORIDE SERPL-SCNC: 98 MMOL/L (ref 98–107)
CHOLEST SERPL-MCNC: 152 MG/DL
CREAT SERPL-MCNC: 0.85 MG/DL (ref 0.51–0.95)
EGFRCR SERPLBLD CKD-EPI 2021: 72 ML/MIN/1.73M2
FASTING STATUS PATIENT QL REPORTED: ABNORMAL
FASTING STATUS PATIENT QL REPORTED: NORMAL
GLUCOSE SERPL-MCNC: 105 MG/DL (ref 70–99)
HCO3 SERPL-SCNC: 27 MMOL/L (ref 22–29)
HDLC SERPL-MCNC: 74 MG/DL
LDLC SERPL CALC-MCNC: 62 MG/DL
NONHDLC SERPL-MCNC: 78 MG/DL
POTASSIUM SERPL-SCNC: 4.1 MMOL/L (ref 3.4–5.3)
PROT SERPL-MCNC: 7.3 G/DL (ref 6.4–8.3)
RHEUMATOID FACT SERPL-ACNC: <10 IU/ML
SODIUM SERPL-SCNC: 137 MMOL/L (ref 135–145)
TRIGL SERPL-MCNC: 79 MG/DL
URATE SERPL-MCNC: 4.2 MG/DL (ref 2.4–5.7)
VIT D+METAB SERPL-MCNC: 32 NG/ML (ref 20–50)

## 2025-05-16 ENCOUNTER — RESULTS FOLLOW-UP (OUTPATIENT)
Dept: FAMILY MEDICINE | Facility: CLINIC | Age: 75
End: 2025-05-16

## 2025-05-17 LAB — CCP AB SER IA-ACNC: 0.9 U/ML

## 2025-06-18 ENCOUNTER — TELEPHONE (OUTPATIENT)
Dept: PHYSICAL MEDICINE AND REHAB | Facility: CLINIC | Age: 75
End: 2025-06-18

## 2025-06-18 ENCOUNTER — OFFICE VISIT (OUTPATIENT)
Dept: PHYSICAL MEDICINE AND REHAB | Facility: CLINIC | Age: 75
End: 2025-06-18
Payer: COMMERCIAL

## 2025-06-18 VITALS
HEART RATE: 78 BPM | SYSTOLIC BLOOD PRESSURE: 137 MMHG | BODY MASS INDEX: 32.15 KG/M2 | DIASTOLIC BLOOD PRESSURE: 76 MMHG | HEIGHT: 65 IN | WEIGHT: 193 LBS

## 2025-06-18 DIAGNOSIS — M48.061 LUMBAR FORAMINAL STENOSIS: ICD-10-CM

## 2025-06-18 DIAGNOSIS — M48.062 SPINAL STENOSIS OF LUMBAR REGION WITH NEUROGENIC CLAUDICATION: Primary | ICD-10-CM

## 2025-06-18 DIAGNOSIS — M47.816 LUMBAR FACET ARTHROPATHY: ICD-10-CM

## 2025-06-18 PROCEDURE — 3075F SYST BP GE 130 - 139MM HG: CPT | Performed by: NURSE PRACTITIONER

## 2025-06-18 PROCEDURE — 3078F DIAST BP <80 MM HG: CPT | Performed by: NURSE PRACTITIONER

## 2025-06-18 PROCEDURE — 1125F AMNT PAIN NOTED PAIN PRSNT: CPT | Performed by: NURSE PRACTITIONER

## 2025-06-18 PROCEDURE — 99214 OFFICE O/P EST MOD 30 MIN: CPT | Performed by: NURSE PRACTITIONER

## 2025-06-18 ASSESSMENT — PAIN SCALES - GENERAL: PAINLEVEL_OUTOF10: MODERATE PAIN (5)

## 2025-06-18 NOTE — LETTER
6/18/2025      Andree Trujillo  [address]  [facility], [place] [address]      Dear Colleague,    Thank you for referring your patient, Andree Trujillo, to the [facility]. Please see a copy of my visit note below.      Assessment:     Diagnoses and all orders for this visit:  Spinal stenosis of lumbar region with neurogenic claudication  -     PAIN Transforaminal SAI Inj Lumbosacral [place]; Future  Lumbar foraminal stenosis  -     PAIN Transforaminal SAI Inj Lumbosacral [place]; Future  Lumbar facet arthropathy     Andree Trujillo is a 74 year old y.o. female with past medical history significant for  vitamin D deficiency, hypercholesterolemia, ovarian cyst, hypertension, osteopenia, restless leg syndrome, spinal stenosis who presents today for follow-up regarding:    - Chronic bilateral low back pain with standing and walking with consistency with neurogenic claudication. Last injection 2024.     Plan:     A shared decision making plan was used. The patient's values and choices were respected. Prior medical records were reviewed today. The following represents what was discussed and decided upon by the provider and the patient.        -DIAGNOSTIC TESTS: Images were personally reviewed and interpreted.   - Lumbar spine MRI 4/3/2024 with multilevel degenerative changes with facet arthropathy.  L3-4 moderate to severe spinal stenosis with right greater than left foraminal stenosis.  Mild spinal stenosis with mild bilateral foraminal stenosis.  L4-5 mild bilateral foraminal stenosis.  L5-S1 mild subarticular stenosis.     -INTERVENTIONS: Order placed for repeat bilateral L3-4 transforaminal epidural steroid injection which she did get significant relief with previously for over 9 months.  She does have central and bilateral foraminal stenosis at the L3-4 level    -MEDICATIONS: No changes in medications today.  Discussed side effects of medications and proper use. Patient verbalized  understanding.    -PHYSICAL THERAPY: Did discuss and stressed the importance of continuing a daily home exercise program for core strengthening.  Patient does have the exercises at home and will restart these as well.  Discussed if she has a difficult time with them if they aggravate pain or if she is unable to find the exercises she should let us know and we can put a new referral in for physical therapy.  Discussed the importance of core strengthening, ROM, stretching exercises with the patient and how each of these entities is important in decreasing pain.  Explained to the patient that the purpose of physical therapy is to teach the patient a home exercise program.  These exercises need to be performed every day in order to decrease pain and prevent future occurrences of pain.        -PATIENT EDUCATION:  Total time of 32 minutes, on the day of service, spent with the patient, reviewing the chart, placing orders, and documenting.     -FOLLOW UP: Follow-up at the spine center for injection  Advised to contact clinic if symptoms worsen or change.    Subjective:     Andree Trujillo is a 74 year old female who presents today for follow-up regarding chronic bilateral low back pain that is most intense with standing and walking that typically resolves almost immediately with sitting or laying down.  She does report that her pain has been worsening over the last couple of months but she does report significant relief with the previous injection last year up until again more recent.  Currently denies lower extremity weakness, denies any recent trips or falls or balance changes.  Denies new symptoms today.    -Patient reports that she does have chronic knee pain and has had injections last in November 2023 with [facility].     -Treatment to Date: No prior spinal surgery  Physical therapy 2018 with [facility], nothing recent  Physical therapy 2024 LBP, right hip pain, bilateral knee pain     History of lumbar injections  in 2018 with [facility] with some relief.  Bilateral L3-4 TFESI 5/15/2024 with 90% relief for 9 months     -Medications:  Gabapentin 300 mg, 3 - 3 - 3    Patient Active Problem List   Diagnosis     Osteopenia of both hips     Hypercholesterolemia     Essential hypertension     Vitamin D deficiency     Cyst of ovary     Spinal stenosis of lumbar region with neurogenic claudication     DDD (degenerative disc disease), lumbar     Restless leg syndrome       Current Outpatient Medications   Medication Sig Dispense Refill     amLODIPine (NORVASC) 10 MG tablet TAKE 1 TABLET BY MOUTH ONCE DAILY IN THE MORNING . APPOINTMENT REQUIRED FOR FUTURE REFILLS 90 tablet 11     ASPIRIN 81 MG OR TABS 1 TABLET DAILY       atorvastatin (LIPITOR) 10 MG tablet Take 1 tablet (10 mg) by mouth daily. 90 tablet 11     celecoxib (CELEBREX) 100 MG capsule Take 1 capsule (100 mg) by mouth 2 times daily. 180 capsule 3     gabapentin (NEURONTIN) 300 MG capsule TAKE 3 CAPSULES BY MOUTH THREE TIMES DAILY 810 capsule 11     hydrochlorothiazide (MICROZIDE) 12.5 MG capsule Take 1 capsule by mouth once daily 90 capsule 11     metoprolol succinate ER (TOPROL XL) 100 MG 24 hr tablet Take 1 tablet (100 mg) by mouth daily. 90 tablet 11     Multiple Vitamin (DAILY MULTIVITAMIN PO) Take 1 tablet by mouth daily        pramipexole (MIRAPEX) 0.125 MG tablet Take 1-2 tablets (0.125-0.25 mg) by mouth at bedtime. 180 tablet 11     No current facility-administered medications for this visit.       Allergies   Allergen Reactions     Erythromycin Rash     Clindamycin Itching     Penicillins Rash       Past Medical History:   Diagnosis Date     Allergies      Anxiety      Cyst of breast      Essential hypertension      Osteoarthritis of spine with radiculopathy, lumbar region      Osteopenia      Right hip pain      Right knee pain      Spinal stenosis of lumbar region with neurogenic claudication      Tendinitis of both feet         Review of Systems  ROS:  Specifically  "negative for bowel/bladder dysfunction, balance changes, headache, dizziness, foot drop, fevers, chills, appetite changes, nausea/vomiting, unexplained weight loss. Otherwise 13 systems reviewed are negative. Please see the patient's intake questionnaire from today for details.    Reviewed Social, Family, Past Medical and Past Surgical history with patient, no significant changes noted since prior visit.     Objective:     /76   Pulse 78   Ht 5' 4.5\" (1.638 m)   Wt 193 lb (87.5 kg)   BMI 32.62 kg/m      PHYSICAL EXAMINATION:    --CONSTITUTIONAL: Well developed, well nourished, healthy appearing individual.  --PSYCHIATRIC: Appropriate mood and affect. No difficulty interacting due to temper, social withdrawal, or memory issues.  --SKIN: Lumbar region is dry and intact.   --RESPIRATORY: Normal rhythm and effort. No abnormal accessory muscle breathing patterns noted.   --MUSCULOSKELETAL:  Normal lumbar lordosis noted, no lateral shift.  --GROSS MOTOR: Easily arises from a seated position. Gait is non-antalgic  --LUMBAR SPINE:  Inspection reveals no evidence of deformity.     RESULTS:   Imaging: Spine imaging was reviewed today. The images were shown to the patient and the findings were explained using a spine model.        Lumbar Spine MRI reviewed                      Again, thank you for allowing me to participate in the care of your patient.        Sincerely,        Liset Goyal CNP    Electronically signed"

## 2025-06-18 NOTE — PATIENT INSTRUCTIONS
~Spine Center Scheduling #[phone].  ~Please call our [facility] Spine Nurse Navigation #[phone] with any questions or concerns about your treatment plan, if symptoms worsen and you would like to be seen urgently, or if you have problems controlling bladder and bowel function.  ~For any future flareups or new symptoms, recommend follow-up in clinic or contact the nurse navigator line.  ~Please note that any My Chart messages may take multiple days for a response due to the high volume of patients seen in clinic.  Anything sent Thursday night or after will be answered the following week when able, as Liset Goyal CNP does not work in clinic on Fridays.   ~Liset Goyal CNP is at the [facility] on Tuesdays, otherwise primarily at the [facility] [place] [facility].       Importance of specialized Physical Therapy: Discussed the importance of core strengthening, ROM, stretching exercises with the patient and how each of these entities is important in decreasing pain.  Explained to the patient that the purpose of physical therapy is to teach the patient a home exercise program individualized to them and their specific health concerns.  These exercises need to be performed every day in order to decrease pain and prevent future occurrences of pain.           An injection has been ordered today to potentially help with your pain symptoms. These injections do not fix what is going on in your back, therefore they typically do not take away the pain completely, however they can many times help improve symptoms. Injections should always be completed along with other modalities such as physical therapy for the best long term outcomes. If injections alone are done, then pain will likely return.     [facility] [place] Injection Requirements    A  is required for all fluoroscopically-guided injections.  Injection appointments may be cancelled if there are signs/symptoms of an  active infection or if the patient is being actively treated with antibiotics for a diagnosed infection.  Patients may have their steroid injection cancelled if they have had another steroid injection within 2 weeks.  Diabetic patients will have their blood glucose levels checked the day of their injection and the appointment will be rescheduled if the blood glucose level is 300 or higher.  Patients with allergies to cortisone, local anesthetics, iodine, or contrast dye should contact the Spine Center to further discuss these considerations.  Patients scheduled for medial branch block diagnostic injections should refrain from taking pain medication the day of the procedure.  The medial branch block injection appointment will be rescheduled if the patient's pain rating is not 5/10 or greater at the time of the procedure.  Patients taking warfarin/Coumadin will have their INR checked the day of the procedure and the procedure may be rescheduled if the INR is greater than 3.0.  Please contact the Spine Center (#[phone]) if you are taking any prescription blood-thinning medications (warfarin, Plavix, Lovenox, Eliquis, Brilinta, Effient, etc.) as special dosing adjustments may need to be made depending on the type of injection you are scheduled to receive.  It is recommended that you delay having your steroid injection if you have received a flu shot or shingles vaccine within 2 weeks.

## 2025-06-18 NOTE — PROGRESS NOTES
Assessment:     Diagnoses and all orders for this visit:  Spinal stenosis of lumbar region with neurogenic claudication  -     PAIN Transforaminal SAI Inj Lumbosacral [place]; Future  Lumbar foraminal stenosis  -     PAIN Transforaminal SAI Inj Lumbosacral [place]; Future  Lumbar facet arthropathy     Andree Trujillo is a 74 year old y.o. female with past medical history significant for  vitamin D deficiency, hypercholesterolemia, ovarian cyst, hypertension, osteopenia, restless leg syndrome, spinal stenosis who presents today for follow-up regarding:    - Chronic bilateral low back pain with standing and walking with consistency with neurogenic claudication. Last injection 2024.     Plan:     A shared decision making plan was used. The patient's values and choices were respected. Prior medical records were reviewed today. The following represents what was discussed and decided upon by the provider and the patient.        -DIAGNOSTIC TESTS: Images were personally reviewed and interpreted.   - Lumbar spine MRI 4/3/2024 with multilevel degenerative changes with facet arthropathy.  L3-4 moderate to severe spinal stenosis with right greater than left foraminal stenosis.  Mild spinal stenosis with mild bilateral foraminal stenosis.  L4-5 mild bilateral foraminal stenosis.  L5-S1 mild subarticular stenosis.     -INTERVENTIONS: Order placed for repeat bilateral L3-4 transforaminal epidural steroid injection which she did get significant relief with previously for over 9 months.  She does have central and bilateral foraminal stenosis at the L3-4 level    -MEDICATIONS: No changes in medications today.  Discussed side effects of medications and proper use. Patient verbalized understanding.    -PHYSICAL THERAPY: Did discuss and stressed the importance of continuing a daily home exercise program for core strengthening.  Patient does have the exercises at home and will restart these as well.  Discussed if she has a difficult time with  them if they aggravate pain or if she is unable to find the exercises she should let us know and we can put a new referral in for physical therapy.  Discussed the importance of core strengthening, ROM, stretching exercises with the patient and how each of these entities is important in decreasing pain.  Explained to the patient that the purpose of physical therapy is to teach the patient a home exercise program.  These exercises need to be performed every day in order to decrease pain and prevent future occurrences of pain.        -PATIENT EDUCATION:  Total time of 32 minutes, on the day of service, spent with the patient, reviewing the chart, placing orders, and documenting.     -FOLLOW UP: Follow-up at the spine center for injection  Advised to contact clinic if symptoms worsen or change.    Subjective:     Andree Trujillo is a 74 year old female who presents today for follow-up regarding chronic bilateral low back pain that is most intense with standing and walking that typically resolves almost immediately with sitting or laying down.  She does report that her pain has been worsening over the last couple of months but she does report significant relief with the previous injection last year up until again more recent.  Currently denies lower extremity weakness, denies any recent trips or falls or balance changes.  Denies new symptoms today.    -Patient reports that she does have chronic knee pain and has had injections last in November 2023 with [facility].     -Treatment to Date: No prior spinal surgery  Physical therapy 2018 with [facility], nothing recent  Physical therapy 2024 LBP, right hip pain, bilateral knee pain     History of lumbar injections in 2018 with [facility] with some relief.  Bilateral L3-4 TFESI 5/15/2024 with 90% relief for 9 months     -Medications:  Gabapentin 300 mg, 3 - 3 - 3    Patient Active Problem List   Diagnosis    Osteopenia of both hips    Hypercholesterolemia    Essential  hypertension    Vitamin D deficiency    Cyst of ovary    Spinal stenosis of lumbar region with neurogenic claudication    DDD (degenerative disc disease), lumbar    Restless leg syndrome       Current Outpatient Medications   Medication Sig Dispense Refill    amLODIPine (NORVASC) 10 MG tablet TAKE 1 TABLET BY MOUTH ONCE DAILY IN THE MORNING . APPOINTMENT REQUIRED FOR FUTURE REFILLS 90 tablet 11    ASPIRIN 81 MG OR TABS 1 TABLET DAILY      atorvastatin (LIPITOR) 10 MG tablet Take 1 tablet (10 mg) by mouth daily. 90 tablet 11    celecoxib (CELEBREX) 100 MG capsule Take 1 capsule (100 mg) by mouth 2 times daily. 180 capsule 3    gabapentin (NEURONTIN) 300 MG capsule TAKE 3 CAPSULES BY MOUTH THREE TIMES DAILY 810 capsule 11    hydrochlorothiazide (MICROZIDE) 12.5 MG capsule Take 1 capsule by mouth once daily 90 capsule 11    metoprolol succinate ER (TOPROL XL) 100 MG 24 hr tablet Take 1 tablet (100 mg) by mouth daily. 90 tablet 11    Multiple Vitamin (DAILY MULTIVITAMIN PO) Take 1 tablet by mouth daily       pramipexole (MIRAPEX) 0.125 MG tablet Take 1-2 tablets (0.125-0.25 mg) by mouth at bedtime. 180 tablet 11     No current facility-administered medications for this visit.       Allergies   Allergen Reactions    Erythromycin Rash    Clindamycin Itching    Penicillins Rash       Past Medical History:   Diagnosis Date    Allergies     Anxiety     Cyst of breast     Essential hypertension     Osteoarthritis of spine with radiculopathy, lumbar region     Osteopenia     Right hip pain     Right knee pain     Spinal stenosis of lumbar region with neurogenic claudication     Tendinitis of both feet         Review of Systems  ROS:  Specifically negative for bowel/bladder dysfunction, balance changes, headache, dizziness, foot drop, fevers, chills, appetite changes, nausea/vomiting, unexplained weight loss. Otherwise 13 systems reviewed are negative. Please see the patient's intake questionnaire from today for  "details.    Reviewed Social, Family, Past Medical and Past Surgical history with patient, no significant changes noted since prior visit.     Objective:     /76   Pulse 78   Ht 5' 4.5\" (1.638 m)   Wt 193 lb (87.5 kg)   BMI 32.62 kg/m      PHYSICAL EXAMINATION:    --CONSTITUTIONAL: Well developed, well nourished, healthy appearing individual.  --PSYCHIATRIC: Appropriate mood and affect. No difficulty interacting due to temper, social withdrawal, or memory issues.  --SKIN: Lumbar region is dry and intact.   --RESPIRATORY: Normal rhythm and effort. No abnormal accessory muscle breathing patterns noted.   --MUSCULOSKELETAL:  Normal lumbar lordosis noted, no lateral shift.  --GROSS MOTOR: Easily arises from a seated position. Gait is non-antalgic  --LUMBAR SPINE:  Inspection reveals no evidence of deformity.     RESULTS:   Imaging: Spine imaging was reviewed today. The images were shown to the patient and the findings were explained using a spine model.        Lumbar Spine MRI reviewed                    "

## 2025-06-18 NOTE — TELEPHONE ENCOUNTER
Problem: Safety  Goal: Will remain free from injury  Outcome: PROGRESSING AS EXPECTED  Call light within reach. Pt calls for assistance at all times.  Bed in lowest position, upper bedrails up, personal possessions within reach, treaded socks on.  Hourly rounding in place.        Problem: Pain Management  Goal: Pain level will decrease to patient's comfort goal  Outcome: PROGRESSING AS EXPECTED  Pain well managed on current pain regimen       Procedure ordered? YES    What insurance are we billing for this procedure?  [employer]  IF SCHEDULING AT [facility] PLEASE SCHEDULE AT LEAST 7-10 BUSINESS DAYS OUT SO A PA CAN BE OBTAINED    Is a  PAIN  order available to link to injection appointment or does one need to be transcribed?  YES: Bilateral L3-4 TF SAI    If needed, route to CN to assist in doing so.    Is  needed?: No   If YES, please initiate in-person  request.    Will patient have a ?  Yes    All fluoro procedures require a .  These US procedures also require a : piriformis, pudendal, scalene, & carpal tunnel.    Is patient taking any blood thinners (i.e. Plavix, coumadin, jantoven, warfarin, heparin, Xarelto, Pradaxa, Eliquis, Brilinta, or Effient, etc)? No   If YES, schedule out 2 weeks, and route to RN pool to determine if medication hold is needed.    Is patient taking aspirin? YES: 81 MG  For CERVICAL or INTERLAMINAR procedures, route message to Care Navigation so they can seek approval from managing provider to hold aspirin for 6 days prior to the procedure.    Does patient have an allergy to contrast dye or iodine?  No  If YES, OK to schedule. Route to RN pool AND add allergy information to appointment notes    Is patient diabetic? No If YES, blood glucose level will be checked on day of procedure and will need to be 300mg/dL or below (for steroid injections).    Does patient have an active infection or treated for one within the past week? No   If YES, do NOT schedule and route to Care Navigation.     Is patient currently taking any antibiotics or have an active infection?  No  For patients on chronic, preventative, or prophylactic antibiotics, procedures may be scheduled.   For patients on antibiotics for active or recent infection: antibiotic course must have been completed and symptom-free of infection to safely proceed with injection.  Send to Care Navigation if unsure.    Is patient  actively being treated for cancer or immunocompromised? No  If YES, do NOT schedule and route to RN pool.     Any chance of pregnancy? NO   If YES, do NOT schedule and route to RN pool.    Have you had any vaccines in the last 2 weeks? NO  If YES, please route to Care Navigation to discuss before scheduling.     Reminders:  -  If you are started on any steroids or antibiotics between now and your appointment, you must contact us because it may affect our ability to perform your procedure.   reviewed    -  Informed patient that it is OK to take normal medications before the procedure and must hold blood thinners as instructed.  reviewed    -  Patients scheduled for MBBs should not take pain meds prior to injection and pain rating needs to be 5/10 or greater the day of the procedure.    -  Informed cervical injection patients that an IV will be placed as a precautionary measure.  reviewed    -  Patients should eat a light meal prior to their procedure appointment.  reviewed    -  All radiofrequency ablations are in a 40 minute time slot and not to be scheduled until in-basket message has been sent by the procedure team that the PA has been  received.  reviewed     -  Spinal cord stimulator trial scheduling is coordinated by the procedure team.    -  Care Navigation (#[phone]) is available to assist patients with additional questions.  reviewed    -  Cervical TFESIs with Dr. Barrera only.    *PLEASE FORWARD TO CARE NAVIGATION IF INDICATED.  PATIENT SHOULD BE INFORMED THAT THEY WILL BE CONTACTED BY CARE NAVIGATION ONLY IF CHANGES TO MEDICATION/CARE PLAN RECOMMENDATIONS ARE NEEDED.  IF THEY DO NOT HEAR BACK FROM CARE NAVIGATION, THEY ARE FINE TO CONTINUE WITH THEIR CURRENT MEDICATIONS/CARE PLAN.*

## 2025-07-28 ENCOUNTER — HOSPITAL ENCOUNTER (OUTPATIENT)
Dept: CT IMAGING | Facility: CLINIC | Age: 75
Discharge: HOME | End: 2025-07-28
Attending: INTERNAL MEDICINE | Admitting: INTERNAL MEDICINE
Payer: COMMERCIAL

## 2025-07-28 DIAGNOSIS — Z82.49 FAMILY HISTORY OF ISCHEMIC HEART DISEASE: ICD-10-CM

## 2025-07-28 PROBLEM — R93.1 AGATSTON CORONARY ARTERY CALCIUM SCORE GREATER THAN 400: Status: ACTIVE | Noted: 2025-07-28

## 2025-07-28 LAB
CV CALCIUM SCORE AGATSTON LM: 0
CV CALCIUM SCORING AGATSON LAD: 76
CV CALCIUM SCORING AGATSTON CX: 73
CV CALCIUM SCORING AGATSTON RCA: 288
CV CALCIUM SCORING AGATSTON TOTAL: 437

## 2025-07-28 PROCEDURE — 75571 CT HRT W/O DYE W/CA TEST: CPT | Mod: 26 | Performed by: INTERNAL MEDICINE

## 2025-07-28 PROCEDURE — 75571 CT HRT W/O DYE W/CA TEST: CPT

## 2025-08-13 ENCOUNTER — RADIOLOGY INJECTION OFFICE VISIT (OUTPATIENT)
Dept: PHYSICAL MEDICINE AND REHAB | Facility: CLINIC | Age: 75
End: 2025-08-13
Attending: NURSE PRACTITIONER
Payer: COMMERCIAL

## 2025-08-13 VITALS
RESPIRATION RATE: 16 BRPM | HEART RATE: 66 BPM | SYSTOLIC BLOOD PRESSURE: 144 MMHG | DIASTOLIC BLOOD PRESSURE: 72 MMHG | TEMPERATURE: 97.7 F | OXYGEN SATURATION: 98 %

## 2025-08-13 DIAGNOSIS — M48.061 LUMBAR FORAMINAL STENOSIS: ICD-10-CM

## 2025-08-13 DIAGNOSIS — M48.062 SPINAL STENOSIS OF LUMBAR REGION WITH NEUROGENIC CLAUDICATION: ICD-10-CM

## 2025-08-13 PROCEDURE — 64483 NJX AA&/STRD TFRM EPI L/S 1: CPT | Mod: 50 | Performed by: PAIN MEDICINE

## 2025-08-13 RX ORDER — DEXAMETHASONE SODIUM PHOSPHATE 10 MG/ML
INJECTION, SOLUTION INTRAMUSCULAR; INTRAVENOUS
Status: COMPLETED | OUTPATIENT
Start: 2025-08-13 | End: 2025-08-13

## 2025-08-13 RX ORDER — LIDOCAINE HYDROCHLORIDE 10 MG/ML
INJECTION, SOLUTION EPIDURAL; INFILTRATION; INTRACAUDAL; PERINEURAL
Status: COMPLETED | OUTPATIENT
Start: 2025-08-13 | End: 2025-08-13

## 2025-08-13 RX ADMIN — LIDOCAINE HYDROCHLORIDE 4 ML: 10 INJECTION, SOLUTION EPIDURAL; INFILTRATION; INTRACAUDAL; PERINEURAL at 15:17

## 2025-08-13 RX ADMIN — DEXAMETHASONE SODIUM PHOSPHATE 10 MG: 10 INJECTION, SOLUTION INTRAMUSCULAR; INTRAVENOUS at 15:17

## 2025-08-13 ASSESSMENT — PAIN SCALES - GENERAL
PAINLEVEL_OUTOF10: SEVERE PAIN (8)
PAINLEVEL_OUTOF10: NO PAIN (0)